# Patient Record
Sex: MALE | Race: WHITE | NOT HISPANIC OR LATINO | Employment: OTHER | URBAN - METROPOLITAN AREA
[De-identification: names, ages, dates, MRNs, and addresses within clinical notes are randomized per-mention and may not be internally consistent; named-entity substitution may affect disease eponyms.]

---

## 2017-01-30 ENCOUNTER — HOSPITAL ENCOUNTER (OUTPATIENT)
Dept: LAB | Facility: MEDICAL CENTER | Age: 82
End: 2017-01-30
Attending: NURSE PRACTITIONER
Payer: MEDICARE

## 2017-01-30 LAB — GFR SERPL CREATININE-BSD FRML MDRD: 39 ML/MIN/1.73 M 2

## 2017-01-30 PROCEDURE — 82728 ASSAY OF FERRITIN: CPT

## 2017-01-30 PROCEDURE — 82306 VITAMIN D 25 HYDROXY: CPT

## 2017-01-30 PROCEDURE — 84443 ASSAY THYROID STIM HORMONE: CPT

## 2017-01-30 PROCEDURE — 83540 ASSAY OF IRON: CPT

## 2017-01-30 PROCEDURE — 80053 COMPREHEN METABOLIC PANEL: CPT

## 2017-01-30 PROCEDURE — 85025 COMPLETE CBC W/AUTO DIFF WBC: CPT

## 2017-01-30 PROCEDURE — 36415 COLL VENOUS BLD VENIPUNCTURE: CPT

## 2017-01-30 PROCEDURE — 83550 IRON BINDING TEST: CPT

## 2017-01-30 PROCEDURE — 84153 ASSAY OF PSA TOTAL: CPT | Mod: GA

## 2017-01-31 LAB
25(OH)D3 SERPL-MCNC: 109 NG/ML (ref 30–100)
ALBUMIN SERPL BCP-MCNC: 3.6 G/DL (ref 3.2–4.9)
ALBUMIN/GLOB SERPL: 0.9 G/DL
ALP SERPL-CCNC: 84 U/L (ref 30–99)
ALT SERPL-CCNC: 12 U/L (ref 2–50)
ANION GAP SERPL CALC-SCNC: 6 MMOL/L (ref 0–11.9)
AST SERPL-CCNC: 14 U/L (ref 12–45)
BASOPHILS # BLD AUTO: 1.2 % (ref 0–1.8)
BASOPHILS # BLD: 0.1 K/UL (ref 0–0.12)
BILIRUB SERPL-MCNC: 1 MG/DL (ref 0.1–1.5)
BUN SERPL-MCNC: 35 MG/DL (ref 8–22)
CALCIUM SERPL-MCNC: 8.9 MG/DL (ref 8.4–10.2)
CHLORIDE SERPL-SCNC: 108 MMOL/L (ref 96–112)
CO2 SERPL-SCNC: 26 MMOL/L (ref 20–33)
CREAT SERPL-MCNC: 1.65 MG/DL (ref 0.5–1.4)
EOSINOPHIL # BLD AUTO: 0.31 K/UL (ref 0–0.51)
EOSINOPHIL NFR BLD: 3.9 % (ref 0–6.9)
ERYTHROCYTE [DISTWIDTH] IN BLOOD BY AUTOMATED COUNT: 45.5 FL (ref 35.9–50)
FERRITIN SERPL-MCNC: 53.5 NG/ML (ref 22–322)
GLOBULIN SER CALC-MCNC: 3.9 G/DL (ref 1.9–3.5)
GLUCOSE SERPL-MCNC: 101 MG/DL (ref 65–99)
HCT VFR BLD AUTO: 39.5 % (ref 42–52)
HGB BLD-MCNC: 13.7 G/DL (ref 14–18)
IMM GRANULOCYTES # BLD AUTO: 0.03 K/UL (ref 0–0.11)
IMM GRANULOCYTES NFR BLD AUTO: 0.4 % (ref 0–0.9)
IRON SATN MFR SERPL: 22 % (ref 15–55)
IRON SERPL-MCNC: 76 UG/DL (ref 50–180)
LYMPHOCYTES # BLD AUTO: 1.21 K/UL (ref 1–4.8)
LYMPHOCYTES NFR BLD: 15.1 % (ref 22–41)
MCH RBC QN AUTO: 32.6 PG (ref 27–33)
MCHC RBC AUTO-ENTMCNC: 34.7 G/DL (ref 33.7–35.3)
MCV RBC AUTO: 94 FL (ref 81.4–97.8)
MONOCYTES # BLD AUTO: 0.5 K/UL (ref 0–0.85)
MONOCYTES NFR BLD AUTO: 6.2 % (ref 0–13.4)
NEUTROPHILS # BLD AUTO: 5.86 K/UL (ref 1.82–7.42)
NEUTROPHILS NFR BLD: 73.2 % (ref 44–72)
NRBC # BLD AUTO: 0 K/UL
NRBC BLD AUTO-RTO: 0 /100 WBC
PLATELET # BLD AUTO: 164 K/UL (ref 164–446)
PMV BLD AUTO: 10.5 FL (ref 9–12.9)
POTASSIUM SERPL-SCNC: 4.2 MMOL/L (ref 3.6–5.5)
PROT SERPL-MCNC: 7.5 G/DL (ref 6–8.2)
PSA SERPL DL<=0.01 NG/ML-MCNC: 4.27 NG/ML (ref 0–4)
RBC # BLD AUTO: 4.2 M/UL (ref 4.7–6.1)
SODIUM SERPL-SCNC: 140 MMOL/L (ref 135–145)
TIBC SERPL-MCNC: 351 UG/DL (ref 250–450)
WBC # BLD AUTO: 8 K/UL (ref 4.8–10.8)

## 2017-04-30 ENCOUNTER — HOSPITAL ENCOUNTER (OUTPATIENT)
Facility: MEDICAL CENTER | Age: 82
End: 2017-04-30
Attending: FAMILY MEDICINE
Payer: MEDICARE

## 2017-04-30 LAB
ANION GAP SERPL CALC-SCNC: 10 MMOL/L (ref 0–11.9)
BASOPHILS # BLD AUTO: 0.7 % (ref 0–1.8)
BASOPHILS # BLD: 0.05 K/UL (ref 0–0.12)
BUN SERPL-MCNC: 29 MG/DL (ref 8–22)
CALCIUM SERPL-MCNC: 8.9 MG/DL (ref 8.5–10.5)
CHLORIDE SERPL-SCNC: 106 MMOL/L (ref 96–112)
CO2 SERPL-SCNC: 22 MMOL/L (ref 20–33)
CREAT SERPL-MCNC: 1.67 MG/DL (ref 0.5–1.4)
EOSINOPHIL # BLD AUTO: 0.28 K/UL (ref 0–0.51)
EOSINOPHIL NFR BLD: 3.8 % (ref 0–6.9)
ERYTHROCYTE [DISTWIDTH] IN BLOOD BY AUTOMATED COUNT: 46.1 FL (ref 35.9–50)
GFR SERPL CREATININE-BSD FRML MDRD: 39 ML/MIN/1.73 M 2
GLUCOSE SERPL-MCNC: 125 MG/DL (ref 65–99)
HCT VFR BLD AUTO: 42.5 % (ref 42–52)
HGB BLD-MCNC: 14.5 G/DL (ref 14–18)
IMM GRANULOCYTES # BLD AUTO: 0.02 K/UL (ref 0–0.11)
IMM GRANULOCYTES NFR BLD AUTO: 0.3 % (ref 0–0.9)
LYMPHOCYTES # BLD AUTO: 1.35 K/UL (ref 1–4.8)
LYMPHOCYTES NFR BLD: 18.3 % (ref 22–41)
MCH RBC QN AUTO: 32.4 PG (ref 27–33)
MCHC RBC AUTO-ENTMCNC: 34.1 G/DL (ref 33.7–35.3)
MCV RBC AUTO: 95.1 FL (ref 81.4–97.8)
MONOCYTES # BLD AUTO: 0.44 K/UL (ref 0–0.85)
MONOCYTES NFR BLD AUTO: 6 % (ref 0–13.4)
NEUTROPHILS # BLD AUTO: 5.23 K/UL (ref 1.82–7.42)
NEUTROPHILS NFR BLD: 70.9 % (ref 44–72)
NRBC # BLD AUTO: 0 K/UL
NRBC BLD AUTO-RTO: 0 /100 WBC
PLATELET # BLD AUTO: 147 K/UL (ref 164–446)
PMV BLD AUTO: 10.6 FL (ref 9–12.9)
POTASSIUM SERPL-SCNC: 4.1 MMOL/L (ref 3.6–5.5)
RBC # BLD AUTO: 4.47 M/UL (ref 4.7–6.1)
SODIUM SERPL-SCNC: 138 MMOL/L (ref 135–145)
WBC # BLD AUTO: 7.4 K/UL (ref 4.8–10.8)

## 2017-04-30 PROCEDURE — 85025 COMPLETE CBC W/AUTO DIFF WBC: CPT

## 2017-04-30 PROCEDURE — 80048 BASIC METABOLIC PNL TOTAL CA: CPT

## 2017-04-30 PROCEDURE — 36415 COLL VENOUS BLD VENIPUNCTURE: CPT

## 2017-09-04 ENCOUNTER — APPOINTMENT (OUTPATIENT)
Dept: RADIOLOGY | Facility: MEDICAL CENTER | Age: 82
DRG: 291 | End: 2017-09-04
Attending: EMERGENCY MEDICINE
Payer: MEDICARE

## 2017-09-04 ENCOUNTER — RESOLUTE PROFESSIONAL BILLING HOSPITAL PROF FEE (OUTPATIENT)
Dept: HOSPITALIST | Facility: MEDICAL CENTER | Age: 82
End: 2017-09-04
Payer: MEDICARE

## 2017-09-04 ENCOUNTER — HOSPITAL ENCOUNTER (INPATIENT)
Facility: MEDICAL CENTER | Age: 82
LOS: 6 days | DRG: 291 | End: 2017-09-10
Attending: EMERGENCY MEDICINE | Admitting: HOSPITALIST
Payer: MEDICARE

## 2017-09-04 DIAGNOSIS — I50.21 ACUTE SYSTOLIC CONGESTIVE HEART FAILURE (HCC): ICD-10-CM

## 2017-09-04 DIAGNOSIS — R06.03 RESPIRATORY DISTRESS: ICD-10-CM

## 2017-09-04 DIAGNOSIS — R53.1 WEAKNESS: ICD-10-CM

## 2017-09-04 DIAGNOSIS — A41.9 SEPSIS, DUE TO UNSPECIFIED ORGANISM: ICD-10-CM

## 2017-09-04 PROBLEM — I50.31 ACUTE DIASTOLIC CHF (CONGESTIVE HEART FAILURE) (HCC): Status: ACTIVE | Noted: 2017-09-04

## 2017-09-04 LAB
ALBUMIN SERPL BCP-MCNC: 3.6 G/DL (ref 3.2–4.9)
ALBUMIN/GLOB SERPL: 0.9 G/DL
ALP SERPL-CCNC: 113 U/L (ref 30–99)
ALT SERPL-CCNC: 14 U/L (ref 2–50)
ANION GAP SERPL CALC-SCNC: 9 MMOL/L (ref 0–11.9)
APPEARANCE UR: CLEAR
AST SERPL-CCNC: 21 U/L (ref 12–45)
BACTERIA #/AREA URNS HPF: ABNORMAL /HPF
BASE EXCESS BLDA CALC-SCNC: -3 MMOL/L (ref -4–3)
BASOPHILS # BLD AUTO: 0.6 % (ref 0–1.8)
BASOPHILS # BLD: 0.06 K/UL (ref 0–0.12)
BILIRUB SERPL-MCNC: 1.3 MG/DL (ref 0.1–1.5)
BILIRUB UR QL STRIP.AUTO: NEGATIVE
BNP SERPL-MCNC: 297 PG/ML (ref 0–100)
BODY TEMPERATURE: ABNORMAL CENTIGRADE
BUN SERPL-MCNC: 34 MG/DL (ref 8–22)
CALCIUM SERPL-MCNC: 8.4 MG/DL (ref 8.4–10.2)
CHLORIDE SERPL-SCNC: 105 MMOL/L (ref 96–112)
CO2 SERPL-SCNC: 21 MMOL/L (ref 20–33)
COLOR UR: YELLOW
CREAT SERPL-MCNC: 1.86 MG/DL (ref 0.5–1.4)
EOSINOPHIL # BLD AUTO: 0.26 K/UL (ref 0–0.51)
EOSINOPHIL NFR BLD: 2.4 % (ref 0–6.9)
ERYTHROCYTE [DISTWIDTH] IN BLOOD BY AUTOMATED COUNT: 46.6 FL (ref 35.9–50)
GFR SERPL CREATININE-BSD FRML MDRD: 34 ML/MIN/1.73 M 2
GLOBULIN SER CALC-MCNC: 4 G/DL (ref 1.9–3.5)
GLUCOSE SERPL-MCNC: 119 MG/DL (ref 65–99)
GLUCOSE UR STRIP.AUTO-MCNC: NEGATIVE MG/DL
HCO3 BLDA-SCNC: 21 MMOL/L (ref 17–25)
HCT VFR BLD AUTO: 38.2 % (ref 42–52)
HGB BLD-MCNC: 12.9 G/DL (ref 14–18)
IMM GRANULOCYTES # BLD AUTO: 0.06 K/UL (ref 0–0.11)
IMM GRANULOCYTES NFR BLD AUTO: 0.6 % (ref 0–0.9)
INR PPP: 1.4 (ref 0.87–1.13)
KETONES UR STRIP.AUTO-MCNC: NEGATIVE MG/DL
LACTATE BLD-SCNC: 1.81 MMOL/L (ref 0.5–2)
LACTATE BLD-SCNC: 1.89 MMOL/L (ref 0.5–2)
LACTATE BLD-SCNC: 3.37 MMOL/L (ref 0.5–2)
LEUKOCYTE ESTERASE UR QL STRIP.AUTO: NEGATIVE
LYMPHOCYTES # BLD AUTO: 1.06 K/UL (ref 1–4.8)
LYMPHOCYTES NFR BLD: 9.9 % (ref 22–41)
MCH RBC QN AUTO: 32.5 PG (ref 27–33)
MCHC RBC AUTO-ENTMCNC: 33.8 G/DL (ref 33.7–35.3)
MCV RBC AUTO: 96.2 FL (ref 81.4–97.8)
MICRO URNS: ABNORMAL
MONOCYTES # BLD AUTO: 1.09 K/UL (ref 0–0.85)
MONOCYTES NFR BLD AUTO: 10.2 % (ref 0–13.4)
MUCOUS THREADS #/AREA URNS HPF: ABNORMAL /HPF
NEUTROPHILS # BLD AUTO: 8.14 K/UL (ref 1.82–7.42)
NEUTROPHILS NFR BLD: 76.3 % (ref 44–72)
NITRITE UR QL STRIP.AUTO: NEGATIVE
NRBC # BLD AUTO: 0 K/UL
NRBC BLD AUTO-RTO: 0 /100 WBC
PCO2 BLDA: 34.2 MMHG (ref 26–37)
PH BLDA: 7.41 [PH] (ref 7.4–7.5)
PH UR STRIP.AUTO: 5.5 [PH]
PLATELET # BLD AUTO: 152 K/UL (ref 164–446)
PMV BLD AUTO: 10.1 FL (ref 9–12.9)
PO2 BLDA: 69.7 MMHG (ref 64–87)
POTASSIUM SERPL-SCNC: 4 MMOL/L (ref 3.6–5.5)
PROT SERPL-MCNC: 7.6 G/DL (ref 6–8.2)
PROT UR QL STRIP: 30 MG/DL
PROTHROMBIN TIME: 16.9 SEC (ref 12–14.6)
RBC # BLD AUTO: 3.97 M/UL (ref 4.7–6.1)
RBC # URNS HPF: ABNORMAL /HPF
RBC UR QL AUTO: NEGATIVE
SAO2 % BLDA: 92.3 % (ref 93–99)
SODIUM SERPL-SCNC: 135 MMOL/L (ref 135–145)
SP GR UR STRIP.AUTO: 1.02
SPECIMEN DRAWN FROM PATIENT: ABNORMAL
SPECIMEN DRAWN FROM PATIENT: NORMAL
SPECIMEN DRAWN FROM PATIENT: NORMAL
WBC # BLD AUTO: 10.7 K/UL (ref 4.8–10.8)
WBC #/AREA URNS HPF: ABNORMAL /HPF

## 2017-09-04 PROCEDURE — 82803 BLOOD GASES ANY COMBINATION: CPT

## 2017-09-04 PROCEDURE — 700101 HCHG RX REV CODE 250: Performed by: EMERGENCY MEDICINE

## 2017-09-04 PROCEDURE — 87040 BLOOD CULTURE FOR BACTERIA: CPT | Mod: 91

## 2017-09-04 PROCEDURE — A9270 NON-COVERED ITEM OR SERVICE: HCPCS | Performed by: HOSPITALIST

## 2017-09-04 PROCEDURE — 83880 ASSAY OF NATRIURETIC PEPTIDE: CPT

## 2017-09-04 PROCEDURE — 94640 AIRWAY INHALATION TREATMENT: CPT

## 2017-09-04 PROCEDURE — 85025 COMPLETE CBC W/AUTO DIFF WBC: CPT

## 2017-09-04 PROCEDURE — 700105 HCHG RX REV CODE 258: Performed by: EMERGENCY MEDICINE

## 2017-09-04 PROCEDURE — 80053 COMPREHEN METABOLIC PANEL: CPT

## 2017-09-04 PROCEDURE — 87086 URINE CULTURE/COLONY COUNT: CPT

## 2017-09-04 PROCEDURE — 96375 TX/PRO/DX INJ NEW DRUG ADDON: CPT

## 2017-09-04 PROCEDURE — 83605 ASSAY OF LACTIC ACID: CPT | Mod: 91

## 2017-09-04 PROCEDURE — 36415 COLL VENOUS BLD VENIPUNCTURE: CPT

## 2017-09-04 PROCEDURE — 99285 EMERGENCY DEPT VISIT HI MDM: CPT

## 2017-09-04 PROCEDURE — 96365 THER/PROPH/DIAG IV INF INIT: CPT

## 2017-09-04 PROCEDURE — 700111 HCHG RX REV CODE 636 W/ 250 OVERRIDE (IP): Performed by: EMERGENCY MEDICINE

## 2017-09-04 PROCEDURE — 700102 HCHG RX REV CODE 250 W/ 637 OVERRIDE(OP): Performed by: HOSPITALIST

## 2017-09-04 PROCEDURE — 99223 1ST HOSP IP/OBS HIGH 75: CPT | Mod: AI | Performed by: HOSPITALIST

## 2017-09-04 PROCEDURE — 36600 WITHDRAWAL OF ARTERIAL BLOOD: CPT

## 2017-09-04 PROCEDURE — 85610 PROTHROMBIN TIME: CPT

## 2017-09-04 PROCEDURE — 71010 DX-CHEST-PORTABLE (1 VIEW): CPT

## 2017-09-04 PROCEDURE — 770020 HCHG ROOM/CARE - TELE (206)

## 2017-09-04 PROCEDURE — 81001 URINALYSIS AUTO W/SCOPE: CPT

## 2017-09-04 RX ORDER — DABIGATRAN ETEXILATE 75 MG/1
75 CAPSULE ORAL 2 TIMES DAILY
COMMUNITY
End: 2018-02-09

## 2017-09-04 RX ORDER — MEMANTINE HYDROCHLORIDE 10 MG/1
5 TABLET ORAL DAILY
Status: DISCONTINUED | OUTPATIENT
Start: 2017-09-05 | End: 2017-09-10 | Stop reason: HOSPADM

## 2017-09-04 RX ORDER — AZITHROMYCIN 250 MG/1
500 TABLET, FILM COATED ORAL ONCE
Status: COMPLETED | OUTPATIENT
Start: 2017-09-04 | End: 2017-09-04

## 2017-09-04 RX ORDER — BISACODYL 10 MG
10 SUPPOSITORY, RECTAL RECTAL
Status: DISCONTINUED | OUTPATIENT
Start: 2017-09-04 | End: 2017-09-10 | Stop reason: HOSPADM

## 2017-09-04 RX ORDER — ESCITALOPRAM OXALATE 10 MG/1
10 TABLET ORAL DAILY
Status: DISCONTINUED | OUTPATIENT
Start: 2017-09-05 | End: 2017-09-10 | Stop reason: HOSPADM

## 2017-09-04 RX ORDER — SODIUM CHLORIDE 9 MG/ML
1000 INJECTION, SOLUTION INTRAVENOUS CONTINUOUS
Status: DISCONTINUED | OUTPATIENT
Start: 2017-09-04 | End: 2017-09-04

## 2017-09-04 RX ORDER — AMOXICILLIN 250 MG
2 CAPSULE ORAL 2 TIMES DAILY
Status: DISCONTINUED | OUTPATIENT
Start: 2017-09-04 | End: 2017-09-10 | Stop reason: HOSPADM

## 2017-09-04 RX ORDER — MEMANTINE HYDROCHLORIDE 5 MG/1
5 TABLET ORAL EVERY EVENING
COMMUNITY
End: 2018-02-27 | Stop reason: SDUPTHER

## 2017-09-04 RX ORDER — DABIGATRAN ETEXILATE 75 MG/1
75 CAPSULE ORAL 2 TIMES DAILY
Status: DISCONTINUED | OUTPATIENT
Start: 2017-09-04 | End: 2017-09-10 | Stop reason: HOSPADM

## 2017-09-04 RX ORDER — POLYETHYLENE GLYCOL 3350 17 G/17G
1 POWDER, FOR SOLUTION ORAL
Status: DISCONTINUED | OUTPATIENT
Start: 2017-09-04 | End: 2017-09-10 | Stop reason: HOSPADM

## 2017-09-04 RX ORDER — ONDANSETRON 2 MG/ML
4 INJECTION INTRAMUSCULAR; INTRAVENOUS EVERY 4 HOURS PRN
Status: DISCONTINUED | OUTPATIENT
Start: 2017-09-04 | End: 2017-09-10 | Stop reason: HOSPADM

## 2017-09-04 RX ORDER — ONDANSETRON 4 MG/1
4 TABLET, ORALLY DISINTEGRATING ORAL EVERY 4 HOURS PRN
Status: DISCONTINUED | OUTPATIENT
Start: 2017-09-04 | End: 2017-09-10 | Stop reason: HOSPADM

## 2017-09-04 RX ORDER — FUROSEMIDE 10 MG/ML
20 INJECTION INTRAMUSCULAR; INTRAVENOUS
Status: DISCONTINUED | OUTPATIENT
Start: 2017-09-04 | End: 2017-09-04

## 2017-09-04 RX ORDER — FUROSEMIDE 10 MG/ML
20 INJECTION INTRAMUSCULAR; INTRAVENOUS
Status: DISCONTINUED | OUTPATIENT
Start: 2017-09-05 | End: 2017-09-05

## 2017-09-04 RX ORDER — CEFTRIAXONE 2 G/1
2 INJECTION, POWDER, FOR SOLUTION INTRAMUSCULAR; INTRAVENOUS DAILY
Status: DISCONTINUED | OUTPATIENT
Start: 2017-09-05 | End: 2017-09-05

## 2017-09-04 RX ORDER — FUROSEMIDE 10 MG/ML
40 INJECTION INTRAMUSCULAR; INTRAVENOUS ONCE
Status: COMPLETED | OUTPATIENT
Start: 2017-09-04 | End: 2017-09-04

## 2017-09-04 RX ORDER — LOSARTAN POTASSIUM 25 MG/1
25 TABLET ORAL DAILY
Status: ON HOLD | COMMUNITY
End: 2017-09-10

## 2017-09-04 RX ORDER — AZITHROMYCIN 250 MG/1
250 TABLET, FILM COATED ORAL DAILY
Status: DISCONTINUED | OUTPATIENT
Start: 2017-09-05 | End: 2017-09-05

## 2017-09-04 RX ORDER — TAMSULOSIN HYDROCHLORIDE 0.4 MG/1
0.4 CAPSULE ORAL
Status: DISCONTINUED | OUTPATIENT
Start: 2017-09-04 | End: 2017-09-10 | Stop reason: HOSPADM

## 2017-09-04 RX ORDER — CEFTRIAXONE 1 G/1
1 INJECTION, POWDER, FOR SOLUTION INTRAMUSCULAR; INTRAVENOUS ONCE
Status: COMPLETED | OUTPATIENT
Start: 2017-09-04 | End: 2017-09-04

## 2017-09-04 RX ORDER — IPRATROPIUM BROMIDE AND ALBUTEROL SULFATE 2.5; .5 MG/3ML; MG/3ML
3 SOLUTION RESPIRATORY (INHALATION)
Status: DISCONTINUED | OUTPATIENT
Start: 2017-09-04 | End: 2017-09-05

## 2017-09-04 RX ORDER — OMEPRAZOLE 20 MG/1
20 CAPSULE, DELAYED RELEASE ORAL DAILY
Status: DISCONTINUED | OUTPATIENT
Start: 2017-09-05 | End: 2017-09-10 | Stop reason: HOSPADM

## 2017-09-04 RX ADMIN — TAMSULOSIN HYDROCHLORIDE 0.4 MG: 0.4 CAPSULE ORAL at 18:42

## 2017-09-04 RX ADMIN — CEFTRIAXONE 1 G: 1 INJECTION, POWDER, FOR SOLUTION INTRAMUSCULAR; INTRAVENOUS at 15:05

## 2017-09-04 RX ADMIN — DABIGATRAN ETEXILATE MESYLATE 75 MG: 75 CAPSULE ORAL at 20:15

## 2017-09-04 RX ADMIN — SODIUM CHLORIDE 1000 ML: 9 INJECTION, SOLUTION INTRAVENOUS at 15:05

## 2017-09-04 RX ADMIN — ALBUTEROL SULFATE 2.5 MG: 2.5 SOLUTION RESPIRATORY (INHALATION) at 14:49

## 2017-09-04 RX ADMIN — FUROSEMIDE 40 MG: 10 INJECTION, SOLUTION INTRAMUSCULAR; INTRAVENOUS at 16:31

## 2017-09-04 RX ADMIN — IPRATROPIUM BROMIDE 0.5 MG: 0.5 SOLUTION RESPIRATORY (INHALATION) at 14:49

## 2017-09-04 RX ADMIN — AZITHROMYCIN 500 MG: 250 TABLET, FILM COATED ORAL at 20:15

## 2017-09-04 ASSESSMENT — PAIN SCALES - GENERAL
PAINLEVEL_OUTOF10: 0

## 2017-09-04 ASSESSMENT — CHA2DS2 SCORE
AGE 65 TO 74: NO
DIABETES: NO
VASCULAR DISEASE: NO
HYPERTENSION: YES
CHA2DS2 VASC SCORE: 4
AGE 75 OR GREATER: YES
CHF OR LEFT VENTRICULAR DYSFUNCTION: YES
PRIOR STROKE OR TIA OR THROMBOEMBOLISM: NO
SEX: MALE

## 2017-09-04 ASSESSMENT — PATIENT HEALTH QUESTIONNAIRE - PHQ9
2. FEELING DOWN, DEPRESSED, IRRITABLE, OR HOPELESS: NOT AT ALL
SUM OF ALL RESPONSES TO PHQ9 QUESTIONS 1 AND 2: 0
1. LITTLE INTEREST OR PLEASURE IN DOING THINGS: NOT AT ALL
SUM OF ALL RESPONSES TO PHQ QUESTIONS 1-9: 0

## 2017-09-04 ASSESSMENT — LIFESTYLE VARIABLES
EVER_SMOKED: NEVER
ALCOHOL_USE: NO
EVER_SMOKED: YES

## 2017-09-04 NOTE — FLOWSHEET NOTE
SVN given in ER.  Patient states he does not feel much difference.  RR and breath sounds with no change post treatment.     09/04/17 0461   Interdisciplinary Plan of Care-Goals (Indications)   Obstructive Ventilatory Defect or Pulmonary Disease without Obvious Obstruction Physical Exam / Hyperinflation / Wheezing (bronchospasm)   SVN Group   #SVN Performed Yes   Given By: Mask   Chest Exam   Work Of Breathing / Effort Tachypnea;Increased Work of Breathing   Respiration (!) 36   Pulse 61   Heart Rate (Monitored) 60   Breath Sounds   RUL Breath Sounds Coarse Crackles;Expiratory Wheezes   RML Breath Sounds Coarse Crackles;Expiratory Wheezes   RLL Breath Sounds Coarse Crackles;Expiratory Wheezes   SARA Breath Sounds Coarse Crackles;Expiratory Wheezes   LLL Breath Sounds Coarse Crackles;Expiratory Wheezes   Secretions   Cough Moist;Non Productive   How Sputum Obtained Cough on Request   Oximetry   Continuous Oximetry Yes   Oxygen   Home O2 Use Prior To Admission? No   Pulse Oximetry 92 %   O2 (LPM) 2   O2 Daily Delivery Respiratory  Silicone Nasal Cannula

## 2017-09-04 NOTE — ED NOTES
POC discussed with pt and pt's son using AIDET. Pt and pt's son agree with POC. Pt with call light in reach and gurney in low position for pt safety. Respiratory tech at bedside for breathing tx.

## 2017-09-04 NOTE — ED NOTES
Pt with dressing to left elbow. Dressing placed by Dr Colin. Pt had GLF today while transferring from vehicle to w/c.

## 2017-09-04 NOTE — ED NOTES
"Chief Complaint   Patient presents with   • Shortness of Breath     for 3 days, increasing SOB and difficulty breathing     /80   Pulse 71   Temp 37.2 °C (98.9 °F)   Resp (!) 40   Ht 1.702 m (5' 7\")   Wt 68 kg (149 lb 14.6 oz)   SpO2 88%   BMI 23.48 kg/m²     "

## 2017-09-05 ENCOUNTER — APPOINTMENT (OUTPATIENT)
Dept: RADIOLOGY | Facility: MEDICAL CENTER | Age: 82
DRG: 291 | End: 2017-09-05
Attending: HOSPITALIST
Payer: MEDICARE

## 2017-09-05 ENCOUNTER — APPOINTMENT (OUTPATIENT)
Dept: RADIOLOGY | Facility: MEDICAL CENTER | Age: 82
DRG: 291 | End: 2017-09-05
Attending: INTERNAL MEDICINE
Payer: MEDICARE

## 2017-09-05 PROBLEM — J96.01 ACUTE RESPIRATORY FAILURE WITH HYPOXIA (HCC): Status: ACTIVE | Noted: 2017-09-05

## 2017-09-05 PROBLEM — N18.30 CHRONIC KIDNEY DISEASE, STAGE III (MODERATE): Status: ACTIVE | Noted: 2017-09-05

## 2017-09-05 PROBLEM — E87.20 LACTIC ACIDOSIS: Status: ACTIVE | Noted: 2017-09-05

## 2017-09-05 LAB
ANION GAP SERPL CALC-SCNC: 9 MMOL/L (ref 0–11.9)
BASOPHILS # BLD AUTO: 0.4 % (ref 0–1.8)
BASOPHILS # BLD: 0.04 K/UL (ref 0–0.12)
BUN SERPL-MCNC: 35 MG/DL (ref 8–22)
CALCIUM SERPL-MCNC: 8.2 MG/DL (ref 8.4–10.2)
CHLORIDE SERPL-SCNC: 103 MMOL/L (ref 96–112)
CO2 SERPL-SCNC: 23 MMOL/L (ref 20–33)
CREAT SERPL-MCNC: 1.77 MG/DL (ref 0.5–1.4)
EOSINOPHIL # BLD AUTO: 0.18 K/UL (ref 0–0.51)
EOSINOPHIL NFR BLD: 1.8 % (ref 0–6.9)
ERYTHROCYTE [DISTWIDTH] IN BLOOD BY AUTOMATED COUNT: 45.2 FL (ref 35.9–50)
GFR SERPL CREATININE-BSD FRML MDRD: 36 ML/MIN/1.73 M 2
GLUCOSE SERPL-MCNC: 123 MG/DL (ref 65–99)
HCT VFR BLD AUTO: 35.5 % (ref 42–52)
HGB BLD-MCNC: 12.2 G/DL (ref 14–18)
IMM GRANULOCYTES # BLD AUTO: 0.02 K/UL (ref 0–0.11)
IMM GRANULOCYTES NFR BLD AUTO: 0.2 % (ref 0–0.9)
LV EJECT FRACT  99904: 70
LYMPHOCYTES # BLD AUTO: 0.73 K/UL (ref 1–4.8)
LYMPHOCYTES NFR BLD: 7.4 % (ref 22–41)
MCH RBC QN AUTO: 32.2 PG (ref 27–33)
MCHC RBC AUTO-ENTMCNC: 34.4 G/DL (ref 33.7–35.3)
MCV RBC AUTO: 93.7 FL (ref 81.4–97.8)
MONOCYTES # BLD AUTO: 1.06 K/UL (ref 0–0.85)
MONOCYTES NFR BLD AUTO: 10.8 % (ref 0–13.4)
NEUTROPHILS # BLD AUTO: 7.77 K/UL (ref 1.82–7.42)
NEUTROPHILS NFR BLD: 79.4 % (ref 44–72)
NRBC # BLD AUTO: 0 K/UL
NRBC BLD AUTO-RTO: 0 /100 WBC
PLATELET # BLD AUTO: 138 K/UL (ref 164–446)
PMV BLD AUTO: 10.7 FL (ref 9–12.9)
POTASSIUM SERPL-SCNC: 3.9 MMOL/L (ref 3.6–5.5)
RBC # BLD AUTO: 3.79 M/UL (ref 4.7–6.1)
SODIUM SERPL-SCNC: 135 MMOL/L (ref 135–145)
WBC # BLD AUTO: 9.8 K/UL (ref 4.8–10.8)

## 2017-09-05 PROCEDURE — A9270 NON-COVERED ITEM OR SERVICE: HCPCS | Performed by: HOSPITALIST

## 2017-09-05 PROCEDURE — 700102 HCHG RX REV CODE 250 W/ 637 OVERRIDE(OP): Performed by: HOSPITALIST

## 2017-09-05 PROCEDURE — 80048 BASIC METABOLIC PNL TOTAL CA: CPT

## 2017-09-05 PROCEDURE — 93306 TTE W/DOPPLER COMPLETE: CPT

## 2017-09-05 PROCEDURE — 700111 HCHG RX REV CODE 636 W/ 250 OVERRIDE (IP): Performed by: HOSPITALIST

## 2017-09-05 PROCEDURE — 71010 DX-CHEST-PORTABLE (1 VIEW): CPT

## 2017-09-05 PROCEDURE — 93306 TTE W/DOPPLER COMPLETE: CPT | Mod: 26 | Performed by: INTERNAL MEDICINE

## 2017-09-05 PROCEDURE — 770020 HCHG ROOM/CARE - TELE (206)

## 2017-09-05 PROCEDURE — 700111 HCHG RX REV CODE 636 W/ 250 OVERRIDE (IP): Performed by: INTERNAL MEDICINE

## 2017-09-05 PROCEDURE — 85025 COMPLETE CBC W/AUTO DIFF WBC: CPT

## 2017-09-05 PROCEDURE — 700105 HCHG RX REV CODE 258: Performed by: HOSPITALIST

## 2017-09-05 PROCEDURE — 99233 SBSQ HOSP IP/OBS HIGH 50: CPT | Performed by: INTERNAL MEDICINE

## 2017-09-05 PROCEDURE — 71250 CT THORAX DX C-: CPT

## 2017-09-05 RX ORDER — FUROSEMIDE 10 MG/ML
20 INJECTION INTRAMUSCULAR; INTRAVENOUS
Status: DISCONTINUED | OUTPATIENT
Start: 2017-09-05 | End: 2017-09-07

## 2017-09-05 RX ORDER — PREDNISONE 50 MG/1
50 TABLET ORAL DAILY
Status: DISCONTINUED | OUTPATIENT
Start: 2017-09-05 | End: 2017-09-07

## 2017-09-05 RX ORDER — ALBUTEROL SULFATE 90 UG/1
2 AEROSOL, METERED RESPIRATORY (INHALATION)
Status: DISCONTINUED | OUTPATIENT
Start: 2017-09-05 | End: 2017-09-10 | Stop reason: HOSPADM

## 2017-09-05 RX ORDER — IPRATROPIUM BROMIDE AND ALBUTEROL SULFATE 2.5; .5 MG/3ML; MG/3ML
3 SOLUTION RESPIRATORY (INHALATION)
Status: DISCONTINUED | OUTPATIENT
Start: 2017-09-05 | End: 2017-09-07

## 2017-09-05 RX ADMIN — MEMANTINE HYDROCHLORIDE 5 MG: 10 TABLET ORAL at 08:55

## 2017-09-05 RX ADMIN — OMEPRAZOLE 20 MG: 20 CAPSULE, DELAYED RELEASE ORAL at 08:55

## 2017-09-05 RX ADMIN — TAMSULOSIN HYDROCHLORIDE 0.4 MG: 0.4 CAPSULE ORAL at 08:54

## 2017-09-05 RX ADMIN — FUROSEMIDE 20 MG: 10 INJECTION, SOLUTION INTRAMUSCULAR; INTRAVENOUS at 16:00

## 2017-09-05 RX ADMIN — DABIGATRAN ETEXILATE MESYLATE 75 MG: 75 CAPSULE ORAL at 20:26

## 2017-09-05 RX ADMIN — AZITHROMYCIN 250 MG: 250 TABLET, FILM COATED ORAL at 08:55

## 2017-09-05 RX ADMIN — DOCUSATE SODIUM AND SENNOSIDES 2 TABLET: 8.6; 5 TABLET, FILM COATED ORAL at 08:55

## 2017-09-05 RX ADMIN — CEFTRIAXONE 2 G: 2 INJECTION, POWDER, FOR SOLUTION INTRAMUSCULAR; INTRAVENOUS at 08:54

## 2017-09-05 RX ADMIN — ESCITALOPRAM OXALATE 10 MG: 10 TABLET ORAL at 08:54

## 2017-09-05 RX ADMIN — PREDNISONE 50 MG: 50 TABLET ORAL at 11:19

## 2017-09-05 RX ADMIN — FUROSEMIDE 20 MG: 10 INJECTION, SOLUTION INTRAMUSCULAR; INTRAVENOUS at 08:55

## 2017-09-05 RX ADMIN — DOCUSATE SODIUM AND SENNOSIDES 2 TABLET: 8.6; 5 TABLET, FILM COATED ORAL at 20:26

## 2017-09-05 RX ADMIN — DABIGATRAN ETEXILATE MESYLATE 75 MG: 75 CAPSULE ORAL at 09:00

## 2017-09-05 ASSESSMENT — PAIN SCALES - GENERAL
PAINLEVEL_OUTOF10: 0

## 2017-09-05 ASSESSMENT — ENCOUNTER SYMPTOMS: VOMITING: 1

## 2017-09-05 NOTE — PROGRESS NOTES
BS report received. Patient in bed, alert, family at BS. No c/o pain or nauseaat this time. Get SOB with minimal exertion. POC discussed with patient and family, verbalized understanding. Bed low and locked, bed alarm on. Lines and monitor alarm verified. Call light and belonging within reach. Fall precaution in effect. Will continue to monitor closely.

## 2017-09-05 NOTE — PROGRESS NOTES
[facility] Hospitalist Progress Note    Date of Service: 2017    Chief Complaint  89 y.o. male with PMH of dementia, PAF, CKD III admitted 2017 with SOB    Interval Problem Update  Pleasantly demented    Consultants/Specialty  none    Disposition  Remain on the floor        Review of Systems   Unable to perform ROS: Dementia   Gastrointestinal: Positive for vomiting.      Physical Exam  Laboratory/Imaging   Hemodynamics  Temp (24hrs), Av °C (98.6 °F), Min:36.6 °C (97.9 °F), Max:37.3 °C (99.1 °F)   Temperature: 36.8 °C (98.2 °F)  Pulse  Av.8  Min: 60  Max: 77 Heart Rate (Monitored): (!) 59  Blood Pressure : 142/80, NIBP: 109/52      Respiratory      Respiration: (!) 30, Pulse Oximetry: 91 %, O2 Daily Delivery Respiratory : Silicone Nasal Cannula     Given By:: Mask, Work Of Breathing / Effort: Increased Work of Breathing;Tachypnea  RUL Breath Sounds: Rhonchi, RML Breath Sounds: Rhonchi, RLL Breath Sounds: Coarse Crackles, SARA Breath Sounds: Rhonchi, LLL Breath Sounds: Coarse Crackles    Fluids    Intake/Output Summary (Last 24 hours) at 17 0741  Last data filed at 17 2200   Gross per 24 hour   Intake              200 ml   Output             1230 ml   Net            -1030 ml       Nutrition  Orders Placed This Encounter   Procedures   • Diet Order     Standing Status:   Standing     Number of Occurrences:   1     Order Specific Question:   Diet:     Answer:   Cardiac [6]     Physical Exam   Constitutional: No distress.   HENT:   Head: Normocephalic and atraumatic.   Mouth/Throat: Oropharynx is clear and moist.   Eyes: Conjunctivae and EOM are normal. Pupils are equal, round, and reactive to light.   Neck: Normal range of motion. Neck supple. No tracheal deviation present. No thyromegaly present.   Cardiovascular: Normal rate and regular rhythm.    No murmur heard.  Pulmonary/Chest: He is in respiratory distress. He has wheezes.   tachypnea   Abdominal: Soft. Bowel sounds are normal. He  exhibits no distension. There is no tenderness.   Musculoskeletal: He exhibits no edema or tenderness.   Neurological: He is alert. No cranial nerve deficit.   Skin: Skin is warm and dry. He is not diaphoretic. No erythema.   Psychiatric:   demented       Recent Labs      09/04/17   1140  09/05/17   0403   WBC  10.7  9.8   RBC  3.97*  3.79*   HEMOGLOBIN  12.9*  12.2*   HEMATOCRIT  38.2*  35.5*   MCV  96.2  93.7   MCH  32.5  32.2   MCHC  33.8  34.4   RDW  46.6  45.2   PLATELETCT  152*  138*   MPV  10.1  10.7     Recent Labs      09/04/17   1140  09/05/17   0403   SODIUM  135  135   POTASSIUM  4.0  3.9   CHLORIDE  105  103   CO2  21  23   GLUCOSE  119*  123*   BUN  34*  35*   CREATININE  1.86*  1.77*   CALCIUM  8.4  8.2*     Recent Labs      09/04/17   1140   INR  1.40*     Recent Labs      09/04/17   1140   BNPBTYPENAT  297*              Assessment/Plan     Acute respiratory failure with hypoxia (CMS-[facility])   Assessment & Plan    Likely related to acute systolic heart failure vs COPD/interstitial lung disease?  History of pulmonary fibrosis from previous CT  BNP elevated  On IV Lasix 20 mg twice a day  Aggressive breathing treatment, steroid  HRCT: pending  Echocardiogram pending  We'll adjust medication as needed        Lactic acidosis   Assessment & Plan    Resolved with IV fluid        Chronic kidney disease, stage III (moderate)   Assessment & Plan    Stable  Avoid nephrotoxic medication  Follow CMP in the morning        Thrombocytopenia (CMS-[facility])- (present on admission)   Assessment & Plan    Follow CBC in a.m.        Dementia- (present on admission)   Assessment & Plan    Continue me mementine        Paroxysmal atrial fibrillation (CMS-[facility])- (present on admission)   Assessment & Plan    Rate control  On digoxin and pradaxa            Reviewed items::  Labs reviewed, Medications reviewed and Radiology images reviewed  DVT: pradaxa.

## 2017-09-05 NOTE — CARE PLAN
Problem: Safety  Goal: Will remain free from falls    Intervention: Implement fall precautions  Room/floor clear. Non skid socks on. Proper signs up. Bed alarm on, bed low and locked. Call light and belonging within reach. Hourly rounding in place to make sure needs are met.       Problem: Infection  Goal: Will remain free from infection    Intervention: Implement standard precautions and perform hand washing before and after patient contact  Hand washing every encounter. IV ports scrubbed with alcohol when hanging medicine. Patient watch for s/s of infection. Patient taught to report s/s of infection, verbalized understanding.       Problem: Respiratory:  Goal: Respiratory status will improve    Intervention: Educate and encourage coughing and deep breathing  Pt encouraged to perform coughing and deep breathing, verbalized understanding, needs reinforcement.

## 2017-09-05 NOTE — PROGRESS NOTES
Spoke to Dr. Colin re EKG order that was cancelled. Per MD no EKG at this time, done in ER per ER MD noted.

## 2017-09-05 NOTE — H&P
DATE OF ADMISSION:  09/04/2017.    PRIMARY CARE PROVIDER:  Thelma Cervantes MD.    REASON FOR EVALUATION:  Shortness of breath.    HISTORY OF PRESENT ILLNESS:  A 89-year-old male who comes in with complaints   of shortness of breath ongoing for the last 3 days.  There is no documentation   of any fever or chills.  Patient was brought into the emergency room for   further evaluation.  There are no complaints of any chest pain, palpitations   or diaphoresis.  In the emergency room, patient was afebrile, pulse is 71.  He   was tachypneic and hypoxic.  He was sating at 88% on room air.  He does not   have home oxygen.  His BNP was elevated at 297 and an x-ray showed evidence of   a lung edema.  His son was at the bedside and he states that for the last   several weeks, he has noted increasing leg edema in both legs.  Patient was   referred to me for further care and management.  He had incidental finding of   elevated lactic acid at 3.37.  I do not believe this patient is septic.    ALLERGIES:  BETA BLOCKER AND PENICILLINS.    OUTPATIENT MEDICATIONS:  1.  Norvasc 5 mg daily.  2.  Lexapro 10 mg daily.  3.  Prilosec 20 mg daily.  4.  Flomax 0.4 mg daily.  5.  Cozaar 25 mg daily.  6.  Namenda 5 mg daily.  7.  Pradaxa 75 mg b.i.d.  8.  Colace 100 mg b.i.d.  9.  Vitamin C 500 mg in the evening.    SOCIAL HISTORY:  He is in a memory care unit.  No history of alcohol or   illicit drugs.    FAMILY HISTORY:  Father had some stomach problems.    REVIEW OF SYSTEMS:  Positive for shortness of breath, but no fever, no chills,   nonproductive cough.  No nausea, no vomiting, no abdominal pain,   constipation, or diarrhea.  He does have some issues with urinary retention   sometimes.  He does have leg swelling.  No focal deficits.  He is hard of   hearing.  He injured himself coming into the emergency room and he has an open   slight laceration by his left elbow.  Other review of systems is otherwise   negative.    PAST  SURGICAL HISTORY:  Cholecystectomy, lumbar laminectomy, and shoulder   arthroscopy.    REVIEW OF SYSTEMS:  As above, otherwise negative.    PHYSICAL EXAMINATION:  VITAL SIGNS:  Blood pressure is 142/80, pulse is 60, temperature 36.2, and   respirations 32.  GENERAL:  This is an elderly male, hard of hearing, in some mild respiratory   distress.  HEENT:  Sclerae is anicteric.  Mild JVD noted.  Oral mucosa is moist.  NECK:  Supple.  HEART:  S1 and S2, regular rate, no murmurs appreciated.  LUNGS:  He has coarse bilateral rhonchi.  ABDOMEN:  Distended, soft, nontender, positive bowel sounds appreciated.  EXTREMITIES:  He has +2 pitting edema in both lower extremities.  NEUROLOGIC:  He is alert, awake, hard of hearing, nonfocal.    LABORATORY DATA:  White cell count of 10, hemoglobin 12, hematocrit 38, and   platelet is 152.  Sodium is 135, potassium 4, BUN 34, and creatinine of 1.86.    Lactic acid 3.37, repeat is 1.89.  BNP is 297.    X-ray of chest showing perihilar congestion changes.    IMPRESSION:  1.  Acute hypoxemic respiratory failure.  2.  Acute congestive heart failure, presumed diastolic.  3.  History of atrial fibrillation.  4.  History of chronic anticoagulation.  5.  Status post fall with laceration to left elbow.  6.  Cannot rule out occult pneumonia.  7.  History of urinary retention.  8.  History of dementia.  9.  Acute-on-chronic renal insufficiency, stage III.  10.  Chronic anticoagulation.    PLAN:  Anticipate 2-midnight stay for this patient.  Advanced directives   discussed.  Patient is FULL CODE.  The patient will be diuresed with Lasix 20   mg q. 24 hours.  Followup BNP and ordered an echocardiogram.  Take a followup   x-ray of the chest in the a.m.  Patient was given empiric antibiotics of   Rocephin 2 g q. 24 hours and Zithromax by mouth.  This patient is not septic.    The patient will be on respiratory protocol for oxygen.  We are holding   patient's Norvasc and Cozaar as we do not want the  patient to become   hypotensive while we are diuresing the patient.    Anticipated 2-midnight stay for this patient.       ____________________________________     MD RADHA RANGEL / ESTEPHANIA    DD:  09/04/2017 20:43:22  DT:  09/04/2017 21:09:08    D#:  [id number]  Job#:  [id number]

## 2017-09-05 NOTE — ASSESSMENT & PLAN NOTE
Likely related to atelectasis.  Worsening on 5-6L of o2  Patient had fall recently and having lots of chest pain and not able to take deep breath  History of pulmonary fibrosis from previous CT  BNP elevated but improving  On PO Lasix 20 mg once a day  Aggressive breathing treatment with duoneb, IS  HRCT: no acute issue  Echocardiogram: LVEF: 70%, RVSP: 45-50 mmHg  Continue above treatment, titrate o2 as needed  IS: 10 time per hour  Consulted Dr. Espinosa

## 2017-09-05 NOTE — DISCHARGE PLANNING
Medical Social Work    Referral: Pt discussed at IDT rounds this AM.    Intervention: Per flowsheet, pt lives at [facility].  Pt to undergo Echo cardiogram today.  Pt is currently on 3L O2 and no Home O2 noted.      Plan: SW to give [facility] paperwork.    Care Transition Team Assessment    Information Source  Orientation : Disoriented to Event, Disoriented to Time (forgetful at times)  Information Given By: Other (Comments) (Son: Fabian)         Elopement Risk  Legal Hold: No  Ambulatory or Self Mobile in Wheelchair: No-Not an Elopement Risk  Elopement Risk: Not at Risk for Elopement    Interdisciplinary Discharge Planning  Does Admitting Nurse Feel This Could be a Complex Discharge?: No  Primary Care Physician: [facility]-  (Dr. Ambrocio- cardiology)  Lives with - Patient's Self Care Capacity: Other (Comments)  Support Systems: Family Member(s)  Housing / Facility: Assisted Living Residence  Do You Take your Prescribed Medications Regularly: Yes  Able to Return to Previous ADL's: Future Time w/Therapy  Mobility Issues: Yes  Prior Services: None  Patient Expects to be Discharged to:: assisted living  Durable Medical Equipment: Walker    Discharge Preparedness  What is your plan after discharge?: Other (comment) ([facility])  What are your discharge supports?: Child              Vision / Hearing Impairment  Vision Impairment : Yes  Right Eye Vision: Wears Glasses  Left Eye Vision: Wears Glasses    Values / Beliefs / Concerns  Values / Beliefs Concerns : No    Advance Directive  Advance Directive?: None    Domestic Abuse  Have you ever been the victim of abuse or violence?: No              Anticipated Discharge Information  Anticipated discharge disposition: Assisted living

## 2017-09-05 NOTE — PROGRESS NOTES
Received pt from ER, admitted to 320 with admit orders. Telemetry monitoring in place. Pt oriented to room. Assessment completed. Pt had GLF today, abrasion to left elbow, dressing in place. Admit profile completed with assist of son. POC discussed with pt and pt's son. Dr. Colin at bedside, order to place smith catheter, pt able to void x 2, UA sent. Smith catheter pending at this time. Pt sitting up in bed eating dinner. Call light in reach.

## 2017-09-05 NOTE — PROGRESS NOTES
Report received from NOC RN, POC discussed, walking rounds completed, pt awake, pleasant, forgetful due to dementia, all orders, medications, and lines verified, no s/s distress, call light within reach and will continue to monitor.

## 2017-09-05 NOTE — PROGRESS NOTES
Patient is sleeping. Satting 92-94% on 3 LNC. Appears less tachypneic, from 35-mid 20's. Will continue to monitor.

## 2017-09-05 NOTE — ASSESSMENT & PLAN NOTE
Cr: stabalized  Avoid nephrotoxic medication  Follow CMP in the morning  on lasix to 20 mg once a day

## 2017-09-05 NOTE — PROGRESS NOTES
2 RN skin assessment completed. Pt has abrasion to left elbow, with dressing in place. Heels red and blanching.

## 2017-09-05 NOTE — PROGRESS NOTES
Dr. Colin in to see patient. RN verified with MD re lasix. Per MD to hold it with SBP of 109. MD updated about lactic acid and urine output so far. MD to add antibiotic as well. Will continue to monitor closely.

## 2017-09-06 LAB
ALBUMIN SERPL BCP-MCNC: 3.4 G/DL (ref 3.2–4.9)
ALBUMIN/GLOB SERPL: 0.9 G/DL
ALP SERPL-CCNC: 101 U/L (ref 30–99)
ALT SERPL-CCNC: 14 U/L (ref 2–50)
ANION GAP SERPL CALC-SCNC: 11 MMOL/L (ref 0–11.9)
AST SERPL-CCNC: 23 U/L (ref 12–45)
BACTERIA UR CULT: NORMAL
BASOPHILS # BLD AUTO: 0.2 % (ref 0–1.8)
BASOPHILS # BLD: 0.02 K/UL (ref 0–0.12)
BILIRUB SERPL-MCNC: 0.8 MG/DL (ref 0.1–1.5)
BUN SERPL-MCNC: 42 MG/DL (ref 8–22)
CALCIUM SERPL-MCNC: 8.6 MG/DL (ref 8.4–10.2)
CHLORIDE SERPL-SCNC: 104 MMOL/L (ref 96–112)
CO2 SERPL-SCNC: 23 MMOL/L (ref 20–33)
CREAT SERPL-MCNC: 1.86 MG/DL (ref 0.5–1.4)
EOSINOPHIL # BLD AUTO: 0 K/UL (ref 0–0.51)
EOSINOPHIL NFR BLD: 0 % (ref 0–6.9)
ERYTHROCYTE [DISTWIDTH] IN BLOOD BY AUTOMATED COUNT: 45.2 FL (ref 35.9–50)
GFR SERPL CREATININE-BSD FRML MDRD: 34 ML/MIN/1.73 M 2
GLOBULIN SER CALC-MCNC: 3.9 G/DL (ref 1.9–3.5)
GLUCOSE SERPL-MCNC: 126 MG/DL (ref 65–99)
HCT VFR BLD AUTO: 37.8 % (ref 42–52)
HGB BLD-MCNC: 13 G/DL (ref 14–18)
IMM GRANULOCYTES # BLD AUTO: 0.03 K/UL (ref 0–0.11)
IMM GRANULOCYTES NFR BLD AUTO: 0.3 % (ref 0–0.9)
LYMPHOCYTES # BLD AUTO: 1 K/UL (ref 1–4.8)
LYMPHOCYTES NFR BLD: 9.1 % (ref 22–41)
MCH RBC QN AUTO: 32.7 PG (ref 27–33)
MCHC RBC AUTO-ENTMCNC: 34.4 G/DL (ref 33.7–35.3)
MCV RBC AUTO: 95.2 FL (ref 81.4–97.8)
MONOCYTES # BLD AUTO: 0.62 K/UL (ref 0–0.85)
MONOCYTES NFR BLD AUTO: 5.6 % (ref 0–13.4)
NEUTROPHILS # BLD AUTO: 9.34 K/UL (ref 1.82–7.42)
NEUTROPHILS NFR BLD: 84.8 % (ref 44–72)
NRBC # BLD AUTO: 0 K/UL
NRBC BLD AUTO-RTO: 0 /100 WBC
PLATELET # BLD AUTO: 163 K/UL (ref 164–446)
PMV BLD AUTO: 10.8 FL (ref 9–12.9)
POTASSIUM SERPL-SCNC: 4.2 MMOL/L (ref 3.6–5.5)
PROCALCITONIN SERPL-MCNC: 0.11 NG/ML
PROT SERPL-MCNC: 7.3 G/DL (ref 6–8.2)
RBC # BLD AUTO: 3.97 M/UL (ref 4.7–6.1)
SIGNIFICANT IND 70042: NORMAL
SITE SITE: NORMAL
SODIUM SERPL-SCNC: 138 MMOL/L (ref 135–145)
SOURCE SOURCE: NORMAL
WBC # BLD AUTO: 11 K/UL (ref 4.8–10.8)

## 2017-09-06 PROCEDURE — A9270 NON-COVERED ITEM OR SERVICE: HCPCS | Performed by: FAMILY MEDICINE

## 2017-09-06 PROCEDURE — 80053 COMPREHEN METABOLIC PANEL: CPT

## 2017-09-06 PROCEDURE — 700102 HCHG RX REV CODE 250 W/ 637 OVERRIDE(OP): Performed by: INTERNAL MEDICINE

## 2017-09-06 PROCEDURE — 84145 PROCALCITONIN (PCT): CPT

## 2017-09-06 PROCEDURE — A9270 NON-COVERED ITEM OR SERVICE: HCPCS | Performed by: INTERNAL MEDICINE

## 2017-09-06 PROCEDURE — A9270 NON-COVERED ITEM OR SERVICE: HCPCS | Performed by: HOSPITALIST

## 2017-09-06 PROCEDURE — 94640 AIRWAY INHALATION TREATMENT: CPT

## 2017-09-06 PROCEDURE — 85025 COMPLETE CBC W/AUTO DIFF WBC: CPT

## 2017-09-06 PROCEDURE — 700111 HCHG RX REV CODE 636 W/ 250 OVERRIDE (IP): Performed by: INTERNAL MEDICINE

## 2017-09-06 PROCEDURE — 700102 HCHG RX REV CODE 250 W/ 637 OVERRIDE(OP): Performed by: HOSPITALIST

## 2017-09-06 PROCEDURE — 700101 HCHG RX REV CODE 250: Performed by: INTERNAL MEDICINE

## 2017-09-06 PROCEDURE — 700102 HCHG RX REV CODE 250 W/ 637 OVERRIDE(OP): Performed by: FAMILY MEDICINE

## 2017-09-06 PROCEDURE — 770020 HCHG ROOM/CARE - TELE (206)

## 2017-09-06 PROCEDURE — 99232 SBSQ HOSP IP/OBS MODERATE 35: CPT | Performed by: INTERNAL MEDICINE

## 2017-09-06 RX ORDER — ACETAMINOPHEN 325 MG/1
650 TABLET ORAL EVERY 6 HOURS PRN
Status: DISCONTINUED | OUTPATIENT
Start: 2017-09-06 | End: 2017-09-10 | Stop reason: HOSPADM

## 2017-09-06 RX ORDER — TRAMADOL HYDROCHLORIDE 50 MG/1
50 TABLET ORAL EVERY 8 HOURS PRN
Status: DISCONTINUED | OUTPATIENT
Start: 2017-09-06 | End: 2017-09-08

## 2017-09-06 RX ADMIN — DOCUSATE SODIUM AND SENNOSIDES 2 TABLET: 8.6; 5 TABLET, FILM COATED ORAL at 08:35

## 2017-09-06 RX ADMIN — FUROSEMIDE 20 MG: 10 INJECTION, SOLUTION INTRAMUSCULAR; INTRAVENOUS at 16:09

## 2017-09-06 RX ADMIN — DABIGATRAN ETEXILATE MESYLATE 75 MG: 75 CAPSULE ORAL at 08:36

## 2017-09-06 RX ADMIN — PREDNISONE 50 MG: 50 TABLET ORAL at 08:34

## 2017-09-06 RX ADMIN — TRAMADOL HYDROCHLORIDE 50 MG: 50 TABLET, COATED ORAL at 13:31

## 2017-09-06 RX ADMIN — ESCITALOPRAM OXALATE 10 MG: 10 TABLET ORAL at 08:35

## 2017-09-06 RX ADMIN — IPRATROPIUM BROMIDE AND ALBUTEROL SULFATE 3 ML: .5; 3 SOLUTION RESPIRATORY (INHALATION) at 11:05

## 2017-09-06 RX ADMIN — IPRATROPIUM BROMIDE AND ALBUTEROL SULFATE 3 ML: .5; 3 SOLUTION RESPIRATORY (INHALATION) at 14:44

## 2017-09-06 RX ADMIN — OMEPRAZOLE 20 MG: 20 CAPSULE, DELAYED RELEASE ORAL at 08:36

## 2017-09-06 RX ADMIN — TRAMADOL HYDROCHLORIDE 50 MG: 50 TABLET, COATED ORAL at 22:21

## 2017-09-06 RX ADMIN — DOCUSATE SODIUM AND SENNOSIDES 2 TABLET: 8.6; 5 TABLET, FILM COATED ORAL at 19:54

## 2017-09-06 RX ADMIN — ACETAMINOPHEN 650 MG: 325 TABLET, FILM COATED ORAL at 19:59

## 2017-09-06 RX ADMIN — TAMSULOSIN HYDROCHLORIDE 0.4 MG: 0.4 CAPSULE ORAL at 08:36

## 2017-09-06 RX ADMIN — IPRATROPIUM BROMIDE AND ALBUTEROL SULFATE 3 ML: .5; 3 SOLUTION RESPIRATORY (INHALATION) at 07:06

## 2017-09-06 RX ADMIN — FUROSEMIDE 20 MG: 10 INJECTION, SOLUTION INTRAMUSCULAR; INTRAVENOUS at 05:32

## 2017-09-06 RX ADMIN — DABIGATRAN ETEXILATE MESYLATE 75 MG: 75 CAPSULE ORAL at 19:54

## 2017-09-06 RX ADMIN — ACETAMINOPHEN 650 MG: 325 TABLET, FILM COATED ORAL at 10:06

## 2017-09-06 RX ADMIN — IPRATROPIUM BROMIDE AND ALBUTEROL SULFATE 3 ML: .5; 3 SOLUTION RESPIRATORY (INHALATION) at 19:09

## 2017-09-06 RX ADMIN — ACETAMINOPHEN 650 MG: 325 TABLET, FILM COATED ORAL at 01:54

## 2017-09-06 RX ADMIN — MEMANTINE HYDROCHLORIDE 5 MG: 10 TABLET ORAL at 08:35

## 2017-09-06 ASSESSMENT — PAIN SCALES - GENERAL
PAINLEVEL_OUTOF10: 7
PAINLEVEL_OUTOF10: 2
PAINLEVEL_OUTOF10: 2

## 2017-09-06 ASSESSMENT — ENCOUNTER SYMPTOMS: VOMITING: 1

## 2017-09-06 NOTE — CARE PLAN
Problem: Safety  Goal: Will remain free from falls    Intervention: Implement fall precautions  Room/floor clear. Non skid socks on. Proper signs up. Bed alarm on, bed low and locked. Call light and belonging within reach. Hourly rounding in place to make sure needs are met.       Problem: Infection  Goal: Will remain free from infection    Intervention: Implement standard precautions and perform hand washing before and after patient contact  Hand washing every encounter. IV ports scrubbed with alcohol when hanging medicine. Patient watch for s/s of infection. Patient taught to report s/s of infection, verbalized understanding.       Problem: Venous Thromboembolism (VTW)/Deep Vein Thrombosis (DVT) Prevention:  Goal: Patient will participate in Venous Thrombosis (VTE)/Deep Vein Thrombosis (DVT)Prevention Measures    Intervention: Encourage patient to perform ankle flex, foot rotation, and knee flex exercises in addition to other prophylatic measures every hour while awake  Encouraged patient to perform flexion of the feet while in bed and awake, verbalized understanding.       Problem: Respiratory:  Goal: Respiratory status will improve    Intervention: Educate and encourage coughing and deep breathing  Encouraged to perform

## 2017-09-06 NOTE — PROGRESS NOTES
0137  Paged Dr. Barriga d/t patient c/o pain in the ribs. Awaiting call back.     0137  Spoke to Dr. Barriga. Received Tylenol order 650 mg Q6H PRN for pain.

## 2017-09-06 NOTE — FLOWSHEET NOTE
09/06/17 0706   Events/Summary/Plan   Events/Summary/Plan Tx given   Non-Invasive Resp Device Site Inspection Completed Intact   Interdisciplinary Plan of Care-Goals (Indications)   Obstructive Ventilatory Defect or Pulmonary Disease without Obvious Obstruction Physical Exam / Hyperinflation / Wheezing (bronchospasm)   Interdisciplinary Plan of Care-Outcomes    Bronchodilator Outcome Diminished Wheezing and Volume of Air Movement Increased   Education   Education Yes - Pt. / Family has been Instructed in use of Respiratory Medications and Adverse Reactions   RT Assessment of Delivered Medications   Evaluation of Medication Delivery Daily Yes-- Pt /Family has been Instructed in use of Respiratory Medications and Adverse Reactions   SVN Group   #SVN Performed Yes   Given By: Mouthpiece   Respiratory WDL   Respiratory (WDL) X   Chest Exam   Work Of Breathing / Effort Mild   Respiration (!) 24   Pulse 64   Breath Sounds   Pre/Post Intervention Pre Intervention Assessment  (no changes post tx. expiraory wheezes still heard)   RUL Breath Sounds Clear;Expiratory Wheezes   RML Breath Sounds Clear;Expiratory Wheezes   RLL Breath Sounds Expiratory Wheezes   SARA Breath Sounds Clear;Expiratory Wheezes   LLL Breath Sounds Expiratory Wheezes   Oximetry   Continuous Oximetry Yes   Oxygen   Pulse Oximetry 95 %   O2 (LPM) 5   O2 Daily Delivery Respiratory  Silicone Nasal Cannula        09/06/17 0706   Events/Summary/Plan   Events/Summary/Plan Tx given   Non-Invasive Resp Device Site Inspection Completed Intact   Interdisciplinary Plan of Care-Goals (Indications)   Obstructive Ventilatory Defect or Pulmonary Disease without Obvious Obstruction Physical Exam / Hyperinflation / Wheezing (bronchospasm)   Interdisciplinary Plan of Care-Outcomes    Bronchodilator Outcome Diminished Wheezing and Volume of Air Movement Increased   Education   Education Yes - Pt. / Family has been Instructed in use of Respiratory Medications and Adverse  Reactions   RT Assessment of Delivered Medications   Evaluation of Medication Delivery Daily Yes-- Pt /Family has been Instructed in use of Respiratory Medications and Adverse Reactions   SVN Group   #SVN Performed Yes   Given By: Mouthpiece   Respiratory WDL   Respiratory (WDL) X   Chest Exam   Work Of Breathing / Effort Mild   Respiration (!) 24   Pulse 64   Breath Sounds   Pre/Post Intervention Pre Intervention Assessment  (no changes post tx. expiraory wheezes still heard)   RUL Breath Sounds Clear;Expiratory Wheezes   RML Breath Sounds Clear;Expiratory Wheezes   RLL Breath Sounds Expiratory Wheezes   SARA Breath Sounds Clear;Expiratory Wheezes   LLL Breath Sounds Expiratory Wheezes   Oximetry   Continuous Oximetry Yes   Oxygen   Pulse Oximetry 95 %   O2 (LPM) 5   O2 Daily Delivery Respiratory  Silicone Nasal Cannula

## 2017-09-06 NOTE — FLOWSHEET NOTE
09/05/17 1852   Events/Summary/Plan   Events/Summary/Plan SVN refused  patient staes no history   Interdisciplinary Plan of Care-Goals (Indications)   Obstructive Ventilatory Defect or Pulmonary Disease without Obvious Obstruction Strong Subjective / Objective Improvement;History / Diagnosis   Interdisciplinary Plan of Care-Outcomes    Bronchodilator Outcome Patient at Stable Baseline   Education   Education Yes - Pt. / Family has been Instructed in use of Respiratory Equipment;Yes - Pt. / Family has been Instructed in use of Respiratory Medications and Adverse Reactions   Therapy Not Performed   Type of Therapy Not Performed SVN   Reason Therapy Not Performed Refused   Respiratory WDL   Respiratory (WDL) X   Chest Exam   Pulse 64   Breath Sounds   RUL Breath Sounds Rhonchi   RML Breath Sounds Rhonchi   RLL Breath Sounds Coarse Crackles   SARA Breath Sounds Crackles   LLL Breath Sounds Coarse Crackles   Oxygen   Pulse Oximetry 92 %   O2 (LPM) 4   O2 Daily Delivery Respiratory  Silicone Nasal Cannula

## 2017-09-06 NOTE — PROGRESS NOTES
"Report received from Sally, pt wake, only oriented to self.  Morning assessment completed about 0800; pt only oriented to self.  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  Attempted to reorient that he was in the hospital, but he went on to think [place]. Morning medications given one at time.  Oxygen needs are still 5 liters/min.  According to the son, he does not wear it at home.  Transferred pt to the cardiac chair after breakfast as a 1 person assist.  Had pt brush his teeth, but he did not want a bath at this time.  VSS.    "

## 2017-09-06 NOTE — DISCHARGE PLANNING
Medical Social Work    Referral: Pt discussed at IDT rounds this AM.    Intervention: Per flowsheet, pt lives at [facility].  Pt is currently on 5L O2 and no home O2 noted.  No SS needs identified nor any requests for KLARISSA Contreras during rounds.      Plan: KLARISSA Contreras gave [facility] the d.c paperwork for SE to be completed prior to d.c.

## 2017-09-06 NOTE — FLOWSHEET NOTE
09/06/17 1444   Events/Summary/Plan   Events/Summary/Plan Tx given and IS perfofrmed   Non-Invasive Resp Device Site Inspection Completed Intact   Interdisciplinary Plan of Care-Goals (Indications)   Obstructive Ventilatory Defect or Pulmonary Disease without Obvious Obstruction Physical Exam / Hyperinflation / Wheezing (bronchospasm)   Hyperinflation Protocol Indications Atelectasis Documented by Chest X-Ray   Interdisciplinary Plan of Care-Outcomes    Bronchodilator Outcome Improved Vital Signs and Measures of Gas Exchange   Hyperinflation Protocol Goals/Outcome Improvement in Repeat CXR   Education   Education Yes - Pt. / Family has been Instructed in use of Respiratory Medications and Adverse Reactions;Yes - Pt. / Family has been Instructed in use of Respiratory Equipment   RT Assessment of Delivered Medications   Evaluation of Medication Delivery Daily Yes-- Pt /Family has been Instructed in use of Respiratory Medications and Adverse Reactions   SVN Group   #SVN Performed Yes   Given By: Mouthpiece   Incentive Spirometry Group   Incentive Spirometry Instruction Yes   Breathing Exercises Yes   Incentive Spirometer Volume 500 mL   Incentive Spirometer Date Last Changed 09/06/17   Incentive Spirometer Next Change Date (Q 30 Days) 10/06/17   Respiratory WDL   Respiratory (WDL) X   Chest Exam   Work Of Breathing / Effort Mild   Respiration (!) 22   Pulse 60   Breath Sounds   Pre/Post Intervention Pre Intervention Assessment   RUL Breath Sounds Inspiratory Wheezes;Crackles   RML Breath Sounds Inspiratory Wheezes;Diminished   RLL Breath Sounds Expiratory Wheezes;Diminished   SARA Breath Sounds Inspiratory Wheezes;Crackles   LLL Breath Sounds Expiratory Wheezes;Diminished   Secretions   Cough Non Productive;Congested   Oximetry   Continuous Oximetry Yes   Oxygen   Pulse Oximetry 93 %   O2 (LPM) 5   O2 Daily Delivery Respiratory  Silicone Nasal Cannula

## 2017-09-06 NOTE — PROGRESS NOTES
Report from Ariel PEREZ, care assumed.  Pt up in soraya chair.  Denies sob, dizziness, nausea.  C/o cont L ribcage pain 2nd to fall pta, declines intervention at this time.  Oriented to self and place, somewhat to situation.  1200 - remains up in chair for noon meal.  asst with set-up.  Vss.    1600 - report to José Antonio PEREZ who assumes care.

## 2017-09-06 NOTE — FLOWSHEET NOTE
09/06/17 1107   Events/Summary/Plan   Events/Summary/Plan Tx given   Non-Invasive Resp Device Site Inspection Completed Intact   Interdisciplinary Plan of Care-Goals (Indications)   Obstructive Ventilatory Defect or Pulmonary Disease without Obvious Obstruction PFT / Reduced Airflow   Interdisciplinary Plan of Care-Outcomes    Bronchodilator Outcome Diminished Wheezing and Volume of Air Movement Increased;Improved Vital Signs and Measures of Gas Exchange   Education   Education Yes - Pt. / Family has been Instructed in use of Respiratory Medications and Adverse Reactions   RT Assessment of Delivered Medications   Evaluation of Medication Delivery Daily Yes-- Pt /Family has been Instructed in use of Respiratory Medications and Adverse Reactions   SVN Group   #SVN Performed Yes   Given By: Mouthpiece   Respiratory WDL   Respiratory (WDL) X   Chest Exam   Work Of Breathing / Effort Mild   Respiration (!) 24   Pulse 62   Breath Sounds   Pre/Post Intervention Pre Intervention Assessment   RUL Breath Sounds Crackles;Expiratory Wheezes   RML Breath Sounds Crackles;Expiratory Wheezes   RLL Breath Sounds Expiratory Wheezes   SARA Breath Sounds Crackles;Expiratory Wheezes   LLL Breath Sounds Expiratory Wheezes   Oximetry   Continuous Oximetry Yes   Oxygen   Pulse Oximetry 91 %   O2 (LPM) 5   O2 Daily Delivery Respiratory  Silicone Nasal Cannula

## 2017-09-06 NOTE — PROGRESS NOTES
[facility] Hospitalist Progress Note    Date of Service: 2017    Chief Complaint  89 y.o. male with PMH of dementia, PAF, CKD III admitted 2017 with SOB    Interval Problem Update  Pleasantly demented, his SOB seems to be improving    Consultants/Specialty  none    Disposition  Remain on the floor        Review of Systems   Unable to perform ROS: Dementia   Gastrointestinal: Positive for vomiting.      Physical Exam  Laboratory/Imaging   Hemodynamics  Temp (24hrs), Av.5 °C (97.7 °F), Min:36.3 °C (97.3 °F), Max:36.8 °C (98.3 °F)   Temperature: 36.4 °C (97.6 °F)  Pulse  Av.3  Min: 60  Max: 77 Heart Rate (Monitored): (!) 59  NIBP: 117/74      Respiratory      Respiration: (!) 24, Pulse Oximetry: 95 %, O2 Daily Delivery Respiratory : Silicone Nasal Cannula     Given By:: Mouthpiece, Work Of Breathing / Effort: Mild  RUL Breath Sounds: Clear;Expiratory Wheezes, RML Breath Sounds: Clear;Expiratory Wheezes, RLL Breath Sounds: Expiratory Wheezes, SARA Breath Sounds: Clear;Expiratory Wheezes, LLL Breath Sounds: Expiratory Wheezes    Fluids    Intake/Output Summary (Last 24 hours) at 17 0815  Last data filed at 17 0500   Gross per 24 hour   Intake                0 ml   Output              475 ml   Net             -475 ml       Nutrition  Orders Placed This Encounter   Procedures   • Diet Order     Standing Status:   Standing     Number of Occurrences:   1     Order Specific Question:   Diet:     Answer:   Cardiac [6]     Physical Exam   Constitutional: No distress.   HENT:   Head: Normocephalic and atraumatic.   Mouth/Throat: No oropharyngeal exudate.   Eyes: EOM are normal. Pupils are equal, round, and reactive to light.   Neck: Normal range of motion. Neck supple. No thyromegaly present.   Cardiovascular: Normal rate and regular rhythm.    No murmur heard.  Pulmonary/Chest: No respiratory distress. He has wheezes.   Abdominal: Soft. Bowel sounds are normal. He exhibits no distension. There is no  tenderness.   Musculoskeletal: He exhibits no edema or tenderness.   Neurological: He is alert. No cranial nerve deficit.   Skin: Skin is warm and dry. He is not diaphoretic. No erythema.   Psychiatric:   demented       Recent Labs      09/04/17   1140  09/05/17   0403  09/06/17   0209   WBC  10.7  9.8  11.0*   RBC  3.97*  3.79*  3.97*   HEMOGLOBIN  12.9*  12.2*  13.0*   HEMATOCRIT  38.2*  35.5*  37.8*   MCV  96.2  93.7  95.2   MCH  32.5  32.2  32.7   MCHC  33.8  34.4  34.4   RDW  46.6  45.2  45.2   PLATELETCT  152*  138*  163*   MPV  10.1  10.7  10.8     Recent Labs      09/04/17   1140  09/05/17   0403  09/06/17   0209   SODIUM  135  135  138   POTASSIUM  4.0  3.9  4.2   CHLORIDE  105  103  104   CO2  21  23  23   GLUCOSE  119*  123*  126*   BUN  34*  35*  42*   CREATININE  1.86*  1.77*  1.86*   CALCIUM  8.4  8.2*  8.6     Recent Labs      09/04/17   1140   INR  1.40*     Recent Labs      09/04/17   1140   BNPBTYPENAT  297*           CT   1.  No evidence of interstitial lung disease.    2.  Motion degraded study.    3.  Atelectasis versus scarring within the lung bases bilaterally.    4.  Atherosclerotic vascular calcification.    5.  Eventration of the right hemidiaphragm.   Assessment/Plan     Acute respiratory failure with hypoxia (CMS-HCC)   Assessment & Plan    Likely related to acute systolic heart failure vs COPD/interstitial lung disease?  History of pulmonary fibrosis from previous CT  BNP elevated  On IV Lasix 20 mg twice a day  Aggressive breathing treatment, steroid  HRCT: no acute issue  Echocardiogram: LVEF: 70%, RVSP: 45-50 mmHg  Continue above treatment, titrate o2 as needed        Lactic acidosis   Assessment & Plan    Resolved with IV fluid        Chronic kidney disease, stage III (moderate)   Assessment & Plan    Stable  Avoid nephrotoxic medication  Follow CMP in the morning        Thrombocytopenia (CMS-HCC)- (present on admission)   Assessment & Plan    Follow CBC in a.m.        Dementia-  (present on admission)   Assessment & Plan    Continue me tiffaniee        Paroxysmal atrial fibrillation (CMS-HCC)- (present on admission)   Assessment & Plan    Rate control  On digoxin and pradaxa            Reviewed items::  Labs reviewed, Medications reviewed and Radiology images reviewed  DVT: pradaxa.

## 2017-09-06 NOTE — PROGRESS NOTES
BS report received. Patient in bed, alert and oriented, family at BS. No c/o pain or nausea at this time. RT in the room for treatment. Monitor alarm verified. POC discussed, verbalized understanding. Bed low and locked, bed alarm on. Call light and belonging within reach, bed alarm on. Fall precaution in effect. Will continue to monitor closely.

## 2017-09-07 LAB
ALBUMIN SERPL BCP-MCNC: 3 G/DL (ref 3.2–4.9)
ALBUMIN/GLOB SERPL: 0.8 G/DL
ALP SERPL-CCNC: 92 U/L (ref 30–99)
ALT SERPL-CCNC: 11 U/L (ref 2–50)
ANION GAP SERPL CALC-SCNC: 9 MMOL/L (ref 0–11.9)
AST SERPL-CCNC: 22 U/L (ref 12–45)
BILIRUB SERPL-MCNC: 0.8 MG/DL (ref 0.1–1.5)
BUN SERPL-MCNC: 53 MG/DL (ref 8–22)
CALCIUM SERPL-MCNC: 8.2 MG/DL (ref 8.4–10.2)
CHLORIDE SERPL-SCNC: 103 MMOL/L (ref 96–112)
CO2 SERPL-SCNC: 23 MMOL/L (ref 20–33)
CREAT SERPL-MCNC: 1.93 MG/DL (ref 0.5–1.4)
GFR SERPL CREATININE-BSD FRML MDRD: 33 ML/MIN/1.73 M 2
GLOBULIN SER CALC-MCNC: 3.6 G/DL (ref 1.9–3.5)
GLUCOSE SERPL-MCNC: 142 MG/DL (ref 65–99)
POTASSIUM SERPL-SCNC: 4.1 MMOL/L (ref 3.6–5.5)
PROT SERPL-MCNC: 6.6 G/DL (ref 6–8.2)
SODIUM SERPL-SCNC: 135 MMOL/L (ref 135–145)

## 2017-09-07 PROCEDURE — A9270 NON-COVERED ITEM OR SERVICE: HCPCS | Performed by: FAMILY MEDICINE

## 2017-09-07 PROCEDURE — A9270 NON-COVERED ITEM OR SERVICE: HCPCS | Performed by: HOSPITALIST

## 2017-09-07 PROCEDURE — 700102 HCHG RX REV CODE 250 W/ 637 OVERRIDE(OP): Performed by: HOSPITALIST

## 2017-09-07 PROCEDURE — G8978 MOBILITY CURRENT STATUS: HCPCS | Mod: CL

## 2017-09-07 PROCEDURE — 94640 AIRWAY INHALATION TREATMENT: CPT

## 2017-09-07 PROCEDURE — G8979 MOBILITY GOAL STATUS: HCPCS | Mod: CK

## 2017-09-07 PROCEDURE — A9270 NON-COVERED ITEM OR SERVICE: HCPCS | Performed by: INTERNAL MEDICINE

## 2017-09-07 PROCEDURE — 700102 HCHG RX REV CODE 250 W/ 637 OVERRIDE(OP): Performed by: INTERNAL MEDICINE

## 2017-09-07 PROCEDURE — 97162 PT EVAL MOD COMPLEX 30 MIN: CPT

## 2017-09-07 PROCEDURE — 99233 SBSQ HOSP IP/OBS HIGH 50: CPT | Performed by: INTERNAL MEDICINE

## 2017-09-07 PROCEDURE — 700102 HCHG RX REV CODE 250 W/ 637 OVERRIDE(OP): Performed by: FAMILY MEDICINE

## 2017-09-07 PROCEDURE — 700101 HCHG RX REV CODE 250: Performed by: INTERNAL MEDICINE

## 2017-09-07 PROCEDURE — 700111 HCHG RX REV CODE 636 W/ 250 OVERRIDE (IP): Performed by: INTERNAL MEDICINE

## 2017-09-07 PROCEDURE — 80053 COMPREHEN METABOLIC PANEL: CPT

## 2017-09-07 PROCEDURE — 770020 HCHG ROOM/CARE - TELE (206)

## 2017-09-07 RX ORDER — CYCLOBENZAPRINE HCL 10 MG
5 TABLET ORAL 3 TIMES DAILY PRN
Status: DISCONTINUED | OUTPATIENT
Start: 2017-09-07 | End: 2017-09-10 | Stop reason: HOSPADM

## 2017-09-07 RX ORDER — IPRATROPIUM BROMIDE AND ALBUTEROL SULFATE 2.5; .5 MG/3ML; MG/3ML
3 SOLUTION RESPIRATORY (INHALATION)
Status: DISCONTINUED | OUTPATIENT
Start: 2017-09-07 | End: 2017-09-08

## 2017-09-07 RX ORDER — FUROSEMIDE 10 MG/ML
20 INJECTION INTRAMUSCULAR; INTRAVENOUS
Status: DISCONTINUED | OUTPATIENT
Start: 2017-09-08 | End: 2017-09-08

## 2017-09-07 RX ORDER — OXYCODONE HYDROCHLORIDE 5 MG/1
5 TABLET ORAL
Status: COMPLETED | OUTPATIENT
Start: 2017-09-07 | End: 2017-09-07

## 2017-09-07 RX ADMIN — TRAMADOL HYDROCHLORIDE 50 MG: 50 TABLET, COATED ORAL at 20:02

## 2017-09-07 RX ADMIN — ESCITALOPRAM OXALATE 10 MG: 10 TABLET ORAL at 08:27

## 2017-09-07 RX ADMIN — IPRATROPIUM BROMIDE AND ALBUTEROL SULFATE 3 ML: .5; 3 SOLUTION RESPIRATORY (INHALATION) at 06:38

## 2017-09-07 RX ADMIN — TRAMADOL HYDROCHLORIDE 50 MG: 50 TABLET, COATED ORAL at 06:15

## 2017-09-07 RX ADMIN — ACETAMINOPHEN 650 MG: 325 TABLET, FILM COATED ORAL at 08:27

## 2017-09-07 RX ADMIN — OXYCODONE HYDROCHLORIDE 5 MG: 5 TABLET ORAL at 15:41

## 2017-09-07 RX ADMIN — TRAMADOL HYDROCHLORIDE 50 MG: 50 TABLET, COATED ORAL at 13:38

## 2017-09-07 RX ADMIN — OMEPRAZOLE 20 MG: 20 CAPSULE, DELAYED RELEASE ORAL at 08:27

## 2017-09-07 RX ADMIN — IPRATROPIUM BROMIDE AND ALBUTEROL SULFATE 3 ML: .5; 3 SOLUTION RESPIRATORY (INHALATION) at 19:47

## 2017-09-07 RX ADMIN — IPRATROPIUM BROMIDE AND ALBUTEROL SULFATE 3 ML: .5; 3 SOLUTION RESPIRATORY (INHALATION) at 10:21

## 2017-09-07 RX ADMIN — FUROSEMIDE 20 MG: 10 INJECTION, SOLUTION INTRAMUSCULAR; INTRAVENOUS at 08:48

## 2017-09-07 RX ADMIN — MEMANTINE HYDROCHLORIDE 5 MG: 10 TABLET ORAL at 08:27

## 2017-09-07 RX ADMIN — IPRATROPIUM BROMIDE AND ALBUTEROL SULFATE 3 ML: .5; 3 SOLUTION RESPIRATORY (INHALATION) at 15:17

## 2017-09-07 RX ADMIN — DABIGATRAN ETEXILATE MESYLATE 75 MG: 75 CAPSULE ORAL at 08:48

## 2017-09-07 RX ADMIN — DABIGATRAN ETEXILATE MESYLATE 75 MG: 75 CAPSULE ORAL at 20:02

## 2017-09-07 RX ADMIN — DOCUSATE SODIUM AND SENNOSIDES 2 TABLET: 8.6; 5 TABLET, FILM COATED ORAL at 20:01

## 2017-09-07 RX ADMIN — TAMSULOSIN HYDROCHLORIDE 0.4 MG: 0.4 CAPSULE ORAL at 08:27

## 2017-09-07 RX ADMIN — DOCUSATE SODIUM AND SENNOSIDES 2 TABLET: 8.6; 5 TABLET, FILM COATED ORAL at 08:27

## 2017-09-07 ASSESSMENT — GAIT ASSESSMENTS
GAIT LEVEL OF ASSIST: MINIMAL ASSIST
DISTANCE (FEET): 5
DEVIATION: STEP TO
ASSISTIVE DEVICE: FRONT WHEEL WALKER

## 2017-09-07 ASSESSMENT — PAIN SCALES - GENERAL
PAINLEVEL_OUTOF10: 7
PAINLEVEL_OUTOF10: 5
PAINLEVEL_OUTOF10: 7
PAINLEVEL_OUTOF10: 5
PAINLEVEL_OUTOF10: 6
PAINLEVEL_OUTOF10: 4

## 2017-09-07 NOTE — FLOWSHEET NOTE
09/07/17 1652   Events/Summary/Plan   Events/Summary/Plan Decreased O2 to 4L, pt tolerating well   Chest Exam   Pulse 60   Oxygen   Pulse Oximetry 95 %   O2 (LPM) 4

## 2017-09-07 NOTE — CARE PLAN
Problem: Safety  Goal: Will remain free from injury  Outcome: PROGRESSING AS EXPECTED  Pt will remain free from injury. Pt has been educated on the call light and demonstrates its use appropriately. Fall precautions in place. Bed alarm on and pt in view of nursing station.     Problem: Urinary Elimination:  Goal: Ability to reestablish a normal urinary elimination pattern will improve  Outcome: PROGRESSING AS EXPECTED  Pt will maintain normal urinary elimination. Pt assisted with urinal for voiding.

## 2017-09-07 NOTE — PROGRESS NOTES
0700-Report from ANA Glover. POC reviewed. Pt sleeping at this time. Will continue to monitor.     0827-Morning meds given to pt. Tolerated well with water. C/o left rib cage pain. Provided with tylenol and heat pack.     1000-Message left with physical therapy regarding what time they will work with pt.

## 2017-09-07 NOTE — RESPIRATORY CARE
COPD EDUCATION by COPD CLINICAL EDUCATOR  9/7/2017 at 7:42 AM by Kasandra Cleary     Patient reviewed by COPD education team. Patient does not qualify for COPD program. Dementia.

## 2017-09-07 NOTE — FLOWSHEET NOTE
09/07/17 1022   Events/Summary/Plan   Events/Summary/Plan Tx given and IS performed   Non-Invasive Resp Device Site Inspection Completed Intact   Interdisciplinary Plan of Care-Goals (Indications)   Obstructive Ventilatory Defect or Pulmonary Disease without Obvious Obstruction Improved Peak Flow Pre / Post Bronchodilator with 15% Improvement   Hyperinflation Protocol Indications Atelectasis Documented by Chest X-Ray   Interdisciplinary Plan of Care-Outcomes    Bronchodilator Outcome Improved Vital Signs and Measures of Gas Exchange   Hyperinflation Protocol Goals/Outcome Improvement in Repeat CXR;Increased Vital Capacity or Return to Pre-operative Values   Education   Education Yes - Pt. / Family has been Instructed in use of Respiratory Medications and Adverse Reactions;Yes - Pt. / Family has been Instructed in use of Respiratory Equipment   RT Assessment of Delivered Medications   Evaluation of Medication Delivery Daily Yes-- Pt /Family has been Instructed in use of Respiratory Medications and Adverse Reactions   SVN Group   #SVN Performed Yes   Given By: Mouthpiece   Incentive Spirometry Group   Incentive Spirometry Instruction Yes   Breathing Exercises Yes   Incentive Spirometer Volume 500 mL  (750 with good effort)   Respiratory WDL   Respiratory (WDL) X   Chest Exam   Work Of Breathing / Effort Mild   Respiration 18   Pulse 61   Breath Sounds   Pre/Post Intervention Pre Intervention Assessment   RUL Breath Sounds Crackles;Expiratory Wheezes   RML Breath Sounds Crackles;Expiratory Wheezes   RLL Breath Sounds Expiratory Wheezes   SARA Breath Sounds Crackles;Expiratory Wheezes   LLL Breath Sounds Expiratory Wheezes   Oximetry   Continuous Oximetry Yes   Oxygen   Pulse Oximetry 90 %   O2 (LPM) 4   O2 Daily Delivery Respiratory  Silicone Nasal Cannula

## 2017-09-07 NOTE — THERAPY
"Physical Therapy Evaluation completed.   Bed Mobility:  Supine to Sit: Moderate Assist  Transfers: Sit to Stand: Minimal Assist  Gait: Level Of Assist: Minimal Assist with Front-Wheel Walker 5 ft to chair.        Plan of Care: Will benefit from Physical Therapy 3 times per week  Discharge Recommendations: Equipment: Will Continue to Assess for Equipment Needs. Post-acute therapy Discharge to a transitional care facility for continued skilled therapy services.    89 yo male admitted w/ acute diastolic CHF and fall. Pt continues to have intermittent L sided rib pain at rest and with activity. Nursing premedicated him prior to PT evaluation. At this time, he is below his baseline functional level and therefore will benefit from further acute and post acute therapy PRIOR to return to his [facility]. RN present throughout and aware of PT recommendations.     See \"Rehab Therapy-Acute\" Patient Summary Report for complete documentation.     "

## 2017-09-07 NOTE — DISCHARGE PLANNING
Medical Social Work    Referral: Pt discussed at IDT rounds this AM.    Intervention: Per jarrod, pt lives at [facility].      Plan: KLARISSA gave SE paperwork to [facility] RN Carolyn who has completed but medication will need to be added and doctor's signature once d.c is decided.

## 2017-09-07 NOTE — FLOWSHEET NOTE
09/07/17 0638   Events/Summary/Plan   Events/Summary/Plan Tx given, IS done   Non-Invasive Resp Device Site Inspection Completed Intact   Interdisciplinary Plan of Care-Goals (Indications)   Obstructive Ventilatory Defect or Pulmonary Disease without Obvious Obstruction PFT / Reduced Airflow   Hyperinflation Protocol Indications Atelectasis Documented by Chest X-Ray   Interdisciplinary Plan of Care-Outcomes    Bronchodilator Outcome Improved Vital Signs and Measures of Gas Exchange   Hyperinflation Protocol Goals/Outcome Improvement in Repeat CXR   Education   Education Yes - Pt. / Family has been Instructed in use of Respiratory Medications and Adverse Reactions;Yes - Pt. / Family has been Instructed in use of Respiratory Equipment   RT Assessment of Delivered Medications   Evaluation of Medication Delivery Daily Yes-- Pt /Family has been Instructed in use of Respiratory Medications and Adverse Reactions   SVN Group   #SVN Performed Yes   Given By: Mouthpiece   Date SVN Last Changed 09/07/17   Date SVN Next Change Due (Q 7 Days) 09/14/17   Incentive Spirometry Group   Incentive Spirometry Instruction Yes   Breathing Exercises Yes   Incentive Spirometer Volume 750 mL   Respiratory WDL   Respiratory (WDL) X   Chest Exam   Work Of Breathing / Effort Mild   Respiration 18   Pulse 62   Breath Sounds   Pre/Post Intervention Pre Intervention Assessment   RUL Breath Sounds Expiratory Wheezes;Diminished   RML Breath Sounds Crackles;Diminished   RLL Breath Sounds Diminished   SARA Breath Sounds Diminished;Crackles   LLL Breath Sounds Diminished   Oximetry   Continuous Oximetry Yes   Oxygen   Pulse Oximetry 92 %   O2 (LPM) 4   O2 Daily Delivery Respiratory  Silicone Nasal Cannula

## 2017-09-07 NOTE — CARE PLAN
Problem: Oxygenation:  Goal: Maintain adequate oxygenation dependent on patient condition  Pt remains on 4L NC. Will continue to monitor pt     Problem: Hyperinflation:  Goal: Prevent or improve atelectasis  Outcome: PROGRESSING AS EXPECTED  Pt is not taking a deep breath due to pain in his left side. Will continue to do IS and encourage pt to do IS 5-10 every one hr.

## 2017-09-07 NOTE — PROGRESS NOTES
1900: Assisted pt back to bed from the chair. Pt still has SOB with exertion. Pt became wheezy and his RR went up to 24. RT arrived at bedside shortly after that to give pt a scheduled breathing treatment.     1910: Received report from ANA Chou.    [id number]: Assessment complete. Pt c/o left rib cage pain. Gave pt prn tylenol and a warm blanket.

## 2017-09-07 NOTE — FLOWSHEET NOTE
09/07/17 1649   Events/Summary/Plan   Events/Summary/Plan IS done   Non-Invasive Resp Device Site Inspection Completed Intact   Interdisciplinary Plan of Care-Goals (Indications)   Hyperinflation Protocol Indications Atelectasis Documented by Chest X-Ray   Interdisciplinary Plan of Care-Outcomes    Hyperinflation Protocol Goals/Outcome Improvement in Repeat CXR   Education   Education Yes - Pt. / Family has been Instructed in use of Respiratory Equipment   Incentive Spirometry Group   Incentive Spirometry Instruction Yes   Breathing Exercises Yes   Incentive Spirometer Volume 500 mL   Respiratory WDL   Respiratory (WDL) X   Chest Exam   Work Of Breathing / Effort Mild   Respiration 20   Pulse 60   Breath Sounds   Pre/Post Intervention Pre Intervention Assessment   RUL Breath Sounds Crackles;Expiratory Wheezes   RML Breath Sounds Crackles;Expiratory Wheezes   RLL Breath Sounds Crackles;Expiratory Wheezes   SARA Breath Sounds Crackles;Expiratory Wheezes   LLL Breath Sounds Crackles;Expiratory Wheezes   Secretions   Cough Non Productive   How Sputum Obtained Cough on Request   Oximetry   Continuous Oximetry Yes   Oxygen   Pulse Oximetry 95 %   O2 (LPM) 5   O2 Daily Delivery Respiratory  Silicone Nasal Cannula

## 2017-09-07 NOTE — PROGRESS NOTES
Report received from ANA Stewart.  Care taken over at this time.  1600 vitals taken.  Patient assisted with urinal.  Lasixs given per MAR.  Patient resting in cardiac chair at this time.

## 2017-09-07 NOTE — CARE PLAN
Problem: Communication  Goal: The ability to communicate needs accurately and effectively will improve    Intervention: Reorient patient to environment as needed  Pt oriented to self only tonight. Pt requires frequent reorientation about place and situation.       Problem: Respiratory:  Goal: Respiratory status will improve    Intervention: Assess and monitor pulmonary status  Respirations are even and unlabored at rest, but pt becomes tachypneic and SOB with minimal exertion.

## 2017-09-07 NOTE — PROGRESS NOTES
[facility] Hospitalist Progress Note    Date of Service: 2017    Chief Complaint  89 y.o. male with PMH of dementia, PAF, CKD III admitted 2017 with SOB    Interval Problem Update  Pleasantly demented. Having lots of pain on the left chest area. Watched him do IS but only able to do about 500cc. Told RN to work with him on IS.    Consultants/Specialty  none    Disposition  Remain on the floor        Review of Systems   Unable to perform ROS: Dementia   Cardiovascular: Positive for chest pain.      Physical Exam  Laboratory/Imaging   Hemodynamics  Temp (24hrs), Av.7 °C (98 °F), Min:36.6 °C (97.8 °F), Max:36.8 °C (98.2 °F)   Temperature: 36.8 °C (98.2 °F)  Pulse  Av.6  Min: 60  Max: 81 Heart Rate (Monitored): 61  Blood Pressure : 127/70, NIBP: 129/77      Respiratory      Respiration: 18, Pulse Oximetry: 92 %, O2 Daily Delivery Respiratory : Silicone Nasal Cannula     Given By:: Mouthpiece, Work Of Breathing / Effort: Mild  RUL Breath Sounds: Expiratory Wheezes;Diminished, RML Breath Sounds: Crackles;Diminished, RLL Breath Sounds: Diminished, SARA Breath Sounds: Diminished;Crackles, LLL Breath Sounds: Diminished    Fluids    Intake/Output Summary (Last 24 hours) at 17 0750  Last data filed at 17 0600   Gross per 24 hour   Intake              490 ml   Output              850 ml   Net             -360 ml       Nutrition  Orders Placed This Encounter   Procedures   • Diet Order     Standing Status:   Standing     Number of Occurrences:   1     Order Specific Question:   Diet:     Answer:   Cardiac [6]     Physical Exam   Constitutional: No distress.   HENT:   Head: Normocephalic and atraumatic.   Mouth/Throat: No oropharyngeal exudate.   Eyes: Conjunctivae and EOM are normal. Pupils are equal, round, and reactive to light.   Neck: Normal range of motion. Neck supple. No tracheal deviation present. No thyromegaly present.   Cardiovascular: Normal rate and regular rhythm.    No murmur  heard.  Pulmonary/Chest: No respiratory distress. He has wheezes.   Abdominal: Soft. Bowel sounds are normal. He exhibits no distension.   Musculoskeletal: He exhibits no edema or tenderness.   Neurological: He is alert. No cranial nerve deficit.   Skin: Skin is warm and dry. He is not diaphoretic.   Psychiatric:   demented       Recent Labs      09/04/17   1140  09/05/17   0403  09/06/17   0209   WBC  10.7  9.8  11.0*   RBC  3.97*  3.79*  3.97*   HEMOGLOBIN  12.9*  12.2*  13.0*   HEMATOCRIT  38.2*  35.5*  37.8*   MCV  96.2  93.7  95.2   MCH  32.5  32.2  32.7   MCHC  33.8  34.4  34.4   RDW  46.6  45.2  45.2   PLATELETCT  152*  138*  163*   MPV  10.1  10.7  10.8     Recent Labs      09/05/17   0403  09/06/17   0209  09/07/17   0315   SODIUM  135  138  135   POTASSIUM  3.9  4.2  4.1   CHLORIDE  103  104  103   CO2  23  23  23   GLUCOSE  123*  126*  142*   BUN  35*  42*  53*   CREATININE  1.77*  1.86*  1.93*   CALCIUM  8.2*  8.6  8.2*     Recent Labs      09/04/17   1140   INR  1.40*     Recent Labs      09/04/17   1140   BNPBTYPENAT  297*           CT   1.  No evidence of interstitial lung disease.    2.  Motion degraded study.    3.  Atelectasis versus scarring within the lung bases bilaterally.    4.  Atherosclerotic vascular calcification.    5.  Eventration of the right hemidiaphragm.   Assessment/Plan     Acute respiratory failure with hypoxia (CMS-HCC)   Assessment & Plan    Likely related to atelectasis.  Patient had fall recently and having lots of chest pain and not able to take deep breath  History of pulmonary fibrosis from previous CT  BNP elevated  On IV Lasix 20 mg once a day  Aggressive breathing treatment with duoneb  HRCT: no acute issue  Echocardiogram: LVEF: 70%, RVSP: 45-50 mmHg  Continue above treatment, titrate o2 as needed  IS: 10 time per hour        Lactic acidosis   Assessment & Plan    Resolved with IV fluid        Chronic kidney disease, stage III (moderate)   Assessment & Plan    Cr:  worsening  Avoid nephrotoxic medication  Follow CMP in the morning  Decrease lasix to 20 mg once a day        Thrombocytopenia (CMS-[facility])- (present on admission)   Assessment & Plan    Follow CBC in a.m.        Dementia- (present on admission)   Assessment & Plan    Continue me mementine        Paroxysmal atrial fibrillation (CMS-[facility])- (present on admission)   Assessment & Plan    Rate control  On digoxin and pradaxa            Reviewed items::  Labs reviewed, Medications reviewed and Radiology images reviewed  DVT: pradaxa.

## 2017-09-07 NOTE — FLOWSHEET NOTE
09/06/17 1909   Events/Summary/Plan   Events/Summary/Plan SVN   Interdisciplinary Plan of Care-Goals (Indications)   Obstructive Ventilatory Defect or Pulmonary Disease without Obvious Obstruction History / Diagnosis   Interdisciplinary Plan of Care-Outcomes    Bronchodilator Outcome Patient at Stable Baseline   Education   Education Yes - Pt. / Family has been Instructed in use of Respiratory Equipment;Yes - Pt. / Family has been Instructed in use of Respiratory Medications and Adverse Reactions   RT Assessment of Delivered Medications   Evaluation of Medication Delivery Daily Yes-- Pt /Family has been Instructed in use of Respiratory Medications and Adverse Reactions   SVN Group   #SVN Performed Yes   Given By: Mouthpiece   Respiratory WDL   Respiratory (WDL) X   Chest Exam   Respiration 18   Pulse 81   Breath Sounds   Pre/Post Intervention Post Intervention Assessment   RUL Breath Sounds Crackles;Expiratory Wheezes;Coarse Crackles   RML Breath Sounds Diminished;Expiratory Wheezes;Coarse Crackles   RLL Breath Sounds Expiratory Wheezes;Diminished   SARA Breath Sounds Crackles;Expiratory Wheezes   LLL Breath Sounds Expiratory Wheezes;Diminished   Oximetry   Continuous Oximetry Yes   Oxygen   Pulse Oximetry 92 %   O2 (LPM) 4   O2 Daily Delivery Respiratory  Silicone Nasal Cannula

## 2017-09-07 NOTE — PROGRESS NOTES
Physical therapy at bedside to work with patient. Pt assisted up to chair with x1 assist. C/o sharp left side rib pain with movement. Noted to have increased dyspnea. RT at bedside to provide breathing treatment. Family at bedside.

## 2017-09-08 LAB
ALBUMIN SERPL BCP-MCNC: 3.3 G/DL (ref 3.2–4.9)
ALBUMIN/GLOB SERPL: 0.9 G/DL
ALP SERPL-CCNC: 97 U/L (ref 30–99)
ALT SERPL-CCNC: 13 U/L (ref 2–50)
ANION GAP SERPL CALC-SCNC: 11 MMOL/L (ref 0–11.9)
AST SERPL-CCNC: 27 U/L (ref 12–45)
BILIRUB SERPL-MCNC: 1 MG/DL (ref 0.1–1.5)
BNP SERPL-MCNC: 187 PG/ML (ref 0–100)
BUN SERPL-MCNC: 54 MG/DL (ref 8–22)
CALCIUM SERPL-MCNC: 8.1 MG/DL (ref 8.4–10.2)
CHLORIDE SERPL-SCNC: 100 MMOL/L (ref 96–112)
CO2 SERPL-SCNC: 24 MMOL/L (ref 20–33)
CREAT SERPL-MCNC: 1.91 MG/DL (ref 0.5–1.4)
EKG IMPRESSION: NORMAL
ERYTHROCYTE [DISTWIDTH] IN BLOOD BY AUTOMATED COUNT: 44.8 FL (ref 35.9–50)
GFR SERPL CREATININE-BSD FRML MDRD: 33 ML/MIN/1.73 M 2
GLOBULIN SER CALC-MCNC: 3.8 G/DL (ref 1.9–3.5)
GLUCOSE SERPL-MCNC: 107 MG/DL (ref 65–99)
HCT VFR BLD AUTO: 37 % (ref 42–52)
HGB BLD-MCNC: 12.6 G/DL (ref 14–18)
MCH RBC QN AUTO: 31.8 PG (ref 27–33)
MCHC RBC AUTO-ENTMCNC: 34.1 G/DL (ref 33.7–35.3)
MCV RBC AUTO: 93.4 FL (ref 81.4–97.8)
PLATELET # BLD AUTO: 163 K/UL (ref 164–446)
PMV BLD AUTO: 10.7 FL (ref 9–12.9)
POTASSIUM SERPL-SCNC: 3.7 MMOL/L (ref 3.6–5.5)
PROCALCITONIN SERPL-MCNC: 0.07 NG/ML
PROT SERPL-MCNC: 7.1 G/DL (ref 6–8.2)
RBC # BLD AUTO: 3.96 M/UL (ref 4.7–6.1)
SODIUM SERPL-SCNC: 135 MMOL/L (ref 135–145)
TROPONIN I SERPL-MCNC: 0.02 NG/ML (ref 0–0.04)
WBC # BLD AUTO: 12.3 K/UL (ref 4.8–10.8)

## 2017-09-08 PROCEDURE — G8987 SELF CARE CURRENT STATUS: HCPCS | Mod: CL

## 2017-09-08 PROCEDURE — 700101 HCHG RX REV CODE 250: Performed by: INTERNAL MEDICINE

## 2017-09-08 PROCEDURE — 700111 HCHG RX REV CODE 636 W/ 250 OVERRIDE (IP): Performed by: INTERNAL MEDICINE

## 2017-09-08 PROCEDURE — 94668 MNPJ CHEST WALL SBSQ: CPT

## 2017-09-08 PROCEDURE — 84145 PROCALCITONIN (PCT): CPT

## 2017-09-08 PROCEDURE — 94760 N-INVAS EAR/PLS OXIMETRY 1: CPT

## 2017-09-08 PROCEDURE — 97165 OT EVAL LOW COMPLEX 30 MIN: CPT

## 2017-09-08 PROCEDURE — 94667 MNPJ CHEST WALL 1ST: CPT

## 2017-09-08 PROCEDURE — 93010 ELECTROCARDIOGRAM REPORT: CPT | Performed by: INTERNAL MEDICINE

## 2017-09-08 PROCEDURE — 83880 ASSAY OF NATRIURETIC PEPTIDE: CPT

## 2017-09-08 PROCEDURE — 80053 COMPREHEN METABOLIC PANEL: CPT

## 2017-09-08 PROCEDURE — A9270 NON-COVERED ITEM OR SERVICE: HCPCS | Performed by: FAMILY MEDICINE

## 2017-09-08 PROCEDURE — 97535 SELF CARE MNGMENT TRAINING: CPT

## 2017-09-08 PROCEDURE — 94640 AIRWAY INHALATION TREATMENT: CPT

## 2017-09-08 PROCEDURE — 700102 HCHG RX REV CODE 250 W/ 637 OVERRIDE(OP): Performed by: HOSPITALIST

## 2017-09-08 PROCEDURE — 93005 ELECTROCARDIOGRAM TRACING: CPT | Performed by: INTERNAL MEDICINE

## 2017-09-08 PROCEDURE — G8988 SELF CARE GOAL STATUS: HCPCS | Mod: CK

## 2017-09-08 PROCEDURE — 700102 HCHG RX REV CODE 250 W/ 637 OVERRIDE(OP): Performed by: INTERNAL MEDICINE

## 2017-09-08 PROCEDURE — 84484 ASSAY OF TROPONIN QUANT: CPT

## 2017-09-08 PROCEDURE — A9270 NON-COVERED ITEM OR SERVICE: HCPCS | Performed by: INTERNAL MEDICINE

## 2017-09-08 PROCEDURE — A9270 NON-COVERED ITEM OR SERVICE: HCPCS | Performed by: HOSPITALIST

## 2017-09-08 PROCEDURE — 770020 HCHG ROOM/CARE - TELE (206)

## 2017-09-08 PROCEDURE — 85027 COMPLETE CBC AUTOMATED: CPT

## 2017-09-08 PROCEDURE — 99233 SBSQ HOSP IP/OBS HIGH 50: CPT | Performed by: INTERNAL MEDICINE

## 2017-09-08 PROCEDURE — 700102 HCHG RX REV CODE 250 W/ 637 OVERRIDE(OP): Performed by: FAMILY MEDICINE

## 2017-09-08 RX ORDER — MIDAZOLAM HYDROCHLORIDE 1 MG/ML
INJECTION INTRAMUSCULAR; INTRAVENOUS
Status: ACTIVE
Start: 2017-09-08 | End: 2017-09-08

## 2017-09-08 RX ORDER — OXYCODONE HYDROCHLORIDE 5 MG/1
5 TABLET ORAL EVERY 8 HOURS PRN
Status: DISCONTINUED | OUTPATIENT
Start: 2017-09-08 | End: 2017-09-10 | Stop reason: HOSPADM

## 2017-09-08 RX ORDER — IPRATROPIUM BROMIDE AND ALBUTEROL SULFATE 2.5; .5 MG/3ML; MG/3ML
3 SOLUTION RESPIRATORY (INHALATION)
Status: DISCONTINUED | OUTPATIENT
Start: 2017-09-08 | End: 2017-09-10 | Stop reason: HOSPADM

## 2017-09-08 RX ORDER — FUROSEMIDE 20 MG/1
20 TABLET ORAL
Status: DISCONTINUED | OUTPATIENT
Start: 2017-09-08 | End: 2017-09-10 | Stop reason: HOSPADM

## 2017-09-08 RX ADMIN — CYCLOBENZAPRINE HYDROCHLORIDE 5 MG: 10 TABLET, FILM COATED ORAL at 02:54

## 2017-09-08 RX ADMIN — OXYCODONE HYDROCHLORIDE 5 MG: 5 TABLET ORAL at 12:37

## 2017-09-08 RX ADMIN — DOCUSATE SODIUM AND SENNOSIDES 2 TABLET: 8.6; 5 TABLET, FILM COATED ORAL at 09:01

## 2017-09-08 RX ADMIN — ACETAMINOPHEN 650 MG: 325 TABLET, FILM COATED ORAL at 15:57

## 2017-09-08 RX ADMIN — DOCUSATE SODIUM AND SENNOSIDES 2 TABLET: 8.6; 5 TABLET, FILM COATED ORAL at 20:35

## 2017-09-08 RX ADMIN — OMEPRAZOLE 20 MG: 20 CAPSULE, DELAYED RELEASE ORAL at 09:01

## 2017-09-08 RX ADMIN — FUROSEMIDE 20 MG: 10 INJECTION, SOLUTION INTRAMUSCULAR; INTRAVENOUS at 09:01

## 2017-09-08 RX ADMIN — IPRATROPIUM BROMIDE AND ALBUTEROL SULFATE 3 ML: .5; 3 SOLUTION RESPIRATORY (INHALATION) at 19:16

## 2017-09-08 RX ADMIN — OXYCODONE HYDROCHLORIDE 5 MG: 5 TABLET ORAL at 20:37

## 2017-09-08 RX ADMIN — TAMSULOSIN HYDROCHLORIDE 0.4 MG: 0.4 CAPSULE ORAL at 09:00

## 2017-09-08 RX ADMIN — DABIGATRAN ETEXILATE MESYLATE 75 MG: 75 CAPSULE ORAL at 20:36

## 2017-09-08 RX ADMIN — CYCLOBENZAPRINE HYDROCHLORIDE 5 MG: 10 TABLET, FILM COATED ORAL at 15:55

## 2017-09-08 RX ADMIN — TRAMADOL HYDROCHLORIDE 50 MG: 50 TABLET, COATED ORAL at 02:54

## 2017-09-08 RX ADMIN — MEMANTINE HYDROCHLORIDE 5 MG: 10 TABLET ORAL at 09:01

## 2017-09-08 RX ADMIN — ESCITALOPRAM OXALATE 10 MG: 10 TABLET ORAL at 09:01

## 2017-09-08 RX ADMIN — DABIGATRAN ETEXILATE MESYLATE 75 MG: 75 CAPSULE ORAL at 09:00

## 2017-09-08 ASSESSMENT — PAIN SCALES - GENERAL
PAINLEVEL_OUTOF10: 6
PAINLEVEL_OUTOF10: ASSUMED PAIN PRESENT
PAINLEVEL_OUTOF10: 5
PAINLEVEL_OUTOF10: 8
PAINLEVEL_OUTOF10: 2

## 2017-09-08 ASSESSMENT — ACTIVITIES OF DAILY LIVING (ADL): TOILETING: REQUIRES ASSIST

## 2017-09-08 NOTE — PROGRESS NOTES
0700-Report from ANA Montgoemry. POC reviewed. Pt sleeping at this time. In no apparent distress or discomfort. Will continue to monitor.     0745-Spoke with pt's son. Updated on events from last night. Clarified Full code status. Per son, Fabian, his brother is POA and he would have to further discuss with him.     0800-Assessment complete of patient. Requesting to rest at this time. Does report left sided rib cage pain. Heat pack given. Does not report any chest pain.

## 2017-09-08 NOTE — CARE PLAN
Problem: Oxygenation:  Goal: Maintain adequate oxygenation dependent on patient condition  Outcome: PROGRESSING SLOWER THAN EXPECTED  Pt O2 demand increased. Pt is on 6L NC at this moment. Will continue to monitor levels of oxygenation.    Problem: Hyperinflation:  Goal: Prevent or improve atelectasis  Outcome: PROGRESSING SLOWER THAN EXPECTED  Pt is not able to coordinate the IS. Pt still having left side pain, pt gets tired easily and fall asleep while doing IS. I added PEP therapy to help deep breathing. Will continue to do IS and PEP.

## 2017-09-08 NOTE — PROGRESS NOTES
Patient has been unable to urinate despite having the urge. Patient assisted up to commode but was unsuccessful. Patient having an extremely difficult time articulating needs this AM. Bladder scan performed showed 331. RN spoke with Dr. Barriga, given patient's difficulty and strain related to this, MD gave order to place smith.

## 2017-09-08 NOTE — PROGRESS NOTES
"Patient woke yelling for help. RN into room immediately. Patient clutching at chest saying repeatedly that it was \"hurting\". When asked whether is was his rib cage or another part of his chest patient was unable to answer definitively. STAT EKG and lab work ordered per protocol. Patient medicated for pain per MAR.   "

## 2017-09-08 NOTE — FLOWSHEET NOTE
09/08/17 0937   Events/Summary/Plan   Events/Summary/Plan IS and PEP done   Non-Invasive Resp Device Site Inspection Completed Intact   Interdisciplinary Plan of Care-Goals (Indications)   Hyperinflation Protocol Indications Atelectasis Documented by Chest X-Ray   Interdisciplinary Plan of Care-Outcomes    Hyperinflation Protocol Goals/Outcome Improvement in Repeat CXR;Increased Vital Capacity or Return to Pre-operative Values   Education   Education Yes - Pt. / Family has been Instructed in use of Respiratory Equipment   PEP/CPT Group   PEP/CPT/Airway Clearance Therapy Yes   #PEP/CPT (Manual) Initial Initial   PEP/CPT Method Positive Airway Pressure Device   Date Disposable Equipment Last Changed 09/08/17   Date Disposable Equipment Next Change Due (Q 30 Days) 10/08/17   Incentive Spirometry Group   Incentive Spirometry Instruction Yes   Breathing Exercises Yes   Incentive Spirometer Volume 500 mL   Chest Exam   Pulse 60   Oxygen   Pulse Oximetry 91 %

## 2017-09-08 NOTE — PROGRESS NOTES
[facility] Hospitalist Progress Note    Date of Service: 2017    Chief Complaint  89 y.o. male with PMH of dementia, PAF, CKD III admitted 2017 with SOB    Interval Problem Update  Pleasantly demented. Still having left sided chest pain. Still only able to work on IS<500 cc. Discussed with RN to try to get him work on it as much as we can. Pain medication adjusted. Dr. Espinosa consulted.    Consultants/Specialty  none    Disposition  Remain on the floor        Review of Systems   Unable to perform ROS: Dementia   Cardiovascular: Positive for chest pain.      Physical Exam  Laboratory/Imaging   Hemodynamics  Temp (24hrs), Av.4 °C (97.5 °F), Min:35.9 °C (96.7 °F), Max:36.7 °C (98.1 °F)   Temperature: 36.4 °C (97.6 °F)  Pulse  Av.6  Min: 56  Max: 81    NIBP: 107/62      Respiratory      Respiration: 18, Pulse Oximetry: 92 %, O2 Daily Delivery Respiratory : Silicone Nasal Cannula     Given By:: Mouthpiece, Work Of Breathing / Effort: Mild  RUL Breath Sounds: Crackles;Expiratory Wheezes, RML Breath Sounds: Crackles;Expiratory Wheezes, RLL Breath Sounds: Crackles;Expiratory Wheezes, SARA Breath Sounds: Crackles;Expiratory Wheezes, LLL Breath Sounds: Crackles;Expiratory Wheezes    Fluids    Intake/Output Summary (Last 24 hours) at 17 0754  Last data filed at 17 1900   Gross per 24 hour   Intake              440 ml   Output              475 ml   Net              -35 ml       Nutrition  Orders Placed This Encounter   Procedures   • Diet Order     Standing Status:   Standing     Number of Occurrences:   1     Order Specific Question:   Diet:     Answer:   Cardiac [6]     Physical Exam   Constitutional: No distress.   HENT:   Head: Normocephalic and atraumatic.   Right Ear: External ear normal.   Mouth/Throat: No oropharyngeal exudate.   Eyes: Conjunctivae and EOM are normal. Pupils are equal, round, and reactive to light.   Neck: Normal range of motion. Neck supple. No JVD present. No tracheal deviation  present. No thyromegaly present.   Cardiovascular: Normal rate and regular rhythm.    No murmur heard.  Pulmonary/Chest: Effort normal. No respiratory distress. He has wheezes.   Abdominal: Soft. Bowel sounds are normal. He exhibits no distension. There is no tenderness.   Musculoskeletal: He exhibits no edema or tenderness.   Neurological: He is alert. No cranial nerve deficit.   Skin: Skin is warm and dry. He is not diaphoretic.   Psychiatric:   demented       Recent Labs      09/06/17   0209  09/08/17   0305   WBC  11.0*  12.3*   RBC  3.97*  3.96*   HEMOGLOBIN  13.0*  12.6*   HEMATOCRIT  37.8*  37.0*   MCV  95.2  93.4   MCH  32.7  31.8   MCHC  34.4  34.1   RDW  45.2  44.8   PLATELETCT  163*  163*   MPV  10.8  10.7     Recent Labs      09/06/17   0209  09/07/17   0315  09/08/17   0305   SODIUM  138  135  135   POTASSIUM  4.2  4.1  3.7   CHLORIDE  104  103  100   CO2  23  23  24   GLUCOSE  126*  142*  107*   BUN  42*  53*  54*   CREATININE  1.86*  1.93*  1.91*   CALCIUM  8.6  8.2*  8.1*         Recent Labs      09/08/17   0305   BNPBTYPENAT  187*           CT   1.  No evidence of interstitial lung disease.    2.  Motion degraded study.    3.  Atelectasis versus scarring within the lung bases bilaterally.    4.  Atherosclerotic vascular calcification.    5.  Eventration of the right hemidiaphragm.   Assessment/Plan     Acute respiratory failure with hypoxia (CMS-[facility])   Assessment & Plan    Likely related to atelectasis.  Patient had fall recently and having lots of chest pain and not able to take deep breath  History of pulmonary fibrosis from previous CT  BNP elevated but improving  On  Lasix 20 mg once a day  Aggressive breathing treatment with duoneb  HRCT: no acute issue  Echocardiogram: LVEF: 70%, RVSP: 45-50 mmHg  Continue above treatment, titrate o2 as needed  IS: 10 time per hour  Consulted Dr. Espinosa        Lactic acidosis   Assessment & Plan    Resolved with IV fluid        Chronic kidney disease, stage  III (moderate)   Assessment & Plan    Cr: stabalized  Avoid nephrotoxic medication  Follow CMP in the morning  on lasix to 20 mg once a day        Thrombocytopenia (CMS-HCC)- (present on admission)   Assessment & Plan    Follow CBC in a.m.        Dementia- (present on admission)   Assessment & Plan    Continue me mementine        Paroxysmal atrial fibrillation (CMS-HCC)- (present on admission)   Assessment & Plan    Rate control  On digoxin and pradaxa            Reviewed items::  Labs reviewed, Medications reviewed and Radiology images reviewed  DVT: pradaxa.

## 2017-09-08 NOTE — THERAPY
"Occupational Therapy Evaluation completed.   Functional Status:  A&Ox2. Pleasantly confused. Follows 1-step commands. [place] and O2 NC in place. C/o L rib pain. Limited L shoulder AROM; prior injury. Sup to sit with ModA, scooting with Leroy, STS with CGA, FWW. Tolerates EOB 4\", standing 5\". ADL transfer to chair with CGA, v/c's. Feeding with set-up and Sup. Seated grooming with v/c's, Leroy.      Plan of Care: Will benefit from Occupational Therapy 3 times per week  Discharge Recommendations:  Equipment: Will Continue to Assess for Equipment Needs. Post-acute therapy Discharge to a transitional care facility for continued skilled therapy services.  Lives at [facility].   See \"Rehab Therapy-Acute\" Patient Summary Report for complete documentation.    "

## 2017-09-08 NOTE — DISCHARGE PLANNING
SW called and left voicemail for pt's son, Fabian [phone] requesting a call back about d.c planning.  SW has SNF order and needs to obtain choice.

## 2017-09-08 NOTE — FLOWSHEET NOTE
09/07/17 1947   Events/Summary/Plan   Events/Summary/Plan SVN IS   Interdisciplinary Plan of Care-Goals (Indications)   Obstructive Ventilatory Defect or Pulmonary Disease without Obvious Obstruction History / Diagnosis   Interdisciplinary Plan of Care-Outcomes    Bronchodilator Outcome Improved Vital Signs and Measures of Gas Exchange;Diminished Wheezing and Volume of Air Movement Increased   Education   Education Yes - Pt. / Family has been Instructed in use of Respiratory Medications and Adverse Reactions;Yes - Pt. / Family has been Instructed in use of Respiratory Equipment   RT Assessment of Delivered Medications   Evaluation of Medication Delivery Daily Yes-- Pt /Family has been Instructed in use of Respiratory Medications and Adverse Reactions   SVN Group   #SVN Performed Yes   Given By: Mouthpiece   Incentive Spirometry Group   Incentive Spirometry Instruction Yes   Breathing Exercises Yes   Incentive Spirometer Volume 500 mL   Respiratory WDL   Respiratory (WDL) X   Chest Exam   Respiration 18   Pulse 61   Breath Sounds   Pre/Post Intervention Post Intervention Assessment   RUL Breath Sounds Crackles   RML Breath Sounds Crackles;Expiratory Wheezes   RLL Breath Sounds Diminished;Crackles   SARA Breath Sounds Crackles;Expiratory Wheezes   LLL Breath Sounds Diminished   Oxygen   Pulse Oximetry 92 %   O2 (LPM) 4   O2 Daily Delivery Respiratory  Silicone Nasal Cannula

## 2017-09-08 NOTE — DISCHARGE PLANNING
KLARISSA Contreras received a call from pt's son, Fabian who chose [facility].  KLARISSA faxed choice form to PRUDENCIO Stubbs for referral.

## 2017-09-08 NOTE — DISCHARGE PLANNING
Received choice form from [facility] at [phone],  Referral sent to [facility] at 1100 on 09-08-17.

## 2017-09-08 NOTE — FLOWSHEET NOTE
09/08/17 1508   Events/Summary/Plan   Events/Summary/Plan IS and PEP done   Non-Invasive Resp Device Site Inspection Completed Intact   Interdisciplinary Plan of Care-Goals (Indications)   Hyperinflation Protocol Indications Atelectasis Documented by Chest X-Ray   Interdisciplinary Plan of Care-Outcomes    Hyperinflation Protocol Goals/Outcome Increased Vital Capacity or Return to Pre-operative Values;Improvement in Repeat CXR   Education   Education Yes - Pt. / Family has been Instructed in use of Respiratory Equipment   Incentive Spirometry Group   Incentive Spirometry Instruction Yes   Breathing Exercises Yes   Incentive Spirometer Volume 250 mL  (pt cannot coordinate and seems more tired. )   Respiratory WDL   Respiratory (WDL) X   Chest Exam   Work Of Breathing / Effort Mild   Respiration 18   Pulse 65   Breath Sounds   Pre/Post Intervention Post Intervention Assessment   RUL Breath Sounds Expiratory Wheezes;Crackles   RML Breath Sounds Expiratory Wheezes;Crackles   RLL Breath Sounds Expiratory Wheezes   SARA Breath Sounds Expiratory Wheezes;Crackles   LLL Breath Sounds Expiratory Wheezes   Oximetry   Continuous Oximetry Yes   Oxygen   Pulse Oximetry 93 %   O2 (LPM) 6   O2 Daily Delivery Respiratory  Silicone Nasal Cannula

## 2017-09-08 NOTE — CARE PLAN
Problem: Safety  Goal: Will remain free from injury  Patient educated regarding fall precautions and verbalized understanding. Non slip socks on patient. Bed alarm is on. Patient verbalized understanding regarding use of call light for assistance. Mobility assessed and proper sign placed on door.     Problem: Knowledge Deficit  Goal: Knowledge of disease process/condition, treatment plan, diagnostic tests, and medications will improve  POC discussed with patient. All questions answered. Patient verbalized understanding.

## 2017-09-08 NOTE — PROGRESS NOTES
Bedside report received. Assumed care of patient. Second RN ambulated patient around room and back to bed, patient tolerated well. Patient is now resting in bed. Patient requesting pain medication with evening medications, RN will administer. Patient is currently oriented to self, place and event. All needs are currently met. All safety precautions in place. Will continue to monitor.

## 2017-09-09 LAB
BACTERIA BLD CULT: NORMAL
BACTERIA BLD CULT: NORMAL
SIGNIFICANT IND 70042: NORMAL
SIGNIFICANT IND 70042: NORMAL
SITE SITE: NORMAL
SITE SITE: NORMAL
SOURCE SOURCE: NORMAL
SOURCE SOURCE: NORMAL

## 2017-09-09 PROCEDURE — 700102 HCHG RX REV CODE 250 W/ 637 OVERRIDE(OP): Performed by: FAMILY MEDICINE

## 2017-09-09 PROCEDURE — 770020 HCHG ROOM/CARE - TELE (206)

## 2017-09-09 PROCEDURE — A9270 NON-COVERED ITEM OR SERVICE: HCPCS | Performed by: FAMILY MEDICINE

## 2017-09-09 PROCEDURE — A9270 NON-COVERED ITEM OR SERVICE: HCPCS | Performed by: HOSPITALIST

## 2017-09-09 PROCEDURE — 700102 HCHG RX REV CODE 250 W/ 637 OVERRIDE(OP): Performed by: INTERNAL MEDICINE

## 2017-09-09 PROCEDURE — 700102 HCHG RX REV CODE 250 W/ 637 OVERRIDE(OP): Performed by: HOSPITALIST

## 2017-09-09 PROCEDURE — A9270 NON-COVERED ITEM OR SERVICE: HCPCS | Performed by: INTERNAL MEDICINE

## 2017-09-09 PROCEDURE — 99232 SBSQ HOSP IP/OBS MODERATE 35: CPT | Performed by: INTERNAL MEDICINE

## 2017-09-09 PROCEDURE — 94668 MNPJ CHEST WALL SBSQ: CPT

## 2017-09-09 PROCEDURE — 94760 N-INVAS EAR/PLS OXIMETRY 1: CPT

## 2017-09-09 RX ADMIN — TAMSULOSIN HYDROCHLORIDE 0.4 MG: 0.4 CAPSULE ORAL at 08:15

## 2017-09-09 RX ADMIN — OXYCODONE HYDROCHLORIDE 5 MG: 5 TABLET ORAL at 16:14

## 2017-09-09 RX ADMIN — CYCLOBENZAPRINE HYDROCHLORIDE 5 MG: 10 TABLET, FILM COATED ORAL at 01:05

## 2017-09-09 RX ADMIN — FUROSEMIDE 20 MG: 20 TABLET ORAL at 08:15

## 2017-09-09 RX ADMIN — DABIGATRAN ETEXILATE MESYLATE 75 MG: 75 CAPSULE ORAL at 08:15

## 2017-09-09 RX ADMIN — OXYCODONE HYDROCHLORIDE 5 MG: 5 TABLET ORAL at 08:15

## 2017-09-09 RX ADMIN — ACETAMINOPHEN 650 MG: 325 TABLET, FILM COATED ORAL at 20:44

## 2017-09-09 RX ADMIN — MEMANTINE HYDROCHLORIDE 5 MG: 10 TABLET ORAL at 08:15

## 2017-09-09 RX ADMIN — ESCITALOPRAM OXALATE 10 MG: 10 TABLET ORAL at 08:15

## 2017-09-09 RX ADMIN — DOCUSATE SODIUM AND SENNOSIDES 2 TABLET: 8.6; 5 TABLET, FILM COATED ORAL at 08:15

## 2017-09-09 RX ADMIN — OMEPRAZOLE 20 MG: 20 CAPSULE, DELAYED RELEASE ORAL at 08:15

## 2017-09-09 RX ADMIN — DOCUSATE SODIUM AND SENNOSIDES 2 TABLET: 8.6; 5 TABLET, FILM COATED ORAL at 20:44

## 2017-09-09 RX ADMIN — ACETAMINOPHEN 650 MG: 325 TABLET, FILM COATED ORAL at 11:01

## 2017-09-09 RX ADMIN — DABIGATRAN ETEXILATE MESYLATE 75 MG: 75 CAPSULE ORAL at 20:44

## 2017-09-09 ASSESSMENT — PAIN SCALES - GENERAL
PAINLEVEL_OUTOF10: 7
PAINLEVEL_OUTOF10: ASSUMED PAIN PRESENT
PAINLEVEL_OUTOF10: 6
PAINLEVEL_OUTOF10: 0
PAINLEVEL_OUTOF10: 7
PAINLEVEL_OUTOF10: 6

## 2017-09-09 NOTE — PROGRESS NOTES
1915-Report to ANA Carmona. POC reviewed. Pt continues to sit up in cardiac chair with call light within reach.

## 2017-09-09 NOTE — CARE PLAN
Problem: Oxygenation:  Goal: Maintain adequate oxygenation dependent on patient condition  Outcome: PROGRESSING SLOWER THAN EXPECTED  Patient still on 6 LPM nasal cannula for Sp02 >90%.  He does not use supplemental oxygen @ home    Problem: Hyperinflation:  Goal: Prevent or improve atelectasis  Outcome: PROGRESSING SLOWER THAN EXPECTED  Patient c/o left sided pain with deep breaths, consequently reaching 250 ml on IS  Intervention: Instruct incentive spirometry usage  Good effort though patient does have some pain with deep breathing  Intervention: Perform hyperinflation therapy as indicated by assessment  Patient using positive airway pressure device (theraPEP) with greater ease than IS

## 2017-09-09 NOTE — PROGRESS NOTES
1500-Pt assisted to cardiac chair with OT. Appeared to tolerate well. Encouraged to use IS. Pt unable to take deep breaths with IS even with explanation and encouragement from RN/RT.     1600-Pt c/o left side rib cage/chest pain. Pt medicated per mar and given heat pack. Will continue to monitor.

## 2017-09-09 NOTE — CONSULTS
"DATE OF SERVICE:  09/09/2017    REQUESTING PHYSICIAN:  Aditya Cavanaugh MD    CONSULTING PHYSICIAN:  Varghese Santiago MD    REASON FOR CONSULTATION:  Evaluation and management of respiratory   insufficiency.    HISTORY OF PRESENT ILLNESS:  Patient is a 89-year-old who tells me he hails   from [place].  He is a full code.  The history was limited as the patient   tells me that he \"do not know much of what I am doing.\"    The patient evidently had a ground level fall who has had increasing shortness   of breath recently who was admitted by Dr. Colin on or about September 4th   with acute hypoxemic respiratory failure, possible heart failure, atrial   fibrillation, chronic anticoagulation, left elbow laceration, history of   urinary retention, history of dementia, and acute on chronic renal   insufficiency.    Patient is a full code and has been requiring 5-6 liters of oxygen a minute in   order to sustain a satisfactory level of saturation.  He has been encouraged   to use IS, but has difficulty doing this because of his dementia.    The patient tells me that he was a previous smoker.  He denies any known   history of lung disease.  He tells me that he used to work as a  and   was in [place].  According to _____ he lives in a memory care unit.    PAST SURGICAL HISTORY:  Cholecystectomy, lumbar laminectomy, shoulder   arthroscopy.    SOCIAL HISTORY:  No current history of alcohol or drugs.    MEDICATIONS:  Prior to admission include Norvasc 5 mg a day, Lexapro 10 mg a   day, Prilosec 20 mg a day, Flomax 0.4 mg a day, Cozaar 25 mg daily, Namenda 5   mg daily, Pradaxa 75 mg twice a day, Colace 100 mg twice a day, and vitamin C   500 mg at bedtime.    ALLERGIES:  INCLUDE PENICILLINS AND BETA BLOCKERS.    MEDICAL PROBLEMS:  Include history of congestive heart failure, atrial   fibrillation, chronic anticoagulation, urinary retention, dementia, and acute   on chronic renal insufficiency.    FAMILY HISTORY:  " Nothing available from the patient.  The chart indicates that   there were stomach issues.    REVIEW OF SYSTEMS:  Not possible given the patient's memory issues.    PHYSICAL EXAMINATION:  GENERAL:  Currently on examination, the patient appears comfortable.  VITAL SIGNS:  He has a heart rate of 63, he has a temperature of 36.2,   respiratory rate 17, saturation 97% on 5 L of oxygen a minute, blood pressure   100/62.  HEENT:  Head, normocephalic and atraumatic.  He has bilateral arcus senilis.    He has no evidence of any head trauma.  He is using nasal prongs and appears   slightly short of breath, especially when he moves and has left-sided chest   pain.  NECK:  Adenopathy not appreciated.  LUNGS:  Revealed diminished breath sounds.  He has tenderness across the left   anterior chest wall.  CARDIAC:  Reveals ventricular paced complexes.  ABDOMEN:  Soft, nontender.  EXTREMITIES:  Trace peripheral edema.    LABORATORY DATA:  White count 12,300, hemoglobin 12.6, hematocrit 37.0.    Electrolytes unremarkable.  BUN 54, creatinine 1.91, glucose is 107, GFR is 33   mL per minute per 1.73 m2.  B-natriuretic peptide 187.  Troponin I 0.2,   normal.  Last INR 1.4.  UA revealed proteinuria.  He had an arterial blood gas   at the time of admission showing pO2 70, pCO2 of 34, pH of 7.41.    Procalcitonin yesterday 0.07, which is normal.  Blood cultures, urine cultures   negative.  CT of the chest high resolution performed on September 5th showed   no evidence of ILD, motion-degraded study, atelectasis versus scarring within   the lung bases bilaterally, atherosclerotic vascular calcification, and   eventration of the right diaphragm.  His regular x-rays have shown similar   abnormalities.  Echocardiogram showed mild pulmonary hypertension with an RVSP   that is of 45-50, which actually be moderate mitral regurgitation.  Patient   had an EF of 70% and grossly normal regional wall motion.    ASSESSMENT:  1.  Dyspnea.  2.   Respiratory failure.  3.  Status post ground level fall with left-sided chest contusion and   bibasilar atelectasis.  4.  Full code.  5.  History of left-sided pacemaker.  6.  Mild anemia.  7.  Chronic kidney disease.  8.  History of multiple surgeries as noted above.  9.  Dementia.  10.  Hypertension.  11.  Chronic anticoagulation for atrial fibrillation.  12.  History of cholecystectomy, lumbar laminectomy, and shoulder arthroscopy.  13.  Critically ill, [id number].    Time spent 45 minutes.    PLAN:  The patient is receiving appropriate management of his atelectasis and   respiratory failure.  He should continue on O2 and RT by protocols and   incentive spirometry should be strongly recommended; however, with the   patient's dementia, he has great difficulty using the incentive spirometry.    Hopefully, tincture of time will lead to improvement in his pain management   and left main of his atelectasis.  He is at the present time a full code.    Therefore, should he worsen, he would require intubation and mechanical   ventilation.  Given his age and dementia, maybe worthwhile to reconsider this   decision.    Thank you very much for asking us to participate in the care of this very   challenging patient.       ____________________________________     MD ROLY VALENTINE / ESTEPHANIA    DD:  09/09/2017 11:47:08  DT:  09/09/2017 12:25:40    D#:  [id number]  Job#:  [id number]    cc: VANCE PUGA MD

## 2017-09-09 NOTE — PROGRESS NOTES
Pt medicated per MAR PRN flexeril for pain to L chest wall. Remains resting comfortably at this time, wishes to sleep in recliner tonight. Provided with heat pack for pain. States no further needs at this time. Will continue with care.

## 2017-09-09 NOTE — PROGRESS NOTES
Report received from ANA Rios. Pt resting comfortably in chair and in no apparent distress. Pt instructed to call RN with any and all needs and agrees. Will continue with care.

## 2017-09-09 NOTE — FLOWSHEET NOTE
This note also relates to the following rows which could not be included:  Pulse - Cannot attach notes to unvalidated device data  Pulse Oximetry - Cannot attach notes to unvalidated device data       09/08/17 1900   Interdisciplinary Plan of Care-Goals (Indications)   Obstructive Ventilatory Defect or Pulmonary Disease without Obvious Obstruction Physical Exam / Hyperinflation / Wheezing (bronchospasm)   Hyperinflation Protocol Indications Atelectasis Documented by Chest X-Ray   Interdisciplinary Plan of Care-Outcomes    Bronchodilator Outcome Diminished Wheezing and Volume of Air Movement Increased   Hyperinflation Protocol Goals/Outcome Increased Vital Capacity or Return to Pre-operative Values   Education   Education Yes - Pt. / Family has been Instructed in use of Respiratory Equipment   RT Assessment of Delivered Medications   Evaluation of Medication Delivery Daily Yes-- Pt /Family has been Instructed in use of Respiratory Medications and Adverse Reactions   SVN Group   #SVN Performed Yes   Given By: Mouthpiece   Date SVN Last Changed 09/07/17   Date SVN Next Change Due (Q 7 Days) 09/14/17   PEP/CPT Group   PEP/CPT/Airway Clearance Therapy Yes   #PEP/CPT (Manual) Subsequent Subsequent   PEP/CPT Method Positive Airway Pressure Device   Date Disposable Equipment Last Changed 09/08/17   Date Disposable Equipment Next Change Due (Q 30 Days) 10/08/17   Incentive Spirometry Group   Incentive Spirometry Instruction Yes   Breathing Exercises Yes   Incentive Spirometer Volume 250 mL   Chest Exam   Work Of Breathing / Effort Shallow   Respiration (!) 22   Breath Sounds   RUL Breath Sounds Expiratory Wheezes   RML Breath Sounds Expiratory Wheezes   RLL Breath Sounds Fine Crackles   SARA Breath Sounds Expiratory Wheezes   LLL Breath Sounds Fine Crackles   Secretions   Cough Congested   How Sputum Obtained Spontaneous   Oximetry   #Pulse Oximetry (Single Determination) Yes   Continuous Oximetry Yes   Oxygen   Home O2 Use  Prior To Admission? No   O2 (LPM) 6   O2 Daily Delivery Respiratory  Silicone Nasal Cannula

## 2017-09-09 NOTE — PROGRESS NOTES
0700-Report from ANA Carmona. POC reviewed. Pt up in cardiac chair, sleeping at this time. Will continue to monitor.     0815-AM meds given. Pt tolerated well. Assisted from cardiac chair to bed with x1 assist and walker. Pt did c/o pain to left side rib cage and chest. Positioned in bed for comfort and heat pack provided. Call light with in reach.

## 2017-09-09 NOTE — PROGRESS NOTES
[facility] Hospitalist Progress Note    Date of Service: 2017    Chief Complaint  89 y.o. male with PMH of dementia, PAF, CKD III admitted 2017 with SOB    Interval Problem Update  Chest pain is improving. But still having difficulty with IS. Awaiting to be seen by Dr. Espinosa.    Consultants/Specialty  none    Disposition  Remain on the floor        Review of Systems   Unable to perform ROS: Dementia   Cardiovascular: Positive for chest pain.      Physical Exam  Laboratory/Imaging   Hemodynamics  Temp (24hrs), Av.1 °C (97 °F), Min:35.9 °C (96.6 °F), Max:36.3 °C (97.4 °F)   Temperature: 36.1 °C (97 °F)  Pulse  Av.1  Min: 56  Max: 81    NIBP: 106/61      Respiratory      Respiration: 18, Pulse Oximetry: 96 %, O2 Daily Delivery Respiratory : Silicone Nasal Cannula     Given By:: Mouthpiece, PEP/CPT Method: Positive Airway Pressure Device, Work Of Breathing / Effort: Shallow  RUL Breath Sounds: Crackles, RML Breath Sounds: Crackles, RLL Breath Sounds: Diminished, SARA Breath Sounds: Crackles, LLL Breath Sounds: Diminished    Fluids    Intake/Output Summary (Last 24 hours) at 17 0722  Last data filed at 17 0600   Gross per 24 hour   Intake              500 ml   Output             1075 ml   Net             -575 ml       Nutrition  Orders Placed This Encounter   Procedures   • Diet Order     Standing Status:   Standing     Number of Occurrences:   1     Order Specific Question:   Diet:     Answer:   Cardiac [6]     Physical Exam   Constitutional: No distress.   HENT:   Head: Normocephalic and atraumatic.   Mouth/Throat: No oropharyngeal exudate.   Eyes: EOM are normal. Pupils are equal, round, and reactive to light.   Neck: Normal range of motion. Neck supple. No tracheal deviation present. No thyromegaly present.   Cardiovascular: Normal rate and regular rhythm.    No murmur heard.  Pulmonary/Chest: Effort normal. No respiratory distress. He has wheezes.   Abdominal: Soft. Bowel sounds are normal.  He exhibits no distension.   Musculoskeletal: He exhibits no edema or tenderness.   Neurological: He is alert. No cranial nerve deficit.   Skin: Skin is warm and dry. He is not diaphoretic.   Psychiatric:   demented       Recent Labs      09/08/17   0305   WBC  12.3*   RBC  3.96*   HEMOGLOBIN  12.6*   HEMATOCRIT  37.0*   MCV  93.4   MCH  31.8   MCHC  34.1   RDW  44.8   PLATELETCT  163*   MPV  10.7     Recent Labs      09/07/17   0315  09/08/17   0305   SODIUM  135  135   POTASSIUM  4.1  3.7   CHLORIDE  103  100   CO2  23  24   GLUCOSE  142*  107*   BUN  53*  54*   CREATININE  1.93*  1.91*   CALCIUM  8.2*  8.1*         Recent Labs      09/08/17   0305   BNPBTYPENAT  187*           CT   1.  No evidence of interstitial lung disease.    2.  Motion degraded study.    3.  Atelectasis versus scarring within the lung bases bilaterally.    4.  Atherosclerotic vascular calcification.    5.  Eventration of the right hemidiaphragm.   Assessment/Plan     Acute respiratory failure with hypoxia (CMS-HCC)   Assessment & Plan    Likely related to atelectasis.  Worsening on 5-6L of o2  Patient had fall recently and having lots of chest pain and not able to take deep breath  History of pulmonary fibrosis from previous CT  BNP elevated but improving  On PO Lasix 20 mg once a day  Aggressive breathing treatment with duoneb, IS  HRCT: no acute issue  Echocardiogram: LVEF: 70%, RVSP: 45-50 mmHg  Continue above treatment, titrate o2 as needed  IS: 10 time per hour  Consulted Dr. Espinosa        Lactic acidosis   Assessment & Plan    Resolved with IV fluid        Chronic kidney disease, stage III (moderate)   Assessment & Plan    Cr: stabalized  Avoid nephrotoxic medication  Follow CMP in the morning  on lasix to 20 mg once a day        Thrombocytopenia (CMS-HCC)- (present on admission)   Assessment & Plan    Follow CBC in a.m.        Dementia- (present on admission)   Assessment & Plan    Continue me mementine        Paroxysmal atrial  fibrillation (CMS-HCC)- (present on admission)   Assessment & Plan    Rate control  On digoxin and pradaxa            Reviewed items::  Labs reviewed, Medications reviewed and Radiology images reviewed  DVT: pradaxa.

## 2017-09-09 NOTE — CARE PLAN
Problem: Skin Integrity  Goal: Risk for impaired skin integrity will decrease  Outcome: PROGRESSING AS EXPECTED  Pt encouraged to mobilize as tolerated and shift weight from areas of high pressure to prevent skin breakdown

## 2017-09-09 NOTE — DISCHARGE PLANNING
Medical Social Work    Referral: Pt discussed at IDT rounds this AM.    Intervention: Pt lives at [facility].  Referral sent to [facility].    Plan: KLARISSA Contreras called Wendy at [facility] and faxed therapy notes.  [facility] will accept pt when medically cleared which could be tomorrow.

## 2017-09-10 ENCOUNTER — APPOINTMENT (OUTPATIENT)
Dept: RADIOLOGY | Facility: MEDICAL CENTER | Age: 82
DRG: 291 | End: 2017-09-10
Attending: INTERNAL MEDICINE
Payer: MEDICARE

## 2017-09-10 VITALS
OXYGEN SATURATION: 94 % | BODY MASS INDEX: 22.66 KG/M2 | SYSTOLIC BLOOD PRESSURE: 127 MMHG | TEMPERATURE: 96.7 F | DIASTOLIC BLOOD PRESSURE: 70 MMHG | WEIGHT: 144.4 LBS | HEIGHT: 67 IN | HEART RATE: 67 BPM | RESPIRATION RATE: 16 BRPM

## 2017-09-10 LAB
ANION GAP SERPL CALC-SCNC: 4 MMOL/L (ref 0–11.9)
BUN SERPL-MCNC: 47 MG/DL (ref 8–22)
CALCIUM SERPL-MCNC: 7.9 MG/DL (ref 8.4–10.2)
CHLORIDE SERPL-SCNC: 101 MMOL/L (ref 96–112)
CO2 SERPL-SCNC: 28 MMOL/L (ref 20–33)
CREAT SERPL-MCNC: 1.83 MG/DL (ref 0.5–1.4)
ERYTHROCYTE [DISTWIDTH] IN BLOOD BY AUTOMATED COUNT: 44.1 FL (ref 35.9–50)
GFR SERPL CREATININE-BSD FRML MDRD: 35 ML/MIN/1.73 M 2
GLUCOSE SERPL-MCNC: 131 MG/DL (ref 65–99)
HCT VFR BLD AUTO: 38.4 % (ref 42–52)
HGB BLD-MCNC: 13.2 G/DL (ref 14–18)
MCH RBC QN AUTO: 32.3 PG (ref 27–33)
MCHC RBC AUTO-ENTMCNC: 34.4 G/DL (ref 33.7–35.3)
MCV RBC AUTO: 93.9 FL (ref 81.4–97.8)
PLATELET # BLD AUTO: 185 K/UL (ref 164–446)
PMV BLD AUTO: 10.5 FL (ref 9–12.9)
POTASSIUM SERPL-SCNC: 3.6 MMOL/L (ref 3.6–5.5)
RBC # BLD AUTO: 4.09 M/UL (ref 4.7–6.1)
SODIUM SERPL-SCNC: 133 MMOL/L (ref 135–145)
WBC # BLD AUTO: 10.9 K/UL (ref 4.8–10.8)

## 2017-09-10 PROCEDURE — 700102 HCHG RX REV CODE 250 W/ 637 OVERRIDE(OP): Performed by: FAMILY MEDICINE

## 2017-09-10 PROCEDURE — 94668 MNPJ CHEST WALL SBSQ: CPT

## 2017-09-10 PROCEDURE — 700102 HCHG RX REV CODE 250 W/ 637 OVERRIDE(OP): Performed by: INTERNAL MEDICINE

## 2017-09-10 PROCEDURE — A9270 NON-COVERED ITEM OR SERVICE: HCPCS | Performed by: HOSPITALIST

## 2017-09-10 PROCEDURE — A9270 NON-COVERED ITEM OR SERVICE: HCPCS | Performed by: INTERNAL MEDICINE

## 2017-09-10 PROCEDURE — A9270 NON-COVERED ITEM OR SERVICE: HCPCS | Performed by: FAMILY MEDICINE

## 2017-09-10 PROCEDURE — 71010 DX-CHEST-PORTABLE (1 VIEW): CPT

## 2017-09-10 PROCEDURE — 99239 HOSP IP/OBS DSCHRG MGMT >30: CPT | Performed by: INTERNAL MEDICINE

## 2017-09-10 PROCEDURE — 700102 HCHG RX REV CODE 250 W/ 637 OVERRIDE(OP): Performed by: HOSPITALIST

## 2017-09-10 PROCEDURE — 85027 COMPLETE CBC AUTOMATED: CPT

## 2017-09-10 PROCEDURE — A9270 NON-COVERED ITEM OR SERVICE: HCPCS

## 2017-09-10 PROCEDURE — 94760 N-INVAS EAR/PLS OXIMETRY 1: CPT

## 2017-09-10 PROCEDURE — 80048 BASIC METABOLIC PNL TOTAL CA: CPT

## 2017-09-10 PROCEDURE — 700102 HCHG RX REV CODE 250 W/ 637 OVERRIDE(OP)

## 2017-09-10 RX ORDER — OXYCODONE HYDROCHLORIDE 5 MG/1
5 TABLET ORAL EVERY 8 HOURS PRN
Qty: 20 TAB | Refills: 0
Start: 2017-09-10 | End: 2018-02-09

## 2017-09-10 RX ADMIN — ESCITALOPRAM OXALATE 10 MG: 10 TABLET ORAL at 08:47

## 2017-09-10 RX ADMIN — DABIGATRAN ETEXILATE MESYLATE 75 MG: 75 CAPSULE ORAL at 08:50

## 2017-09-10 RX ADMIN — OXYCODONE HYDROCHLORIDE 5 MG: 5 TABLET ORAL at 10:32

## 2017-09-10 RX ADMIN — TAMSULOSIN HYDROCHLORIDE 0.4 MG: 0.4 CAPSULE ORAL at 08:49

## 2017-09-10 RX ADMIN — DOCUSATE SODIUM AND SENNOSIDES 2 TABLET: 8.6; 5 TABLET, FILM COATED ORAL at 08:48

## 2017-09-10 RX ADMIN — FUROSEMIDE 20 MG: 20 TABLET ORAL at 08:48

## 2017-09-10 RX ADMIN — OMEPRAZOLE 20 MG: 20 CAPSULE, DELAYED RELEASE ORAL at 08:47

## 2017-09-10 RX ADMIN — MEMANTINE HYDROCHLORIDE 5 MG: 10 TABLET ORAL at 08:48

## 2017-09-10 RX ADMIN — OXYCODONE HYDROCHLORIDE 5 MG: 5 TABLET ORAL at 01:03

## 2017-09-10 RX ADMIN — ACETAMINOPHEN 650 MG: 325 TABLET, FILM COATED ORAL at 06:34

## 2017-09-10 ASSESSMENT — PATIENT HEALTH QUESTIONNAIRE - PHQ9
1. LITTLE INTEREST OR PLEASURE IN DOING THINGS: NOT AT ALL
2. FEELING DOWN, DEPRESSED, IRRITABLE, OR HOPELESS: NOT AT ALL
SUM OF ALL RESPONSES TO PHQ9 QUESTIONS 1 AND 2: 0
SUM OF ALL RESPONSES TO PHQ QUESTIONS 1-9: 0

## 2017-09-10 ASSESSMENT — PAIN SCALES - GENERAL
PAINLEVEL_OUTOF10: 2
PAINLEVEL_OUTOF10: 2
PAINLEVEL_OUTOF10: 5
PAINLEVEL_OUTOF10: 2
PAINLEVEL_OUTOF10: 7
PAINLEVEL_OUTOF10: 6

## 2017-09-10 NOTE — FLOWSHEET NOTE
09/10/17 1110   Interdisciplinary Plan of Care-Goals (Indications)   Hyperinflation Protocol Indications Atelectasis Documented by Chest X-Ray   Interdisciplinary Plan of Care-Outcomes    Hyperinflation Protocol Goals/Outcome Improvement in Repeat CXR;Greater Than 60% of Predicted I.S. Volume x 24 hrs   PEP/CPT Group   PEP/CPT/Airway Clearance Therapy Yes   #PEP/CPT (Manual) Subsequent Subsequent   PEP/CPT Method Positive Airway Pressure Device   Incentive Spirometry Group   Incentive Spirometry Instruction Yes   Breathing Exercises Yes   Incentive Spirometer Volume 500 mL   Chest Exam   Respiration 16   Pulse 67   Breath Sounds   RUL Breath Sounds Crackles   RML Breath Sounds Diminished   RLL Breath Sounds Diminished   SARA Breath Sounds Crackles   LLL Breath Sounds Diminished   Oximetry   #Pulse Oximetry (Single Determination) Yes   Oxygen   Pulse Oximetry 94 %   O2 (LPM) 1.5   O2 Daily Delivery Respiratory  Silicone Nasal Cannula

## 2017-09-10 NOTE — FLOWSHEET NOTE
09/10/17 0745   Interdisciplinary Plan of Care-Goals (Indications)   Hyperinflation Protocol Indications Atelectasis Documented by Chest X-Ray   Interdisciplinary Plan of Care-Outcomes    Hyperinflation Protocol Goals/Outcome Greater Than 60% of Predicted I.S. Volume x 24 hrs   PEP/CPT Group   PEP/CPT/Airway Clearance Therapy Yes   #PEP/CPT (Manual) Subsequent Subsequent   PEP/CPT Method Positive Airway Pressure Device   Date Disposable Equipment Last Changed 09/08/17   Date Disposable Equipment Next Change Due (Q 30 Days) 10/08/17   Incentive Spirometry Group   Incentive Spirometry Instruction Yes   Breathing Exercises Yes   Incentive Spirometer Volume 500 mL   Chest Exam   Respiration 16   Pulse 61   Breath Sounds   Pre/Post Intervention Post Intervention Assessment   RUL Breath Sounds Clear   RML Breath Sounds Clear;Diminished   RLL Breath Sounds Diminished   SARA Breath Sounds Fine Crackles   LLL Breath Sounds Diminished   Oximetry   #Pulse Oximetry (Single Determination) Yes   Oxygen   Home O2 Use Prior To Admission? No   Pulse Oximetry 98 %   O2 (LPM) 3.5  (titrated to 1.5 lpm.)   O2 Daily Delivery Respiratory  Silicone Nasal Cannula

## 2017-09-10 NOTE — PROGRESS NOTES
[facility] Hospitalist Progress Note    Date of Service: 9/10/2017    Chief Complaint  89 y.o. male with PMH of dementia, PAF, CKD III admitted 2017 with SOB    Interval Problem Update  Chest pain is improving. But still having difficulty with IS. Awaiting to be seen by Dr. Espinosa.    Consultants/Specialty  none    Disposition  Remain on the floor        Review of Systems   Unable to perform ROS: Dementia   Cardiovascular: Positive for chest pain.      Physical Exam  Laboratory/Imaging   Hemodynamics  Temp (24hrs), Av.2 °C (97.2 °F), Min:35.9 °C (96.7 °F), Max:36.6 °C (97.9 °F)   Temperature: 36.6 °C (97.9 °F)  Pulse  Av.8  Min: 56  Max: 81    NIBP: 108/67      Respiratory      Respiration: (!) 22, Pulse Oximetry: 94 %, O2 Daily Delivery Respiratory : Silicone Nasal Cannula     PEP/CPT Method: Positive Airway Pressure Device, Work Of Breathing / Effort: Shallow  RUL Breath Sounds: Rhonchi, RML Breath Sounds: Rhonchi, RLL Breath Sounds: Diminished, SARA Breath Sounds: Rhonchi, LLL Breath Sounds: Diminished    Fluids    Intake/Output Summary (Last 24 hours) at 09/10/17 0745  Last data filed at 09/10/17 0600   Gross per 24 hour   Intake              490 ml   Output             1050 ml   Net             -560 ml       Nutrition  Orders Placed This Encounter   Procedures   • Diet Order     Standing Status:   Standing     Number of Occurrences:   1     Order Specific Question:   Diet:     Answer:   Cardiac [6]     Physical Exam   Constitutional: No distress.   HENT:   Head: Normocephalic and atraumatic.   Mouth/Throat: No oropharyngeal exudate.   Eyes: EOM are normal. Pupils are equal, round, and reactive to light.   Neck: Normal range of motion. Neck supple. No tracheal deviation present. No thyromegaly present.   Cardiovascular: Normal rate and regular rhythm.    No murmur heard.  Pulmonary/Chest: Effort normal. No respiratory distress. He has wheezes.   Abdominal: Soft. Bowel sounds are normal. He exhibits no  distension.   Musculoskeletal: He exhibits no edema or tenderness.   Neurological: He is alert. No cranial nerve deficit.   Skin: Skin is warm and dry. He is not diaphoretic.   Psychiatric:   demented       Recent Labs      09/08/17 0305  09/10/17   0350   WBC  12.3*  10.9*   RBC  3.96*  4.09*   HEMOGLOBIN  12.6*  13.2*   HEMATOCRIT  37.0*  38.4*   MCV  93.4  93.9   MCH  31.8  32.3   MCHC  34.1  34.4   RDW  44.8  44.1   PLATELETCT  163*  185   MPV  10.7  10.5     Recent Labs      09/08/17   0305  09/10/17   0350   SODIUM  135  133*   POTASSIUM  3.7  3.6   CHLORIDE  100  101   CO2  24  28   GLUCOSE  107*  131*   BUN  54*  47*   CREATININE  1.91*  1.83*   CALCIUM  8.1*  7.9*         Recent Labs      09/08/17   0305   BNPBTYPENAT  187*           CT   1.  No evidence of interstitial lung disease.    2.  Motion degraded study.    3.  Atelectasis versus scarring within the lung bases bilaterally.    4.  Atherosclerotic vascular calcification.    5.  Eventration of the right hemidiaphragm.   Assessment/Plan     Acute respiratory failure with hypoxia (CMS-HCC)   Assessment & Plan    Likely related to atelectasis.  Worsening on 5-6L of o2  Patient had fall recently and having lots of chest pain and not able to take deep breath  History of pulmonary fibrosis from previous CT  BNP elevated but improving  On PO Lasix 20 mg once a day  Aggressive breathing treatment with duoneb, IS  HRCT: no acute issue  Echocardiogram: LVEF: 70%, RVSP: 45-50 mmHg  Continue above treatment, titrate o2 as needed  IS: 10 time per hour  Consulted Dr. Espinosa        Lactic acidosis   Assessment & Plan    Resolved with IV fluid        Chronic kidney disease, stage III (moderate)   Assessment & Plan    Cr: stabalized  Avoid nephrotoxic medication  Follow CMP in the morning  on lasix to 20 mg once a day        Thrombocytopenia (CMS-HCC)- (present on admission)   Assessment & Plan    Follow CBC in a.m.        Dementia- (present on admission)    Assessment & Plan    Continue me romario        Paroxysmal atrial fibrillation (CMS-HCC)- (present on admission)   Assessment & Plan    Rate control  On digoxin and pradaxa            Reviewed items::  Labs reviewed, Medications reviewed and Radiology images reviewed  DVT: pradaxa.

## 2017-09-10 NOTE — CARE PLAN
Problem: Oxygenation:  Goal: Maintain adequate oxygenation dependent on patient condition  Outcome: PROGRESSING AS EXPECTED  Patient weaned down to 3.5 LPM for Sp02 >90%.  Patient didn't use supplemental 02 @ home prior to this hospitalization  Intervention: Manage oxygen therapy by monitoring pulse oximetry and/or ABG values  Continuous monitoring: patient in ICU  Intervention: Levels of oxygenation will improve to baseline  Weaning Fi02 as patient improves/tolerates      Problem: Hyperinflation:  Goal: Prevent or improve atelectasis  Outcome: PROGRESSING AS EXPECTED  Patient uses theaPEP and IS with good effort: last night patient did reach 700 ml once with other efforts @ 500 ml  Intervention: Instruct incentive spirometry usage  Patient has IS @ bedside but does need encouragement and instruction for correct use

## 2017-09-10 NOTE — PROGRESS NOTES
Report called to [facility] Dorothy FERGUSON. Aware that patient received OXY IR 5 mg at 1030.

## 2017-09-10 NOTE — DISCHARGE PLANNING
KLARISSA notified of patient's medical clearance for discharge and transfer to [facility] today.  KLARISSA called [facility] and spoke with Pratibha.  Patient set up with transport to [facility] at 12:30 pm.  SW to complete Cobra and provide to Floor RN.

## 2017-09-10 NOTE — PROGRESS NOTES
Pt noted to be confused, pulling at smith catheter and tele box. Pt reoriented to place and event. Tele box re-placed on pt. Pt educated not to pull on smith or tele monitor. Given oxy for pain. Family at bedside visiting with pt.

## 2017-09-10 NOTE — PROGRESS NOTES
Report received from ANA Rios. Pt resting comfortably in bed, resps even and unlabored. Pt instructed to call Rn with any and all needs and agrees. Will continue with care.

## 2017-09-10 NOTE — PROGRESS NOTES
Report to ANA Carmona. POC reviewed. Pt sleeping at this time. In no apparent distress or discomfort.

## 2017-09-10 NOTE — FLOWSHEET NOTE
This note also relates to the following rows which could not be included:  Pulse - Cannot attach notes to unvalidated device data  Pulse Oximetry - Cannot attach notes to unvalidated device data       09/09/17 1900   Interdisciplinary Plan of Care-Goals (Indications)   Hyperinflation Protocol Indications Atelectasis Documented by Chest X-Ray   Interdisciplinary Plan of Care-Outcomes    Hyperinflation Protocol Goals/Outcome Improvement in Repeat CXR   Education   Education Yes - Pt. / Family has been Instructed in use of Respiratory Equipment   Therapy Not Performed   Type of Therapy Not Performed SVN   Reason Therapy Not Performed PRN, No Indication   PEP/CPT Group   PEP/CPT/Airway Clearance Therapy Yes   #PEP/CPT (Manual) Subsequent Subsequent   PEP/CPT Method Positive Airway Pressure Device   Date Disposable Equipment Last Changed 09/08/17   Date Disposable Equipment Next Change Due (Q 30 Days) 10/08/17   Incentive Spirometry Group   Incentive Spirometry Instruction Yes   Breathing Exercises Yes   Incentive Spirometer Volume 500 mL  (500 mL)   Incentive Spirometer Date Last Changed 09/06/17   Incentive Spirometer Next Change Date (Q 30 Days) 10/06/17   Chest Exam   Work Of Breathing / Effort Mild   Respiration (!) 22   Breath Sounds   RUL Breath Sounds Clear   RML Breath Sounds Clear;Diminished   RLL Breath Sounds Diminished   SARA Breath Sounds Clear   LLL Breath Sounds Diminished   Secretions   Cough Non Productive;Strong   How Sputum Obtained Spontaneous   Oximetry   #Pulse Oximetry (Single Determination) Yes   Continuous Oximetry Yes   Oxygen   Home O2 Use Prior To Admission? No   O2 (LPM) 3.5   O2 Daily Delivery Respiratory  Silicone Nasal Cannula

## 2017-09-10 NOTE — PROGRESS NOTES
"Patient appears more calm and rested at this time. Reports minimal pain. Refuses repositioning as he states \"I'm pretty good for the moment.\" Call light remains within reach.   "

## 2017-09-11 ENCOUNTER — HOSPITAL ENCOUNTER (INPATIENT)
Facility: MEDICAL CENTER | Age: 82
LOS: 1 days | DRG: 291 | End: 2017-09-12
Attending: EMERGENCY MEDICINE | Admitting: INTERNAL MEDICINE
Payer: MEDICARE

## 2017-09-11 ENCOUNTER — APPOINTMENT (OUTPATIENT)
Dept: RADIOLOGY | Facility: MEDICAL CENTER | Age: 82
DRG: 291 | End: 2017-09-11
Attending: EMERGENCY MEDICINE
Payer: MEDICARE

## 2017-09-11 ENCOUNTER — RESOLUTE PROFESSIONAL BILLING HOSPITAL PROF FEE (OUTPATIENT)
Dept: HOSPITALIST | Facility: MEDICAL CENTER | Age: 82
End: 2017-09-11
Payer: MEDICARE

## 2017-09-11 ENCOUNTER — APPOINTMENT (OUTPATIENT)
Dept: RADIOLOGY | Facility: MEDICAL CENTER | Age: 82
DRG: 291 | End: 2017-09-11
Attending: INTERNAL MEDICINE
Payer: MEDICARE

## 2017-09-11 DIAGNOSIS — J90 PLEURAL EFFUSION: ICD-10-CM

## 2017-09-11 DIAGNOSIS — R09.02 HYPOXIA: ICD-10-CM

## 2017-09-11 DIAGNOSIS — I50.9 CONGESTIVE HEART FAILURE, UNSPECIFIED CONGESTIVE HEART FAILURE CHRONICITY, UNSPECIFIED CONGESTIVE HEART FAILURE TYPE: ICD-10-CM

## 2017-09-11 DIAGNOSIS — R06.03 RESPIRATORY DISTRESS: ICD-10-CM

## 2017-09-11 DIAGNOSIS — N18.3 CKD (CHRONIC KIDNEY DISEASE), STAGE 3 (MODERATE): ICD-10-CM

## 2017-09-11 DIAGNOSIS — J96.01 ACUTE RESPIRATORY FAILURE WITH HYPOXIA (HCC): ICD-10-CM

## 2017-09-11 PROBLEM — R45.1 AGITATION: Status: ACTIVE | Noted: 2017-09-11

## 2017-09-11 LAB
ALBUMIN SERPL BCP-MCNC: 2.8 G/DL (ref 3.2–4.9)
ALBUMIN/GLOB SERPL: 0.9 G/DL
ALP SERPL-CCNC: 98 U/L (ref 30–99)
ALT SERPL-CCNC: 13 U/L (ref 2–50)
ANION GAP SERPL CALC-SCNC: 9 MMOL/L (ref 0–11.9)
APTT PPP: 44.2 SEC (ref 24.7–36)
AST SERPL-CCNC: 17 U/L (ref 12–45)
BASOPHILS # BLD AUTO: 0.3 % (ref 0–1.8)
BASOPHILS # BLD: 0.04 K/UL (ref 0–0.12)
BILIRUB SERPL-MCNC: 1.1 MG/DL (ref 0.1–1.5)
BNP SERPL-MCNC: 211 PG/ML (ref 0–100)
BNP SERPL-MCNC: 240 PG/ML (ref 0–100)
BUN SERPL-MCNC: 45 MG/DL (ref 8–22)
CALCIUM SERPL-MCNC: 7.9 MG/DL (ref 8.4–10.2)
CHLORIDE SERPL-SCNC: 103 MMOL/L (ref 96–112)
CO2 SERPL-SCNC: 24 MMOL/L (ref 20–33)
CREAT SERPL-MCNC: 1.53 MG/DL (ref 0.5–1.4)
EKG IMPRESSION: NORMAL
EOSINOPHIL # BLD AUTO: 0.31 K/UL (ref 0–0.51)
EOSINOPHIL NFR BLD: 2.6 % (ref 0–6.9)
ERYTHROCYTE [DISTWIDTH] IN BLOOD BY AUTOMATED COUNT: 43.8 FL (ref 35.9–50)
GFR SERPL CREATININE-BSD FRML MDRD: 43 ML/MIN/1.73 M 2
GLOBULIN SER CALC-MCNC: 3.2 G/DL (ref 1.9–3.5)
GLUCOSE SERPL-MCNC: 115 MG/DL (ref 65–99)
HCT VFR BLD AUTO: 36.1 % (ref 42–52)
HGB BLD-MCNC: 12.5 G/DL (ref 14–18)
IMM GRANULOCYTES # BLD AUTO: 0.08 K/UL (ref 0–0.11)
IMM GRANULOCYTES NFR BLD AUTO: 0.7 % (ref 0–0.9)
INR PPP: 1.44 (ref 0.87–1.13)
LYMPHOCYTES # BLD AUTO: 1.23 K/UL (ref 1–4.8)
LYMPHOCYTES NFR BLD: 10.4 % (ref 22–41)
MAGNESIUM SERPL-MCNC: 1.7 MG/DL (ref 1.5–2.5)
MCH RBC QN AUTO: 32.6 PG (ref 27–33)
MCHC RBC AUTO-ENTMCNC: 34.6 G/DL (ref 33.7–35.3)
MCV RBC AUTO: 94 FL (ref 81.4–97.8)
MONOCYTES # BLD AUTO: 0.63 K/UL (ref 0–0.85)
MONOCYTES NFR BLD AUTO: 5.3 % (ref 0–13.4)
NEUTROPHILS # BLD AUTO: 9.49 K/UL (ref 1.82–7.42)
NEUTROPHILS NFR BLD: 80.7 % (ref 44–72)
NRBC # BLD AUTO: 0 K/UL
NRBC BLD AUTO-RTO: 0 /100 WBC
PHOSPHATE SERPL-MCNC: 2.6 MG/DL (ref 2.5–4.5)
PLATELET # BLD AUTO: 178 K/UL (ref 164–446)
PMV BLD AUTO: 10.3 FL (ref 9–12.9)
POTASSIUM SERPL-SCNC: 4 MMOL/L (ref 3.6–5.5)
PROT SERPL-MCNC: 6 G/DL (ref 6–8.2)
PROTHROMBIN TIME: 17.3 SEC (ref 12–14.6)
RBC # BLD AUTO: 3.84 M/UL (ref 4.7–6.1)
SODIUM SERPL-SCNC: 136 MMOL/L (ref 135–145)
TROPONIN I SERPL-MCNC: 0.02 NG/ML (ref 0–0.04)
TROPONIN I SERPL-MCNC: 0.03 NG/ML (ref 0–0.04)
WBC # BLD AUTO: 11.8 K/UL (ref 4.8–10.8)

## 2017-09-11 PROCEDURE — 83735 ASSAY OF MAGNESIUM: CPT

## 2017-09-11 PROCEDURE — 94667 MNPJ CHEST WALL 1ST: CPT

## 2017-09-11 PROCEDURE — 71010 DX-CHEST-LIMITED (1 VIEW): CPT

## 2017-09-11 PROCEDURE — 94668 MNPJ CHEST WALL SBSQ: CPT

## 2017-09-11 PROCEDURE — 93005 ELECTROCARDIOGRAM TRACING: CPT | Performed by: EMERGENCY MEDICINE

## 2017-09-11 PROCEDURE — 85610 PROTHROMBIN TIME: CPT

## 2017-09-11 PROCEDURE — 83880 ASSAY OF NATRIURETIC PEPTIDE: CPT

## 2017-09-11 PROCEDURE — 770020 HCHG ROOM/CARE - TELE (206)

## 2017-09-11 PROCEDURE — 99285 EMERGENCY DEPT VISIT HI MDM: CPT

## 2017-09-11 PROCEDURE — A9270 NON-COVERED ITEM OR SERVICE: HCPCS | Performed by: INTERNAL MEDICINE

## 2017-09-11 PROCEDURE — 94760 N-INVAS EAR/PLS OXIMETRY 1: CPT

## 2017-09-11 PROCEDURE — 84100 ASSAY OF PHOSPHORUS: CPT

## 2017-09-11 PROCEDURE — 304561 HCHG STAT O2

## 2017-09-11 PROCEDURE — 700102 HCHG RX REV CODE 250 W/ 637 OVERRIDE(OP): Performed by: INTERNAL MEDICINE

## 2017-09-11 PROCEDURE — 36415 COLL VENOUS BLD VENIPUNCTURE: CPT

## 2017-09-11 PROCEDURE — 80053 COMPREHEN METABOLIC PANEL: CPT

## 2017-09-11 PROCEDURE — G8979 MOBILITY GOAL STATUS: HCPCS | Mod: CJ

## 2017-09-11 PROCEDURE — G8978 MOBILITY CURRENT STATUS: HCPCS | Mod: CK

## 2017-09-11 PROCEDURE — 700111 HCHG RX REV CODE 636 W/ 250 OVERRIDE (IP): Performed by: INTERNAL MEDICINE

## 2017-09-11 PROCEDURE — G8987 SELF CARE CURRENT STATUS: HCPCS | Mod: CL

## 2017-09-11 PROCEDURE — 99223 1ST HOSP IP/OBS HIGH 75: CPT | Mod: AI | Performed by: INTERNAL MEDICINE

## 2017-09-11 PROCEDURE — G8988 SELF CARE GOAL STATUS: HCPCS | Mod: CJ

## 2017-09-11 PROCEDURE — 85025 COMPLETE CBC W/AUTO DIFF WBC: CPT

## 2017-09-11 PROCEDURE — 97165 OT EVAL LOW COMPLEX 30 MIN: CPT

## 2017-09-11 PROCEDURE — 97161 PT EVAL LOW COMPLEX 20 MIN: CPT

## 2017-09-11 PROCEDURE — 84484 ASSAY OF TROPONIN QUANT: CPT

## 2017-09-11 PROCEDURE — 85730 THROMBOPLASTIN TIME PARTIAL: CPT

## 2017-09-11 RX ORDER — OXYCODONE HYDROCHLORIDE 5 MG/1
5 TABLET ORAL EVERY 8 HOURS PRN
Status: DISCONTINUED | OUTPATIENT
Start: 2017-09-11 | End: 2017-09-12 | Stop reason: HOSPADM

## 2017-09-11 RX ORDER — TAMSULOSIN HYDROCHLORIDE 0.4 MG/1
0.4 CAPSULE ORAL
Status: DISCONTINUED | OUTPATIENT
Start: 2017-09-11 | End: 2017-09-12 | Stop reason: HOSPADM

## 2017-09-11 RX ORDER — HALOPERIDOL 5 MG/ML
5 INJECTION INTRAMUSCULAR EVERY 4 HOURS PRN
Status: DISCONTINUED | OUTPATIENT
Start: 2017-09-11 | End: 2017-09-12 | Stop reason: HOSPADM

## 2017-09-11 RX ORDER — AMOXICILLIN 250 MG
2 CAPSULE ORAL 2 TIMES DAILY
Status: DISCONTINUED | OUTPATIENT
Start: 2017-09-11 | End: 2017-09-12 | Stop reason: HOSPADM

## 2017-09-11 RX ORDER — POLYETHYLENE GLYCOL 3350 17 G/17G
1 POWDER, FOR SOLUTION ORAL
Status: DISCONTINUED | OUTPATIENT
Start: 2017-09-11 | End: 2017-09-12 | Stop reason: HOSPADM

## 2017-09-11 RX ORDER — ONDANSETRON 2 MG/ML
4 INJECTION INTRAMUSCULAR; INTRAVENOUS EVERY 4 HOURS PRN
Status: DISCONTINUED | OUTPATIENT
Start: 2017-09-11 | End: 2017-09-12 | Stop reason: HOSPADM

## 2017-09-11 RX ORDER — ONDANSETRON 4 MG/1
4 TABLET, ORALLY DISINTEGRATING ORAL EVERY 4 HOURS PRN
Status: DISCONTINUED | OUTPATIENT
Start: 2017-09-11 | End: 2017-09-12 | Stop reason: HOSPADM

## 2017-09-11 RX ORDER — DABIGATRAN ETEXILATE 75 MG/1
75 CAPSULE ORAL 2 TIMES DAILY
Status: DISCONTINUED | OUTPATIENT
Start: 2017-09-11 | End: 2017-09-12 | Stop reason: HOSPADM

## 2017-09-11 RX ORDER — ACETAMINOPHEN 325 MG/1
650 TABLET ORAL EVERY 6 HOURS PRN
Status: DISCONTINUED | OUTPATIENT
Start: 2017-09-11 | End: 2017-09-12 | Stop reason: HOSPADM

## 2017-09-11 RX ORDER — ESCITALOPRAM OXALATE 10 MG/1
10 TABLET ORAL DAILY
Status: DISCONTINUED | OUTPATIENT
Start: 2017-09-11 | End: 2017-09-12 | Stop reason: HOSPADM

## 2017-09-11 RX ORDER — OMEPRAZOLE 20 MG/1
20 CAPSULE, DELAYED RELEASE ORAL DAILY
Status: DISCONTINUED | OUTPATIENT
Start: 2017-09-11 | End: 2017-09-12 | Stop reason: HOSPADM

## 2017-09-11 RX ORDER — BISACODYL 10 MG
10 SUPPOSITORY, RECTAL RECTAL
Status: DISCONTINUED | OUTPATIENT
Start: 2017-09-11 | End: 2017-09-12 | Stop reason: HOSPADM

## 2017-09-11 RX ORDER — MEMANTINE HYDROCHLORIDE 10 MG/1
5 TABLET ORAL DAILY
Status: DISCONTINUED | OUTPATIENT
Start: 2017-09-11 | End: 2017-09-12 | Stop reason: HOSPADM

## 2017-09-11 RX ORDER — GUAIFENESIN/DEXTROMETHORPHAN 100-10MG/5
10 SYRUP ORAL EVERY 6 HOURS PRN
Status: DISCONTINUED | OUTPATIENT
Start: 2017-09-11 | End: 2017-09-12 | Stop reason: HOSPADM

## 2017-09-11 RX ORDER — DOCUSATE SODIUM 100 MG/1
100 CAPSULE, LIQUID FILLED ORAL 2 TIMES DAILY
Status: DISCONTINUED | OUTPATIENT
Start: 2017-09-11 | End: 2017-09-11

## 2017-09-11 RX ORDER — FUROSEMIDE 10 MG/ML
40 INJECTION INTRAMUSCULAR; INTRAVENOUS
Status: DISCONTINUED | OUTPATIENT
Start: 2017-09-11 | End: 2017-09-12

## 2017-09-11 RX ORDER — HEPARIN SODIUM 5000 [USP'U]/ML
5000 INJECTION, SOLUTION INTRAVENOUS; SUBCUTANEOUS EVERY 8 HOURS
Status: DISCONTINUED | OUTPATIENT
Start: 2017-09-11 | End: 2017-09-11

## 2017-09-11 RX ADMIN — DABIGATRAN ETEXILATE MESYLATE 75 MG: 75 CAPSULE ORAL at 09:33

## 2017-09-11 RX ADMIN — ESCITALOPRAM OXALATE 10 MG: 10 TABLET ORAL at 09:33

## 2017-09-11 RX ADMIN — DOCUSATE SODIUM AND SENNOSIDES 2 TABLET: 8.6; 5 TABLET, FILM COATED ORAL at 09:33

## 2017-09-11 RX ADMIN — TAMSULOSIN HYDROCHLORIDE 0.4 MG: 0.4 CAPSULE ORAL at 09:34

## 2017-09-11 RX ADMIN — MEMANTINE HYDROCHLORIDE 5 MG: 10 TABLET ORAL at 09:33

## 2017-09-11 RX ADMIN — DABIGATRAN ETEXILATE MESYLATE 75 MG: 75 CAPSULE ORAL at 20:17

## 2017-09-11 RX ADMIN — FUROSEMIDE 40 MG: 10 INJECTION, SOLUTION INTRAVENOUS at 09:33

## 2017-09-11 RX ADMIN — OXYCODONE HYDROCHLORIDE 5 MG: 5 TABLET ORAL at 04:13

## 2017-09-11 RX ADMIN — ACETAMINOPHEN 650 MG: 325 TABLET, FILM COATED ORAL at 20:17

## 2017-09-11 RX ADMIN — ACETAMINOPHEN 650 MG: 325 TABLET, FILM COATED ORAL at 04:13

## 2017-09-11 RX ADMIN — OMEPRAZOLE 20 MG: 20 CAPSULE, DELAYED RELEASE ORAL at 09:33

## 2017-09-11 ASSESSMENT — PAIN SCALES - GENERAL
PAINLEVEL_OUTOF10: 8
PAINLEVEL_OUTOF10: 0
PAINLEVEL_OUTOF10: 6
PAINLEVEL_OUTOF10: ASSUMED PAIN PRESENT
PAINLEVEL_OUTOF10: 0
PAINLEVEL_OUTOF10: 0

## 2017-09-11 ASSESSMENT — PATIENT HEALTH QUESTIONNAIRE - PHQ9
1. LITTLE INTEREST OR PLEASURE IN DOING THINGS: NOT AT ALL
SUM OF ALL RESPONSES TO PHQ QUESTIONS 1-9: 0
2. FEELING DOWN, DEPRESSED, IRRITABLE, OR HOPELESS: NOT AT ALL
SUM OF ALL RESPONSES TO PHQ9 QUESTIONS 1 AND 2: 0

## 2017-09-11 ASSESSMENT — ENCOUNTER SYMPTOMS
VOMITING: 0
FOCAL WEAKNESS: 0
DIAPHORESIS: 0
BACK PAIN: 0
DOUBLE VISION: 0
MYALGIAS: 0
WEAKNESS: 1
BLURRED VISION: 0
SENSORY CHANGE: 0
WHEEZING: 0
NERVOUS/ANXIOUS: 1
CHILLS: 0
PALPITATIONS: 0
DEPRESSION: 0
MEMORY LOSS: 0
SPEECH CHANGE: 0
HEADACHES: 0
FLANK PAIN: 0
SHORTNESS OF BREATH: 1
FEVER: 0
NAUSEA: 0
ABDOMINAL PAIN: 0
DIZZINESS: 0
SORE THROAT: 0
SPUTUM PRODUCTION: 0
COUGH: 0

## 2017-09-11 ASSESSMENT — GAIT ASSESSMENTS
ASSISTIVE DEVICE: FRONT WHEEL WALKER
DISTANCE (FEET): 15
DEVIATION: SHUFFLED GAIT
GAIT LEVEL OF ASSIST: MINIMAL ASSIST

## 2017-09-11 ASSESSMENT — LIFESTYLE VARIABLES
DO YOU DRINK ALCOHOL: NO
DO YOU DRINK ALCOHOL: NO
EVER_SMOKED: YES

## 2017-09-11 ASSESSMENT — CHA2DS2 SCORE
AGE 75 OR GREATER: YES
VASCULAR DISEASE: NO
CHA2DS2 VASC SCORE: 4
DIABETES: NO
SEX: MALE
AGE 65 TO 74: NO
HYPERTENSION: YES
CHF OR LEFT VENTRICULAR DYSFUNCTION: YES
PRIOR STROKE OR TIA OR THROMBOEMBOLISM: NO

## 2017-09-11 ASSESSMENT — ACTIVITIES OF DAILY LIVING (ADL): TOILETING: UNABLE TO DETERMINE AT THIS TIME

## 2017-09-11 NOTE — CARE PLAN
Problem: Safety  Goal: Will remain free from injury  Outcome: PROGRESSING AS EXPECTED    Intervention: Provide assistance with mobility  Generalized weakness and confusion, fall precaution in placed bed alarm on at all times.      Problem: Knowledge Deficit  Goal: Knowledge of disease process/condition, treatment plan, diagnostic tests, and medications will improve  Outcome: PROGRESSING AS EXPECTED    Intervention: Explain information regarding disease process/condition, treatment plan, diagnostic tests, and medications and document in education  Pt. And family updated with plan of care, all questions answered.

## 2017-09-11 NOTE — ASSESSMENT & PLAN NOTE
Secondary to diastolic heart failure as well as agitation/dementia  Continue RT protocol continue oxygen   Continue Lasix diuresis  Improving

## 2017-09-11 NOTE — DISCHARGE PLANNING
Received choice form from [facility] at 1507.  Referral sent to [facility] at 1520 on 09-11-17.

## 2017-09-11 NOTE — H&P
Hospital Medicine History and Physical    Date of Service  9/11/2017    Chief Complaint  Chief Complaint   Patient presents with   • Shortness of Breath     Pt BIB EMS, c/o SOB, O2 saturation 82% on 5L at [facility]. Pt recently d/c from hospital today. Had fall Monday, but CT showed no rib fractures.        History of Presenting Illness  89 y.o. male who presented 9/11/2017 with History of atrial fibrillation on chronic Pradaxa with recently treated diastolic heart failure on Lasix was transferred from skilled nursing facility secondary to acute respiratory failure with hypoxia. Patient is receiving treatment at skilled nursing facility for diastolic heart failure and respiratory failure. Patient was noted to have agitation earlier this evening with resultant shortness of breath requiring 12 L face mask oxygen supplementation.    On my evaluation patient has been tapered to 6 L oxygen supplementation. Patient is awake alert and oriented ×2 to person and place. Patient states that he was getting frustrated at the nursing facility because no one was able to answer his questions. Patient does recall feeling more short of breath. At this time patient feels better. Shortness of breath has improved. Patient denies any associated cough, chest pain, lower extremity pain. Negative nausea vomiting diarrhea or dysuria or abdominal pain. Patient does have a Salinas catheter in place.    Patient does complain of right-sided lateral , chest pain worse with movement and palpation .    Primary Care Physician  Thelma Cervantes M.D.    Consultants  None    Code Status  Full code    Review of Systems  Review of Systems   Constitutional: Negative for chills, diaphoresis, fever and malaise/fatigue.   HENT: Negative for congestion, hearing loss and sore throat.    Eyes: Negative for blurred vision and double vision.   Respiratory: Positive for shortness of breath. Negative for cough, sputum production and wheezing.     Cardiovascular: Negative for chest pain, palpitations and leg swelling.   Gastrointestinal: Negative for abdominal pain, nausea and vomiting.   Genitourinary: Negative for dysuria and flank pain.   Musculoskeletal: Negative for back pain, joint pain and myalgias.   Neurological: Positive for weakness. Negative for dizziness, sensory change, speech change, focal weakness and headaches.   Psychiatric/Behavioral: Negative for depression and memory loss. The patient is nervous/anxious.         Past Medical History  Past Medical History:   Diagnosis Date   • Arrhythmia    • Atrial fibrillation (CMS-HCC)    • Cancer (CMS-HCC)     prostate   • Congestive heart failure (CMS-HCC)    • Hypertension        Surgical History  Past Surgical History:   Procedure Laterality Date   • CHOLECYSTECTOMY     • GYN SURGERY      leyda   • LUMBAR FUSION POSTERIOR     • OTHER CARDIAC SURGERY      A-fib   • OTHER CARDIAC SURGERY      chf   • OTHER ORTHOPEDIC SURGERY      lumbar lami   • SHOULDER ARTHROSCOPY         Medications  No current facility-administered medications on file prior to encounter.      Current Outpatient Prescriptions on File Prior to Encounter   Medication Sig Dispense Refill   • oxycodone immediate-release (ROXICODONE) 5 MG Tab Take 1 Tab by mouth every 8 hours as needed. 20 Tab 0   • carbamide peroxide (DEBROX) 6.5 % Solution Place 5 Drops in ear 2 times a day.     • memantine (NAMENDA) 5 MG Tab Take 5 mg by mouth every day.     • dabigatran (PRADAXA) 75 MG Cap capsule Take 75 mg by mouth 2 Times a Day.     • escitalopram (LEXAPRO) 10 MG Tab Take 1 Tab by mouth every day. 30 Tab 3   • omeprazole (PRILOSEC) 20 MG delayed-release capsule Take 1 Cap by mouth every day. 30 Cap 11   • tamsulosin (FLOMAX) 0.4 MG capsule Take 1 Cap by mouth ONE-HALF HOUR AFTER BREAKFAST. 30 Cap    • vitamin D, Ergocalciferol, (DRISDOL) 44814 UNITS Cap capsule Take 1 Cap by mouth every 7 days. 30 Cap    • docusate sodium (COLACE) 100 MG Cap  Take 100 mg by mouth 2 times a day.     • ascorbic acid (ASCORBIC ACID) 500 MG Tab Take 500 mg by mouth every day.         Family History  History reviewed. No pertinent family history.    Social History  Social History   Substance Use Topics   • Smoking status: Never Smoker   • Smokeless tobacco: Never Used   • Alcohol use No      Comment: occaisiona       Allergies  Allergies   Allergen Reactions   • Beta Adrenergic Blockers Anaphylaxis     HR goes into teens, Cardiology wants him to never have another beta blocker   • Penicillins      Pt bad historian        Physical Exam  Laboratory   Hemodynamics  No data recorded.      Pulse  Av  Min: 63  Max: 71 Heart Rate (Monitored): 63  Blood Pressure : 104/59, NIBP: 119/64      Respiratory      Respiration: (!) 21, Pulse Oximetry: 92 %     Work Of Breathing / Effort: Tachypnea;Increased Work of Breathing  RUL Breath Sounds: Coarse Crackles, RML Breath Sounds: Coarse Crackles, RLL Breath Sounds: Diminished, SARA Breath Sounds: Diminished, LLL Breath Sounds: Diminished    Physical Exam   Constitutional: He appears well-nourished. No distress.   Elderly   HENT:   Head: Normocephalic and atraumatic.   Nose: Nose normal.   Mouth/Throat: No oropharyngeal exudate.   Eyes: EOM are normal. Pupils are equal, round, and reactive to light. Right eye exhibits no discharge. Left eye exhibits no discharge.   Neck: Neck supple. No JVD present. No thyromegaly present.   Cardiovascular: Normal rate and intact distal pulses.    Murmur heard.  Pulmonary/Chest: Breath sounds normal. No respiratory distress. He has no wheezes.   Abdominal: Soft. Bowel sounds are normal. He exhibits no distension and no mass. There is no tenderness.   Musculoskeletal: He exhibits tenderness. He exhibits no edema.   Neurological: He is alert. No cranial nerve deficit. He exhibits normal muscle tone. Coordination normal.   Oriented ×2   Skin: Skin is warm and dry. No rash noted. He is not diaphoretic. No  erythema. No pallor.   Psychiatric: He has a normal mood and affect. His behavior is normal. Judgment and thought content normal.   Nursing note and vitals reviewed.      Recent Labs      09/08/17   0305  09/10/17   0350  09/11/17   0059   WBC  12.3*  10.9*  11.8*   RBC  3.96*  4.09*  3.84*   HEMOGLOBIN  12.6*  13.2*  12.5*   HEMATOCRIT  37.0*  38.4*  36.1*   MCV  93.4  93.9  94.0   MCH  31.8  32.3  32.6   MCHC  34.1  34.4  34.6   RDW  44.8  44.1  43.8   PLATELETCT  163*  185  178   MPV  10.7  10.5  10.3     Recent Labs      09/08/17   0305  09/10/17   0350  09/11/17   0059   SODIUM  135  133*  136   POTASSIUM  3.7  3.6  4.0   CHLORIDE  100  101  103   CO2  24  28  24   GLUCOSE  107*  131*  115*   BUN  54*  47*  45*   CREATININE  1.91*  1.83*  1.53*   CALCIUM  8.1*  7.9*  7.9*     Recent Labs      09/08/17   0305  09/10/17   0350  09/11/17   0059   ALTSGPT  13   --   13   ASTSGOT  27   --   17   ALKPHOSPHAT  97   --   98   TBILIRUBIN  1.0   --   1.1   GLUCOSE  107*  131*  115*     Recent Labs      09/11/17   0059   APTT  44.2*   INR  1.44*     Recent Labs      09/08/17 0305 09/11/17   0059   BNPBTYPENAT  187*  211*         Lab Results   Component Value Date    TROPONINI 0.02 09/11/2017     Urinalysis:    Lab Results  Component Value Date/Time   SPECGRAVITY 1.020 09/04/2017 1738   GLUCOSEUR Negative 09/04/2017 1738   KETONES Negative 09/04/2017 1738   NITRITE Negative 09/04/2017 1738   WBCURINE 0-2 (A) 09/04/2017 1738   RBCURINE 0-2 (A) 09/04/2017 1738   BACTERIA Rare (A) 09/04/2017 1738   EPITHELCELL Few 04/17/2015 0100        Imaging  DX-CHEST-LIMITED (1 VIEW)   Final Result      1.  Stable cardiomegaly.      2.  Stable left basilar opacification.      3.  Blunted left costophrenic angle suggests a small pleural effusion.           Assessment/Plan     I anticipate this patient is appropriate for observation status at this time.    * Acute respiratory failure with hypoxia (CMS-HCC)- (present on admission)    Assessment & Plan    Secondary to diastolic heart failure as well as agitation/dementia  Continue RT protocol continue oxygen   Continue Lasix diuresis  Improving          Acute diastolic CHF (congestive heart failure) (CMS-[facility])- (present on admission)   Assessment & Plan    Patient will be admitted to the telemetry observation unit  This is likely exacerbated by his underlying anxiety/dementia/agitation  Patient was recently discharged for acute heart failure requiring Lasix diuresis  Chest x-ray with left pleural effusion small  We'll continue RT protocol  Will continue oxygen supplementation as needed  We'll follow troponins  We will start patient back on IV Lasix diuresis  Continue home medication        Dementia- (present on admission)   Assessment & Plan    With acute agitation  Continue all medication  Haldol as needed        Chronic kidney disease, stage III (moderate)- (present on admission)   Assessment & Plan    Near baseline  Improving  Continue Lasix        HTN (hypertension)- (present on admission)   Assessment & Plan    Essential  Controlled        Paroxysmal atrial fibrillation (CMS-[facility])- (present on admission)   Assessment & Plan    On pyridoxine  Rate controlled            VTE prophylaxis:Pradaxa

## 2017-09-11 NOTE — PROGRESS NOTES
Admitted for acute hypoxic respiratory failure.  Just discharged from hospital yesterday.   Patient was seen and examined during round. Discussed with Dr. Macario and patient's son Dr. Holland.  His hypoxic is from atelectasis from his left sided chest pain which he had if from fall.  History of dementia. Need IS but due to dementia, he couldn't do that often  He desat with physical activity.  Once stable, [facility]

## 2017-09-11 NOTE — FLOWSHEET NOTE
09/11/17 1528   Interdisciplinary Plan of Care-Goals (Indications)   Hyperinflation Protocol Indications Atelectasis Documented by Chest X-Ray   Interdisciplinary Plan of Care-Outcomes    Hyperinflation Protocol Goals/Outcome Improvement in Repeat CXR   PEP/CPT Group   PEP/CPT/Airway Clearance Therapy Yes   #PEP/CPT (Manual) Subsequent Subsequent   PEP/CPT Method Positive Airway Pressure Device   Pressure 15   Chest Exam   Respiration 16   Breath Sounds   RUL Breath Sounds Clear   RML Breath Sounds Clear   RLL Breath Sounds Diminished   SARA Breath Sounds Clear   LLL Breath Sounds Crackles;Diminished   Oximetry   #Pulse Oximetry (Single Determination) Yes   Oxygen   Pulse Oximetry 95 %   O2 (LPM) 2.5   O2 Daily Delivery Respiratory  Nasal Cannula

## 2017-09-11 NOTE — PROGRESS NOTES
Resting in bed, son at bedside, POC discussed, seen by MD, confused, fall precaution in placed, will continue to monitor.

## 2017-09-11 NOTE — ED NOTES
Chief Complaint   Patient presents with   • Shortness of Breath     Pt BIB EMS, c/o SOB, O2 saturation 82% on 5L at [facility]. Pt recently d/c from hospital today. Had fall Monday, but CT showed no rib fractures.      Son at Bedside. Pt hx dementia, poor historian.

## 2017-09-11 NOTE — THERAPY
"Physical Therapy Evaluation completed.   Bed Mobility:  Supine to Sit: Moderate Assist  Transfers: Sit to Stand: Minimal Assist  Gait: Level Of Assist: Minimal Assist with Front-Wheel Walker x 15 ft, frequent cues to keep walker closer to body.      Plan of Care: Will benefit from Physical Therapy 3 times per week  Discharge Recommendations: Equipment: Will Continue to Assess for Equipment Needs. Post-acute therapy Discharge to a transitional care facility for continued skilled therapy services.    See \"Rehab Therapy-Acute\" Patient Summary Report for complete documentation.     "

## 2017-09-11 NOTE — THERAPY
"Occupational Therapy Evaluation completed.   Functional Status:  A&Ox2. Salinas in place. Sup to sit with CGA. STS with CGA. ADL transfer with CGA. Seated grooming with CGA. Limited AROM L shldr.    Plan of Care: Will benefit from Occupational Therapy 3 times per week  Discharge Recommendations:  Equipment: Will Continue to Assess for Equipment Needs. Post-acute therapy Discharge to a transitional care facility for continued skilled therapy services.  See \"Rehab Therapy-Acute\" Patient Summary Report for complete documentation.    "

## 2017-09-11 NOTE — ED PROVIDER NOTES
ED Provider Note    CHIEF COMPLAINT  Chief Complaint   Patient presents with   • Shortness of Breath     Pt BIB EMS, c/o SOB, O2 saturation 82% on 5L at [facility]. Pt recently d/c from hospital today. Had fall Monday, but CT showed no rib fractures.        HPI  Jie Nunez is a 89 y.o. male who presents To emergency Department with oxygen desaturations. Patient has recently been brought in the hospital for recent respiratory distress. Patient's son at bedside is local restaurant urologist and provides history. Patient additionally has history of dementia. He was discharged earlier today to [facility] where he was noted to have abnormal vital signs and agitation this evening. After a few hours of the sons trying to console his agitation and persistent abnormal vital signs and oxygen supplementation needs he was brought to the hospital by EMS. EMS notes that there is significantly diminished breath sounds on the left. They had to increase the oxygen supplementation up to 25 L by nonrebreather. Patient otherwise asymptomatic at this time by his history. Again patient does have history of dementia so limited.    REVIEW OF SYSTEMS  See HPI for further details. All other systems are negative.     PAST MEDICAL HISTORY   has a past medical history of Arrhythmia; Atrial fibrillation (CMS-[facility]); Cancer (CMS-[facility]); Congestive heart failure (CMS-[facility]); and Hypertension.    SOCIAL HISTORY  Social History     Social History Main Topics   • Smoking status: Never Smoker   • Smokeless tobacco: Never Used   • Alcohol use No      Comment: occaisiona   • Drug use: No   • Sexual activity: Not on file       SURGICAL HISTORY   has a past surgical history that includes lumbar fusion posterior; cholecystectomy; gyn surgery; other orthopedic surgery; shoulder arthroscopy; other cardiac surgery; and other cardiac surgery.    CURRENT MEDICATIONS  Home Medications    **Home medications have not yet been reviewed for this encounter**          ALLERGIES  Allergies   Allergen Reactions   • Beta Adrenergic Blockers Anaphylaxis     HR goes into teens, Cardiology wants him to never have another beta blocker   • Penicillins      Pt bad historian       PHYSICAL EXAM  VITAL SIGNS: /59   Pulse 71   Resp (!) 24   SpO2 96%  @[email or web address][[id number]::@   Pulse ox interpretation: I interpret this pulse ox as normal.  Constitutional: Alert in no apparent distress.  HENT: No signs of trauma, Bilateral external ears normal, Nose normal.   Eyes: Pupils are equal and reactive, Conjunctiva normal, Non-icteric.   Neck: Normal range of motion, No tenderness, Supple, No stridor.   Lymphatic: No lymphadenopathy noted.   Cardiovascular: Regular rate and rhythm, no murmurs.   Thorax & Lungs:Tachypnea, diminished breath sounds on the left, bibasilar crackles  Abdomen: Bowel sounds normal, Soft, No tenderness, No masses, No pulsatile masses. No peritoneal signs.  Skin: Warm, Dry, No erythema, No rash.   Back: No bony tenderness, No CVA tenderness.   Extremities: Intact distal pulses, No edema, No tenderness, No cyanosis  Musculoskeletal: Good range of motion in all major joints. No tenderness to palpation or major deformities noted.   Neurologic: Alert , pleasantly demented Normal motor function, Normal sensory function, No focal deficits noted.   Psychiatric: Affect normal, Judgment normal, Mood normal.     Bedside ultrasound:    Symmetric cardiac squeeze with no significant evidence of acute right heart strain. No pericardial effusion. No evidence of pneumothorax on ultrasound.  DIAGNOSTIC STUDIES / PROCEDURES      EKG  Paced rhythm at a rate of 62 unchanged from prior    LABS  Results for orders placed or performed during the hospital encounter of 09/11/17   CBC w/ Differential   Result Value Ref Range    WBC 11.8 (H) 4.8 - 10.8 K/uL    RBC 3.84 (L) 4.70 - 6.10 M/uL    Hemoglobin 12.5 (L) 14.0 - 18.0 g/dL    Hematocrit 36.1 (L) 42.0 - 52.0 %    MCV 94.0 81.4 - 97.8 fL    MCH  32.6 27.0 - 33.0 pg    MCHC 34.6 33.7 - 35.3 g/dL    RDW 43.8 35.9 - 50.0 fL    Platelet Count 178 164 - 446 K/uL    MPV 10.3 9.0 - 12.9 fL    Neutrophils-Polys 80.70 (H) 44.00 - 72.00 %    Lymphocytes 10.40 (L) 22.00 - 41.00 %    Monocytes 5.30 0.00 - 13.40 %    Eosinophils 2.60 0.00 - 6.90 %    Basophils 0.30 0.00 - 1.80 %    Immature Granulocytes 0.70 0.00 - 0.90 %    Nucleated RBC 0.00 /100 WBC    Neutrophils (Absolute) 9.49 (H) 1.82 - 7.42 K/uL    Lymphs (Absolute) 1.23 1.00 - 4.80 K/uL    Monos (Absolute) 0.63 0.00 - 0.85 K/uL    Eos (Absolute) 0.31 0.00 - 0.51 K/uL    Baso (Absolute) 0.04 0.00 - 0.12 K/uL    Immature Granulocytes (abs) 0.08 0.00 - 0.11 K/uL    NRBC (Absolute) 0.00 K/uL   Complete Metabolic Panel (CMP)   Result Value Ref Range    Sodium 136 135 - 145 mmol/L    Potassium 4.0 3.6 - 5.5 mmol/L    Chloride 103 96 - 112 mmol/L    Co2 24 20 - 33 mmol/L    Anion Gap 9.0 0.0 - 11.9    Glucose 115 (H) 65 - 99 mg/dL    Bun 45 (H) 8 - 22 mg/dL    Creatinine 1.53 (H) 0.50 - 1.40 mg/dL    Calcium 7.9 (L) 8.4 - 10.2 mg/dL    AST(SGOT) 17 12 - 45 U/L    ALT(SGPT) 13 2 - 50 U/L    Alkaline Phosphatase 98 30 - 99 U/L    Total Bilirubin 1.1 0.1 - 1.5 mg/dL    Albumin 2.8 (L) 3.2 - 4.9 g/dL    Total Protein 6.0 6.0 - 8.2 g/dL    Globulin 3.2 1.9 - 3.5 g/dL    A-G Ratio 0.9 g/dL   Troponin STAT   Result Value Ref Range    Troponin I 0.02 0.00 - 0.04 ng/mL   Magnesium   Result Value Ref Range    Magnesium 1.7 1.5 - 2.5 mg/dL   Phosphorus   Result Value Ref Range    Phosphorus 2.6 2.5 - 4.5 mg/dL   PT/INR   Result Value Ref Range    PT 17.3 (H) 12.0 - 14.6 sec    INR 1.44 (H) 0.87 - 1.13   APTT   Result Value Ref Range    APTT 44.2 (H) 24.7 - 36.0 sec   ESTIMATED GFR   Result Value Ref Range    GFR If  52 (A) >60 mL/min/1.73 m 2    GFR If Non  43 (A) >60 mL/min/1.73 m 2   EKG (NOW)   Result Value Ref Range    Report       [facility] Emergency Dept.    Test  Date:  2017  Pt Name:    JAI ROJAS               Department: [facility]  MRN:        [id number]                      Room:       -ROOM 2  Gender:     M                            Technician: HRS  :        [id number]                   Requested By:BEVERLEY SAMPSON  Order #:    [id number]                    Reading MD:    Measurements  Intervals                                Axis  Rate:       62                           P:          0  NC:         228                          QRS:        -34  QRSD:       130                          T:          -76  QT:         468  QTc:        476    Interpretive Statements  VENTRICULAR-PACED COMPLEXES  NO FURTHER RHYTHM ANALYSIS ATTEMPTED DUE TO PACED RHYTHM  BORDERLINE AV CONDUCTION DELAY  RBBB AND LAFB  Compared to ECG 2017 03:06:06  Left anterior fascicular block now present  Right bundle-branch block now present           RADIOLOGY  DX-CHEST-LIMITED (1 VIEW)   Final Result      1.  Stable cardiomegaly.      2.  Stable left basilar opacification.      3.  Blunted left costophrenic angle suggests a small pleural effusion.              COURSE & MEDICAL DECISION MAKING  Pertinent Labs & Imaging studies reviewed. (See chart for details)  Patient presented to the emergency department with respiratory distress and hypoxia. Please see bedside ultrasound for initial evaluation. Stat portable chest x-ray also reviewed by myself and reviewed by radiology as noted above showing persistent vascular congestion and left small pleural effusion. The patient has been weaned down on oxygen supplementation maintaining oxygen saturation in the low 90s. Given his renal function I do not leave that CT imaging would be appropriate to further evaluate for pulmonary embolus although this remains on the differential. He may require VQ scan at a later point although I do not feel this is needed to be completed emergently and will not be able to be obtained in the next 4-5 hours given time of day  of visit. I discussed the findings with the son at the bedside and we will have the patient brought in under the hospitalist service for ongoing inpatient care and observation.        FINAL IMPRESSION  1. Pleural effusion    2. Hypoxia    3. Respiratory distress    4. Congestive heart failure, unspecified congestive heart failure chronicity, unspecified congestive heart failure type (CMS-HCC)    5. CKD (chronic kidney disease), stage 3 (moderate)            Electronically signed by: Jefferson Montiel, 9/11/2017 1:06 AM

## 2017-09-11 NOTE — DISCHARGE PLANNING
Care Transition Team Assessment    SW met with this patient and his son bedside to complete assessment and discuss referral to SNF.  Patient's son would still like him to go to [facility].  KLARISSA faxed completed choice form to [facility]. KLARISSA spoke with Glenny from [facility].  They are hold a bed for this patient and will be able to accept him when he is ready to return to their facility.     Information Source  Orientation : Disoriented to Place, Disoriented to Time, Disoriented to Event  Information Given By: Other (Comments) (previous record, patient d/cd yesterday)  Informant's Name: Fabian Nunez  Who is responsible for making decisions for patient? : Adult child  Name(s) of Primary Decision Maker: Fabian Nunez    Readmission Evaluation  Is this a readmission?: Yes - unplanned readmission  Why do you think you were readmitted?: Hypoxic and SNF  Did you have enough support after your last discharge?: Yes    Elopement Risk  Legal Hold: No  Ambulatory or Self Mobile in Wheelchair: No-Not an Elopement Risk  Elopement Risk: Not at Risk for Elopement    Interdisciplinary Discharge Planning  Does Admitting Nurse Feel This Could be a Complex Discharge?: No  Lives with - Patient's Self Care Capacity: Other (Comments) ([facility] and before that memory care)  Patient or legal guardian wants to designate a caregiver (see row info): No  Support Systems: Family Member(s)  Housing / Facility: Assisted Living Residence (as recently DC to [facility])  Do You Take your Prescribed Medications Regularly: No  Able to Return to Previous ADL's: Future Time w/Therapy  Mobility Issues: Yes  Prior Services: Continuous (24 Hour) Care Giving Per Service  Patient Expects to be Discharged to:: [facility]  Assistance Needed: Yes    Discharge Preparedness  What is your plan after discharge?: Skilled nursing facility    Finances  Financial Barriers to Discharge: No  Prescription Coverage: Yes    Vision / Hearing Impairment  Vision  Impairment : Yes  Right Eye Vision: Impaired, Wears Glasses  Left Eye Vision: Impaired, Wears Glasses  Hearing Impairment : Yes  Hearing Impairment: Both Ears              Domestic Abuse  Have you ever been the victim of abuse or violence?: No              Anticipated Discharge Information  Anticipated discharge disposition: SNF

## 2017-09-11 NOTE — FLOWSHEET NOTE
09/11/17 1000   Interdisciplinary Plan of Care-Goals (Indications)   Hyperinflation Protocol Indications Atelectasis Documented by Chest X-Ray   Interdisciplinary Plan of Care-Outcomes    Hyperinflation Protocol Goals/Outcome Improvement in Repeat CXR   PEP/CPT Group   PEP/CPT/Airway Clearance Therapy Yes   #PEP/CPT (Manual) Initial Initial   PEP/CPT Method Positive Airway Pressure Device   CPT Settings Yes   Pressure 15   Date Disposable Equipment Last Changed 09/11/17   Date Disposable Equipment Next Change Due (Q 30 Days) 10/11/17   Chest Exam   Respiration 20   Breath Sounds   RUL Breath Sounds Clear   RML Breath Sounds Diminished   RLL Breath Sounds Diminished   SARA Breath Sounds Clear   LLL Breath Sounds Crackles;Diminished   Oximetry   #Pulse Oximetry (Single Determination) Yes   Oxygen   Pulse Oximetry 96 %   O2 (LPM) 3   O2 Daily Delivery Respiratory  Nasal Cannula

## 2017-09-11 NOTE — PROGRESS NOTES
Confused, worked with PT,got up to the bathroom, had shower,uses walker, on cardiac chair, fall precaution in placed.

## 2017-09-12 VITALS
WEIGHT: 154.32 LBS | HEIGHT: 69 IN | BODY MASS INDEX: 22.86 KG/M2 | HEART RATE: 61 BPM | OXYGEN SATURATION: 95 % | SYSTOLIC BLOOD PRESSURE: 153 MMHG | DIASTOLIC BLOOD PRESSURE: 87 MMHG | RESPIRATION RATE: 18 BRPM | TEMPERATURE: 97.3 F

## 2017-09-12 PROBLEM — I50.9 CONGESTIVE HEART FAILURE, ACUTE (HCC): Status: RESOLVED | Noted: 2017-09-11 | Resolved: 2017-09-12

## 2017-09-12 LAB
ALBUMIN SERPL BCP-MCNC: 3 G/DL (ref 3.2–4.9)
ALBUMIN/GLOB SERPL: 0.9 G/DL
ALP SERPL-CCNC: 104 U/L (ref 30–99)
ALT SERPL-CCNC: 12 U/L (ref 2–50)
ANION GAP SERPL CALC-SCNC: 9 MMOL/L (ref 0–11.9)
AST SERPL-CCNC: 14 U/L (ref 12–45)
BASOPHILS # BLD AUTO: 0.5 % (ref 0–1.8)
BASOPHILS # BLD: 0.06 K/UL (ref 0–0.12)
BILIRUB SERPL-MCNC: 0.6 MG/DL (ref 0.1–1.5)
BUN SERPL-MCNC: 48 MG/DL (ref 8–22)
CALCIUM SERPL-MCNC: 8.5 MG/DL (ref 8.4–10.2)
CHLORIDE SERPL-SCNC: 102 MMOL/L (ref 96–112)
CO2 SERPL-SCNC: 25 MMOL/L (ref 20–33)
CREAT SERPL-MCNC: 1.95 MG/DL (ref 0.5–1.4)
EOSINOPHIL # BLD AUTO: 0.57 K/UL (ref 0–0.51)
EOSINOPHIL NFR BLD: 5.1 % (ref 0–6.9)
ERYTHROCYTE [DISTWIDTH] IN BLOOD BY AUTOMATED COUNT: 44.5 FL (ref 35.9–50)
GFR SERPL CREATININE-BSD FRML MDRD: 32 ML/MIN/1.73 M 2
GLOBULIN SER CALC-MCNC: 3.4 G/DL (ref 1.9–3.5)
GLUCOSE SERPL-MCNC: 121 MG/DL (ref 65–99)
HCT VFR BLD AUTO: 39.1 % (ref 42–52)
HGB BLD-MCNC: 13.3 G/DL (ref 14–18)
IMM GRANULOCYTES # BLD AUTO: 0.09 K/UL (ref 0–0.11)
IMM GRANULOCYTES NFR BLD AUTO: 0.8 % (ref 0–0.9)
LYMPHOCYTES # BLD AUTO: 1.55 K/UL (ref 1–4.8)
LYMPHOCYTES NFR BLD: 13.9 % (ref 22–41)
MCH RBC QN AUTO: 32.2 PG (ref 27–33)
MCHC RBC AUTO-ENTMCNC: 34 G/DL (ref 33.7–35.3)
MCV RBC AUTO: 94.7 FL (ref 81.4–97.8)
MONOCYTES # BLD AUTO: 0.85 K/UL (ref 0–0.85)
MONOCYTES NFR BLD AUTO: 7.6 % (ref 0–13.4)
NEUTROPHILS # BLD AUTO: 8.07 K/UL (ref 1.82–7.42)
NEUTROPHILS NFR BLD: 72.1 % (ref 44–72)
NRBC # BLD AUTO: 0 K/UL
NRBC BLD AUTO-RTO: 0 /100 WBC
PLATELET # BLD AUTO: 192 K/UL (ref 164–446)
PMV BLD AUTO: 10.2 FL (ref 9–12.9)
POTASSIUM SERPL-SCNC: 4.2 MMOL/L (ref 3.6–5.5)
PROT SERPL-MCNC: 6.4 G/DL (ref 6–8.2)
RBC # BLD AUTO: 4.13 M/UL (ref 4.7–6.1)
SODIUM SERPL-SCNC: 136 MMOL/L (ref 135–145)
WBC # BLD AUTO: 11.2 K/UL (ref 4.8–10.8)

## 2017-09-12 PROCEDURE — 99239 HOSP IP/OBS DSCHRG MGMT >30: CPT | Performed by: INTERNAL MEDICINE

## 2017-09-12 PROCEDURE — 700111 HCHG RX REV CODE 636 W/ 250 OVERRIDE (IP): Performed by: INTERNAL MEDICINE

## 2017-09-12 PROCEDURE — 80053 COMPREHEN METABOLIC PANEL: CPT

## 2017-09-12 PROCEDURE — 85025 COMPLETE CBC W/AUTO DIFF WBC: CPT

## 2017-09-12 PROCEDURE — A9270 NON-COVERED ITEM OR SERVICE: HCPCS | Performed by: INTERNAL MEDICINE

## 2017-09-12 PROCEDURE — 94668 MNPJ CHEST WALL SBSQ: CPT

## 2017-09-12 PROCEDURE — 94760 N-INVAS EAR/PLS OXIMETRY 1: CPT

## 2017-09-12 PROCEDURE — 700102 HCHG RX REV CODE 250 W/ 637 OVERRIDE(OP): Performed by: INTERNAL MEDICINE

## 2017-09-12 RX ADMIN — OMEPRAZOLE 20 MG: 20 CAPSULE, DELAYED RELEASE ORAL at 08:49

## 2017-09-12 RX ADMIN — TAMSULOSIN HYDROCHLORIDE 0.4 MG: 0.4 CAPSULE ORAL at 08:49

## 2017-09-12 RX ADMIN — MEMANTINE HYDROCHLORIDE 5 MG: 10 TABLET ORAL at 08:49

## 2017-09-12 RX ADMIN — ESCITALOPRAM OXALATE 10 MG: 10 TABLET ORAL at 08:49

## 2017-09-12 RX ADMIN — DABIGATRAN ETEXILATE MESYLATE 75 MG: 75 CAPSULE ORAL at 08:49

## 2017-09-12 RX ADMIN — DOCUSATE SODIUM AND SENNOSIDES 2 TABLET: 8.6; 5 TABLET, FILM COATED ORAL at 08:49

## 2017-09-12 ASSESSMENT — PAIN SCALES - GENERAL: PAINLEVEL_OUTOF10: 0

## 2017-09-12 ASSESSMENT — LIFESTYLE VARIABLES: EVER_SMOKED: YES

## 2017-09-12 NOTE — DISCHARGE PLANNING
Medical Social Work    Referral: Pt discussed at IDT rounds this AM.    Intervention: Pt accepted by [facility] and can d.c today.    Plan: KLARISSA Contreras faxed transport form to [facility] requesting transport set up for 1300 or later.  KLARISSA preparing COBRA and will notify pt, pt's family and medical staff once time has been confirmed.

## 2017-09-12 NOTE — FLOWSHEET NOTE
09/12/17 0726   Interdisciplinary Plan of Care-Goals (Indications)   Hyperinflation Protocol Indications Atelectasis Documented by Chest X-Ray   Interdisciplinary Plan of Care-Outcomes    Hyperinflation Protocol Goals/Outcome Improvement in Repeat CXR   PEP/CPT Group   PEP/CPT/Airway Clearance Therapy Yes   #PEP/CPT (Manual) Subsequent Subsequent   PEP/CPT Method Positive Airway Pressure Device   Pressure 10   Date Disposable Equipment Last Changed 09/11/17   Date Disposable Equipment Next Change Due (Q 30 Days) 10/11/17   Respiratory WDL   Respiratory (WDL) X   Chest Exam   Respiration 20   Pulse 68   Breath Sounds   Pre/Post Intervention Post Intervention Assessment   RUL Breath Sounds Clear   RML Breath Sounds Crackles;Diminished   RLL Breath Sounds Crackles;Diminished   SARA Breath Sounds Crackles   LLL Breath Sounds Crackles;Diminished   Oximetry   #Pulse Oximetry (Single Determination) Yes   Oxygen   Pulse Oximetry 94 %   O2 (LPM) 3   O2 Daily Delivery Respiratory  Nasal Cannula

## 2017-09-12 NOTE — DISCHARGE PLANNING
KLARISSA Contreras received a call from [facility] stating [facility] will  at 1630 today.  KLARISSA informed bs RN Fatimah and pt's son, Fabian.  JU left on chart.

## 2017-09-12 NOTE — DISCHARGE PLANNING
[facility] received a transport form to transfer the patient to [facility] today. [facility] called [facility] for bed availability. [facility] left a Message for the [facility] Admissions team.

## 2017-09-12 NOTE — PROGRESS NOTES
Seen by MD at bedside, smith catheter removed as ordered, lasix D/PATTI. Possible transfer to [facility] today.

## 2017-09-12 NOTE — RESPIRATORY CARE
COPD EDUCATION by COPD CLINICAL EDUCATOR  9/12/2017 at 7:19 AM by Kasandra Cleary     Patient reviewed by COPD education team. Patient does not qualify for COPD program. Dementia.

## 2017-09-12 NOTE — FLOWSHEET NOTE
09/12/17 1106   Interdisciplinary Plan of Care-Goals (Indications)   Hyperinflation Protocol Indications Atelectasis Documented by Chest X-Ray   Interdisciplinary Plan of Care-Outcomes    Hyperinflation Protocol Goals/Outcome Improvement in Repeat CXR   PEP/CPT Group   PEP/CPT/Airway Clearance Therapy Yes   #PEP/CPT (Manual) Subsequent Subsequent   PEP/CPT Method Positive Airway Pressure Device   Pressure 10   Respiratory WDL   Respiratory (WDL) X   Chest Exam   Respiration 20   Pulse 64   Breath Sounds   Pre/Post Intervention Pre Intervention Assessment   RUL Breath Sounds Clear   RML Breath Sounds Crackles;Diminished   RLL Breath Sounds Crackles;Diminished   SARA Breath Sounds Crackles   LLL Breath Sounds Crackles;Diminished   Oximetry   #Pulse Oximetry (Single Determination) Yes   Oxygen   Pulse Oximetry 93 %   O2 (LPM) 3   O2 Daily Delivery Respiratory  Nasal Cannula

## 2017-09-12 NOTE — PROGRESS NOTES
Pulmonary Critical Care Progress Note        Chief Complaint: Shortness of breath and hypoxemia.    History of Present Illness: 89 y.o. male discharged yesterday after treatment for hypoxemic respiratory insufficiency and readmitted several hours later from the skilled nursing facility with increasing shortness of breath and hypoxemia requiring higher flows are supplemental oxygen.    ROS:  Respiratory: positive shortness of breath, Cardiac: positive chest pain, GI: negative nausea.  All other systems negative.    Interval Events:  24 hour interval history reviewed.  He is comfortable at rest without overt dyspnea low-flow oxygen nasal cannula, currently down to 2.5 L/m. He does have some pleuritic migratory chest pain that is better controlled today and troponin I levels have been normal.. He is hemodynamically stable and is in a regular rhythm, consistent with his pacemaker function. He has a mild stable anemia with a hemoglobin of 12.5 g percent. Azotemia has improved with the serum creatinine down to 1.5 mg percent. The BNP level is a bit elevated at 240.    PFSH:  No change.    Respiratory:  Pulse Oximetry: 94 %  Exam: diminished but symmetrical bilateral breath sounds with basilar rales but no wheezing or egophony.  ImagingAvailable data reviewed.  The chest done today demonstrates cardiomegaly and stable density at the left base with a possible small left pleural effusion.     HemoDynamics:  Pulse: 62, Heart Rate (Monitored): 64  Blood Pressure : 126/65, NIBP: 135/73     Exam: regular rate and rhythm  Imaging: Available data reviewed.  The echocardiogram on September 5, 2017 demonstrated preserved left ventricular systolic function with an estimated ejection fraction of 70%. There was mild mitral regurgitation with a severely dilated left atrium and a moderately dilated right ventricle with an estimated right ventricular systolic pressure of 40-55 mmHg.  Recent Labs      09/11/17   0059  09/11/17   0710    TROPONINI  0.02  0.03   BNPBTYPENAT  211*  240*       Neuro:  GCS    Exam: no focal deficits noted, alert but intermittently confused.  Imaging: None - Reviewed    Fluids:  Intake/Output       17 - 09/10/17 0659 (Not Admitted) 09/10/17 0700 - 17 0659 (Not Admitted) 17 07 - 17 0659      1900-0659 Total 1900-0659 Total 8011-1095 4385-3676 Total       Intake    P.O.  --  -- --  --  -- --  360  -- 360    P.O. -- -- -- -- -- -- 360 -- 360    Total Intake -- -- -- -- -- -- 360 -- 360       Output    Stool  --  -- --  --  -- --  --  -- --    Number of Times Stooled -- -- -- -- 1 x 1 x -- -- --    Total Output -- -- -- -- -- -- -- -- --       Net I/O     -- -- -- -- -- -- 360 -- 360        Weight: 70 kg (154 lb 5.2 oz)  Recent Labs      09/10/17   0350  09/11/17   0059   SODIUM  133*  136   POTASSIUM  3.6  4.0   CHLORIDE  101  103   CO2  28  24   BUN  47*  45*   CREATININE  1.83*  1.53*   MAGNESIUM   --   1.7   PHOSPHORUS   --   2.6   CALCIUM  7.9*  7.9*       GI/Nutrition:  Exam: abdomen is soft and non-tender  Imaging: Available data reviewed  taking PO  Liver Function  Recent Labs      09/10/17   0350  09/11/17   0059   ALTSGPT   --   13   ASTSGOT   --   17   ALKPHOSPHAT   --   98   TBILIRUBIN   --   1.1   GLUCOSE  131*  115*       Heme:  Recent Labs      09/10/17   0350  09/11/17   0059   RBC  4.09*  3.84*   HEMOGLOBIN  13.2*  12.5*   HEMATOCRIT  38.4*  36.1*   PLATELETCT  185  178   PROTHROMBTM   --   17.3*   APTT   --   44.2*   INR   --   1.44*       Infectious Disease:  Temp  Av.6 °C (97.9 °F)  Min: 36.5 °C (97.7 °F)  Max: 36.8 °C (98.2 °F)  Micro: no new positive cultures  Recent Labs      09/10/17   0350  17   0059   WBC  10.9*  11.8*   NEUTSPOLYS   --   80.70*   LYMPHOCYTES   --   10.40*   MONOCYTES   --   5.30   EOSINOPHILS   --   2.60   BASOPHILS   --   0.30   ASTSGOT   --   17   ALTSGPT   --   13   ALKPHOSPHAT   --   98   TBILIRUBIN   --   1.1      Current Facility-Administered Medications   Medication Dose Frequency Provider Last Rate Last Dose   • dabigatran (PRADAXA) capsule 75 mg  75 mg BID AFIA AlexandreOMercy   75 mg at 09/11/17 0933   • escitalopram (LEXAPRO) tablet 10 mg  10 mg DAILY MIKE Alexandre.O.   10 mg at 09/11/17 0933   • memantine (NAMENDA) tablet 5 mg  5 mg DAILY MIKE Alexandre.O.   5 mg at 09/11/17 0933   • omeprazole (PRILOSEC) capsule 20 mg  20 mg DAILY MIKE Alexandre.O.   20 mg at 09/11/17 0933   • oxycodone immediate-release (ROXICODONE) tablet 5 mg  5 mg Q8HRS PRN MIKE Alexandre.O.   5 mg at 09/11/17 0413   • tamsulosin (FLOMAX) capsule 0.4 mg  0.4 mg AFTER BREAKFAST AFIA AlexandreOMercy   0.4 mg at 09/11/17 0934   • senna-docusate (PERICOLACE or SENOKOT S) 8.6-50 MG per tablet 2 Tab  2 Tab BID AFIA AlexandreO.   2 Tab at 09/11/17 0933    And   • polyethylene glycol/lytes (MIRALAX) PACKET 1 Packet  1 Packet QDAY PRN Yanelis Champagne D.O.        And   • magnesium hydroxide (MILK OF MAGNESIA) suspension 30 mL  30 mL QDAY PRN Yanelis Champagne D.O.        And   • bisacodyl (DULCOLAX) suppository 10 mg  10 mg QDAY PRN MIKE Alexandre.DEEPA.       • Respiratory Care per Protocol   Continuous RT Yanelis Champagne D.O.       • acetaminophen (TYLENOL) tablet 650 mg  650 mg Q6HRS PRN MIKE Alexandre.OMercy   650 mg at 09/11/17 0413   • furosemide (LASIX) injection 40 mg  40 mg Q DAY MIKE Alexandre.O.   40 mg at 09/11/17 0933   • ondansetron (ZOFRAN) syringe/vial injection 4 mg  4 mg Q4HRS PRN Yanelis Champagne, D.O.       • ondansetron (ZOFRAN ODT) dispertab 4 mg  4 mg Q4HRS PRN Yanelis Champagne D.O.       • haloperidol lactate (HALDOL) injection 5 mg  5 mg Q4HRS PRN Yanelis Champagne D.O.       • guaifenesin dextromethorphan (ROBITUSSIN DM) 100-10 MG/5ML syrup 10 mL  10 mL Q6HRS PRN Yanelis Champagne D.O.         Last reviewed on 9/11/2017  3:28 AM by Leslie Jackson RVALERIE    Quality  Measures:  Core Measures & Quality Metrics    Problems:  1. Acute on chronic  hypoxemic respiratory insufficiency. His worsening gas exchange last night may have been related to atelectasis and perhaps mild pulmonary edema but is improved today with oxygen requirements back to baseline levels.    2. Abnormal chest radiogram. The findings suggest stable hypoventilatory atelectasis and perhaps pulmonary edema. There is no fever significant leukocytosis or other symptoms to suggest pneumonia.    3. Possible heart failure with preserved systolic function. The echocardiogram demonstrates reserved left ventricular systolic function but does show a significantly dilated left atrium although the mitral regurgitation was assessed as mild. There is significant pulmonary hypertension.    4. Chronic kidney disease, stage III.    5. Systemic arterial hypertension, controlled    6. History of paroxysmal atrial fibrillation now in a stable paced rhythm with permanent transvenous pacemaker.    7. Mild stable anemia without evidence of ongoing blood loss.    8. Dementia, without new focal neurologic findings.    Plan:  Continue titrate oxygen, respiratory protocols, incentive spirometry as tolerated and mobilization. Discussed with respiratory therapy and we will initiate positive pressure therapy designed to reduce hypoventilatory atelectasis.    Gentle diuresis, monitoring renal function with ongoing electrolyte management. Avoid nephrotoxins.    Continue blood pressure control and prophylaxis.    Discussed with nursing, respiratory therapy, hospitalist team and with the patient's son at the bedside.

## 2017-09-12 NOTE — DISCHARGE SUMMARY
CHIEF COMPLAINT ON ADMISSION  Chief Complaint   Patient presents with   • Shortness of Breath     Pt BIB EMS, c/o SOB, O2 saturation 82% on 5L at [facility]. Pt recently d/c from hospital today. Had fall Monday, but CT showed no rib fractures.        CODE STATUS  Full Code    HPI & HOSPITAL COURSE  This is a 89 y.o. male here with hypoxia. He was recently discharged from the hospital and had a fall previously. No rib fractures were found on CT scan but the patient was found to have hypoventilation and atelectasis. Pulmonary medicine Dr. Macario did see the patient and he was treated with iv lasix as well as incentive spirometry. His hypoxia improved but he continued to require 3 liters oxygen. He had difficulty performing incentive spirometry due to his dementia but his respiratory effort did improve. Creatinine remained elevated near his baseline of 1.9.    Therefore, he is discharged in fair and stable condition with close outpatient follow-up.    SPECIFIC OUTPATIENT FOLLOW-UP  Primary care provider in 2-4 weeks    DISCHARGE PROBLEM LIST  Principal Problem:    Acute respiratory failure with hypoxia (CMS-HCC) POA: Yes  Active Problems:    Dementia POA: Yes    Acute diastolic CHF (congestive heart failure) (CMS-HCC) POA: Yes    Paroxysmal atrial fibrillation (CMS-HCC) POA: Yes    HTN (hypertension) POA: Yes    Chronic kidney disease, stage III (moderate) POA: Yes    Agitation POA: Unknown  Resolved Problems:    Congestive heart failure, acute (CMS-HCC) POA: Unknown      FOLLOW UP  No future appointments.  No follow-up provider specified.    MEDICATIONS ON DISCHARGE   Jie Nunez   Home Medication Instructions NEEL:[id number]    Printed on:09/12/17 9272   Medication Information                      ascorbic acid (ASCORBIC ACID) 500 MG Tab  Take 500 mg by mouth every day.             carbamide peroxide (DEBROX) 6.5 % Solution  Place 5 Drops in ear 2 times a day.             dabigatran (PRADAXA) 75 MG Cap capsule  Take  75 mg by mouth 2 Times a Day.             docusate sodium (COLACE) 100 MG Cap  Take 100 mg by mouth 2 times a day.             escitalopram (LEXAPRO) 10 MG Tab  Take 1 Tab by mouth every day.             memantine (NAMENDA) 5 MG Tab  Take 5 mg by mouth every day.             omeprazole (PRILOSEC) 20 MG delayed-release capsule  Take 1 Cap by mouth every day.             oxycodone immediate-release (ROXICODONE) 5 MG Tab  Take 1 Tab by mouth every 8 hours as needed.             tamsulosin (FLOMAX) 0.4 MG capsule  Take 1 Cap by mouth ONE-HALF HOUR AFTER BREAKFAST.             vitamin D, Ergocalciferol, (DRISDOL) 95644 UNITS Cap capsule  Take 1 Cap by mouth every 7 days.                 DIET  Orders Placed This Encounter   Procedures   • Diet Order     Standing Status:   Standing     Number of Occurrences:   1     Order Specific Question:   Diet:     Answer:   Cardiac [6]     Order Specific Question:   Texture/Fiber modifications:     Answer:   Dysphagia 2(Pureed/Chopped)specify fluid consistency(question 6) [2]       ACTIVITY  As tolerated and directed by skilled nursing.  Weight bearing as tolerated      CONSULTATIONS  None    PROCEDURES  Chest xray showed:   1.  Stable cardiomegaly.    2.  Stable left basilar opacification.    3.  Blunted left costophrenic angle suggests a small pleural effusion.    LABORATORY  Lab Results   Component Value Date/Time    SODIUM 136 09/12/2017 12:20 AM    POTASSIUM 4.2 09/12/2017 12:20 AM    CHLORIDE 102 09/12/2017 12:20 AM    CO2 25 09/12/2017 12:20 AM    GLUCOSE 121 (H) 09/12/2017 12:20 AM    BUN 48 (H) 09/12/2017 12:20 AM    CREATININE 1.95 (H) 09/12/2017 12:20 AM        Lab Results   Component Value Date/Time    WBC 11.2 (H) 09/12/2017 12:20 AM    HEMOGLOBIN 13.3 (L) 09/12/2017 12:20 AM    HEMATOCRIT 39.1 (L) 09/12/2017 12:20 AM    PLATELETCT 192 09/12/2017 12:20 AM        Total time of the discharge process exceeds 45 minutes   The patient improved faster than expected given the  extent of his hypoxia

## 2017-09-12 NOTE — CARE PLAN
Problem: Safety  Goal: Will remain free from injury  Outcome: PROGRESSING AS EXPECTED    Intervention: Provide assistance with mobility  Generalized weakness and confusion, fall risk, precaution observed, bed/chair alarm on at all times.      Problem: Knowledge Deficit  Goal: Knowledge of disease process/condition, treatment plan, diagnostic tests, and medications will improve  Outcome: PROGRESSING AS EXPECTED    Intervention: Explain information regarding disease process/condition, treatment plan, diagnostic tests, and medications and document in education  Updated with plan of care, family at bedside, luis D/CD, lasix d/cd, possible [facility].

## 2017-09-12 NOTE — DISCHARGE PLANNING
Transportation has been arranged to transfer the patient to [facility] today at 1630 via the [facility] van. SW on floor has been notified.

## 2017-09-12 NOTE — PROGRESS NOTES
Report received from ANA Martinez, resting comfortably , POC discussed. Bed alarm on at all times.

## 2017-09-12 NOTE — FLOWSHEET NOTE
09/11/17 1948   Interdisciplinary Plan of Care-Goals (Indications)   Hyperinflation Protocol Indications Atelectasis Documented by Chest X-Ray   Interdisciplinary Plan of Care-Outcomes    Hyperinflation Protocol Goals/Outcome Improvement in Repeat CXR   PEP/CPT Group   PEP/CPT/Airway Clearance Therapy Yes   #PEP/CPT (Manual) Subsequent Subsequent   PEP/CPT Method Positive Airway Pressure Device   CPT Settings Yes   Pressure 15   Respiratory WDL   Respiratory (WDL) X   Chest Exam   Respiration 18   Heart Rate (Monitored) 87   Breath Sounds   Pre/Post Intervention Pre Intervention Assessment   RUL Breath Sounds Clear   RML Breath Sounds Clear   RLL Breath Sounds Crackles   SARA Breath Sounds Clear   LLL Breath Sounds Diminished   Secretions   Cough Congested   Sputum Amount Scant   Sputum Color Clear   Sputum Consistency Thin   Oximetry   #Pulse Oximetry (Single Determination) Yes   Oxygen   Home O2 Use Prior To Admission? Yes   Pulse Oximetry 96 %   O2 (LPM) 2.5   O2 Daily Delivery Respiratory  Nasal Cannula

## 2017-11-14 NOTE — ADDENDUM NOTE
Encounter addended by: Solange Shah on: 11/14/2017  1:57 PM<BR>    Actions taken: Flowsheet accepted

## 2017-12-04 ENCOUNTER — HOSPITAL ENCOUNTER (OUTPATIENT)
Dept: LAB | Facility: MEDICAL CENTER | Age: 82
End: 2017-12-04
Attending: INTERNAL MEDICINE
Payer: MEDICARE

## 2017-12-04 LAB
ANION GAP SERPL CALC-SCNC: 8 MMOL/L (ref 0–11.9)
BUN SERPL-MCNC: 30 MG/DL (ref 8–22)
CALCIUM SERPL-MCNC: 9.4 MG/DL (ref 8.5–10.5)
CHLORIDE SERPL-SCNC: 100 MMOL/L (ref 96–112)
CO2 SERPL-SCNC: 28 MMOL/L (ref 20–33)
CREAT SERPL-MCNC: 1.7 MG/DL (ref 0.5–1.4)
GFR SERPL CREATININE-BSD FRML MDRD: 38 ML/MIN/1.73 M 2
GLUCOSE SERPL-MCNC: 104 MG/DL (ref 65–99)
POTASSIUM SERPL-SCNC: 4.2 MMOL/L (ref 3.6–5.5)
SODIUM SERPL-SCNC: 136 MMOL/L (ref 135–145)

## 2017-12-04 PROCEDURE — 36415 COLL VENOUS BLD VENIPUNCTURE: CPT

## 2017-12-04 PROCEDURE — 80048 BASIC METABOLIC PNL TOTAL CA: CPT

## 2018-01-11 ENCOUNTER — TELEPHONE (OUTPATIENT)
Dept: CARDIOLOGY | Facility: MEDICAL CENTER | Age: 83
End: 2018-01-11

## 2018-01-11 NOTE — TELEPHONE ENCOUNTER
Per Dr Gretta Castillo please schedule pt as a new pt with her and pacemaker check in 4-8 weeks, msg forwarded to schedulers.    Noted pt is scheduled with Dr Castillo on 2/27/18 0145pm as well as pacemaker check on 2/27/18 at 0115pm

## 2018-02-09 ENCOUNTER — APPOINTMENT (OUTPATIENT)
Dept: RADIOLOGY | Facility: MEDICAL CENTER | Age: 83
End: 2018-02-09
Attending: EMERGENCY MEDICINE
Payer: MEDICARE

## 2018-02-09 ENCOUNTER — HOSPITAL ENCOUNTER (OUTPATIENT)
Facility: MEDICAL CENTER | Age: 83
End: 2018-02-10
Attending: EMERGENCY MEDICINE | Admitting: INTERNAL MEDICINE
Payer: MEDICARE

## 2018-02-09 ENCOUNTER — RESOLUTE PROFESSIONAL BILLING HOSPITAL PROF FEE (OUTPATIENT)
Dept: HOSPITALIST | Facility: MEDICAL CENTER | Age: 83
End: 2018-02-09
Payer: MEDICARE

## 2018-02-09 ENCOUNTER — APPOINTMENT (OUTPATIENT)
Dept: RADIOLOGY | Facility: MEDICAL CENTER | Age: 83
End: 2018-02-09
Attending: INTERNAL MEDICINE
Payer: MEDICARE

## 2018-02-09 DIAGNOSIS — G45.9 TRANSIENT CEREBRAL ISCHEMIA, UNSPECIFIED TYPE: ICD-10-CM

## 2018-02-09 DIAGNOSIS — R40.4 TRANSIENT ALTERATION OF AWARENESS: ICD-10-CM

## 2018-02-09 PROBLEM — R93.89 ABNORMAL CAT SCAN: Status: ACTIVE | Noted: 2018-02-09

## 2018-02-09 LAB
ALBUMIN SERPL BCP-MCNC: 3.6 G/DL (ref 3.2–4.9)
ALBUMIN/GLOB SERPL: 1.2 G/DL
ALP SERPL-CCNC: 107 U/L (ref 30–99)
ALT SERPL-CCNC: 6 U/L (ref 2–50)
ANION GAP SERPL CALC-SCNC: 8 MMOL/L (ref 0–11.9)
APPEARANCE UR: CLEAR
APTT PPP: 67.3 SEC (ref 24.7–36)
AST SERPL-CCNC: 13 U/L (ref 12–45)
BASOPHILS # BLD AUTO: 0.9 % (ref 0–1.8)
BASOPHILS # BLD: 0.06 K/UL (ref 0–0.12)
BILIRUB SERPL-MCNC: 1.1 MG/DL (ref 0.1–1.5)
BILIRUB UR QL STRIP.AUTO: NEGATIVE
BUN SERPL-MCNC: 31 MG/DL (ref 8–22)
CALCIUM SERPL-MCNC: 9 MG/DL (ref 8.5–10.5)
CHLORIDE SERPL-SCNC: 102 MMOL/L (ref 96–112)
CO2 SERPL-SCNC: 24 MMOL/L (ref 20–33)
COLOR UR: YELLOW
CREAT SERPL-MCNC: 1.75 MG/DL (ref 0.5–1.4)
EOSINOPHIL # BLD AUTO: 0.23 K/UL (ref 0–0.51)
EOSINOPHIL NFR BLD: 3.3 % (ref 0–6.9)
ERYTHROCYTE [DISTWIDTH] IN BLOOD BY AUTOMATED COUNT: 46.4 FL (ref 35.9–50)
EST. AVERAGE GLUCOSE BLD GHB EST-MCNC: 117 MG/DL
GLOBULIN SER CALC-MCNC: 2.9 G/DL (ref 1.9–3.5)
GLUCOSE SERPL-MCNC: 100 MG/DL (ref 65–99)
GLUCOSE UR STRIP.AUTO-MCNC: NEGATIVE MG/DL
HBA1C MFR BLD: 5.7 % (ref 0–5.6)
HCT VFR BLD AUTO: 40.2 % (ref 42–52)
HGB BLD-MCNC: 13.9 G/DL (ref 14–18)
IMM GRANULOCYTES # BLD AUTO: 0.02 K/UL (ref 0–0.11)
IMM GRANULOCYTES NFR BLD AUTO: 0.3 % (ref 0–0.9)
INR PPP: 1.68 (ref 0.87–1.13)
KETONES UR STRIP.AUTO-MCNC: NEGATIVE MG/DL
LEUKOCYTE ESTERASE UR QL STRIP.AUTO: NEGATIVE
LYMPHOCYTES # BLD AUTO: 1.11 K/UL (ref 1–4.8)
LYMPHOCYTES NFR BLD: 15.9 % (ref 22–41)
MAGNESIUM SERPL-MCNC: 1.6 MG/DL (ref 1.5–2.5)
MCH RBC QN AUTO: 32.6 PG (ref 27–33)
MCHC RBC AUTO-ENTMCNC: 34.6 G/DL (ref 33.7–35.3)
MCV RBC AUTO: 94.1 FL (ref 81.4–97.8)
MICRO URNS: NORMAL
MONOCYTES # BLD AUTO: 0.51 K/UL (ref 0–0.85)
MONOCYTES NFR BLD AUTO: 7.3 % (ref 0–13.4)
NEUTROPHILS # BLD AUTO: 5.07 K/UL (ref 1.82–7.42)
NEUTROPHILS NFR BLD: 72.3 % (ref 44–72)
NITRITE UR QL STRIP.AUTO: NEGATIVE
NRBC # BLD AUTO: 0 K/UL
NRBC BLD-RTO: 0 /100 WBC
PH UR STRIP.AUTO: 6 [PH]
PLATELET # BLD AUTO: 127 K/UL (ref 164–446)
PMV BLD AUTO: 10.3 FL (ref 9–12.9)
POTASSIUM SERPL-SCNC: 4.1 MMOL/L (ref 3.6–5.5)
PROT SERPL-MCNC: 6.5 G/DL (ref 6–8.2)
PROT UR QL STRIP: NEGATIVE MG/DL
PROTHROMBIN TIME: 19.5 SEC (ref 12–14.6)
RBC # BLD AUTO: 4.27 M/UL (ref 4.7–6.1)
RBC UR QL AUTO: NEGATIVE
SODIUM SERPL-SCNC: 134 MMOL/L (ref 135–145)
SP GR UR STRIP.AUTO: 1.03
TROPONIN I SERPL-MCNC: 0.02 NG/ML (ref 0–0.04)
TSH SERPL DL<=0.005 MIU/L-ACNC: 1.05 UIU/ML (ref 0.38–5.33)
UROBILINOGEN UR STRIP.AUTO-MCNC: 0.2 MG/DL
WBC # BLD AUTO: 7 K/UL (ref 4.8–10.8)

## 2018-02-09 PROCEDURE — G0378 HOSPITAL OBSERVATION PER HR: HCPCS

## 2018-02-09 PROCEDURE — G8988 SELF CARE GOAL STATUS: HCPCS | Mod: CI

## 2018-02-09 PROCEDURE — 92610 EVALUATE SWALLOWING FUNCTION: CPT

## 2018-02-09 PROCEDURE — 81003 URINALYSIS AUTO W/O SCOPE: CPT

## 2018-02-09 PROCEDURE — 84484 ASSAY OF TROPONIN QUANT: CPT

## 2018-02-09 PROCEDURE — 85025 COMPLETE CBC W/AUTO DIFF WBC: CPT

## 2018-02-09 PROCEDURE — 93005 ELECTROCARDIOGRAM TRACING: CPT | Performed by: EMERGENCY MEDICINE

## 2018-02-09 PROCEDURE — 97162 PT EVAL MOD COMPLEX 30 MIN: CPT

## 2018-02-09 PROCEDURE — 700117 HCHG RX CONTRAST REV CODE 255: Performed by: EMERGENCY MEDICINE

## 2018-02-09 PROCEDURE — 80053 COMPREHEN METABOLIC PANEL: CPT

## 2018-02-09 PROCEDURE — 97165 OT EVAL LOW COMPLEX 30 MIN: CPT

## 2018-02-09 PROCEDURE — 99204 OFFICE O/P NEW MOD 45 MIN: CPT | Performed by: INTERNAL MEDICINE

## 2018-02-09 PROCEDURE — G8996 SWALLOW CURRENT STATUS: HCPCS | Mod: CK

## 2018-02-09 PROCEDURE — 84443 ASSAY THYROID STIM HORMONE: CPT

## 2018-02-09 PROCEDURE — 70498 CT ANGIOGRAPHY NECK: CPT

## 2018-02-09 PROCEDURE — 83036 HEMOGLOBIN GLYCOSYLATED A1C: CPT

## 2018-02-09 PROCEDURE — A9270 NON-COVERED ITEM OR SERVICE: HCPCS | Performed by: INTERNAL MEDICINE

## 2018-02-09 PROCEDURE — G8980 MOBILITY D/C STATUS: HCPCS | Mod: CI

## 2018-02-09 PROCEDURE — 70450 CT HEAD/BRAIN W/O DYE: CPT

## 2018-02-09 PROCEDURE — G8997 SWALLOW GOAL STATUS: HCPCS | Mod: CH

## 2018-02-09 PROCEDURE — 85610 PROTHROMBIN TIME: CPT

## 2018-02-09 PROCEDURE — G8987 SELF CARE CURRENT STATUS: HCPCS | Mod: CI

## 2018-02-09 PROCEDURE — 0042T CT-CEREBRAL PERFUSION ANALYSIS: CPT

## 2018-02-09 PROCEDURE — 85730 THROMBOPLASTIN TIME PARTIAL: CPT

## 2018-02-09 PROCEDURE — 99285 EMERGENCY DEPT VISIT HI MDM: CPT

## 2018-02-09 PROCEDURE — 83735 ASSAY OF MAGNESIUM: CPT

## 2018-02-09 PROCEDURE — 700102 HCHG RX REV CODE 250 W/ 637 OVERRIDE(OP): Performed by: INTERNAL MEDICINE

## 2018-02-09 PROCEDURE — G8978 MOBILITY CURRENT STATUS: HCPCS | Mod: CI

## 2018-02-09 PROCEDURE — 70496 CT ANGIOGRAPHY HEAD: CPT

## 2018-02-09 PROCEDURE — G8989 SELF CARE D/C STATUS: HCPCS | Mod: CI

## 2018-02-09 PROCEDURE — 71045 X-RAY EXAM CHEST 1 VIEW: CPT

## 2018-02-09 PROCEDURE — G8979 MOBILITY GOAL STATUS: HCPCS | Mod: CI

## 2018-02-09 RX ORDER — DABIGATRAN ETEXILATE 75 MG/1
75 CAPSULE ORAL 2 TIMES DAILY
Status: DISCONTINUED | OUTPATIENT
Start: 2018-02-09 | End: 2018-02-10 | Stop reason: HOSPADM

## 2018-02-09 RX ORDER — AMOXICILLIN 250 MG
2 CAPSULE ORAL 2 TIMES DAILY
Status: DISCONTINUED | OUTPATIENT
Start: 2018-02-09 | End: 2018-02-10 | Stop reason: HOSPADM

## 2018-02-09 RX ORDER — ASPIRIN 300 MG/1
300 SUPPOSITORY RECTAL DAILY
Status: DISCONTINUED | OUTPATIENT
Start: 2018-02-09 | End: 2018-02-10

## 2018-02-09 RX ORDER — OMEPRAZOLE 20 MG/1
20 CAPSULE, DELAYED RELEASE ORAL DAILY
Status: DISCONTINUED | OUTPATIENT
Start: 2018-02-10 | End: 2018-02-10 | Stop reason: HOSPADM

## 2018-02-09 RX ORDER — HYDROCHLOROTHIAZIDE 25 MG/1
25 TABLET ORAL DAILY
Status: ON HOLD | COMMUNITY
End: 2018-02-10

## 2018-02-09 RX ORDER — LOSARTAN POTASSIUM 50 MG/1
50 TABLET ORAL DAILY
COMMUNITY
End: 2018-02-27 | Stop reason: SDUPTHER

## 2018-02-09 RX ORDER — TAMSULOSIN HYDROCHLORIDE 0.4 MG/1
0.4 CAPSULE ORAL
Status: DISCONTINUED | OUTPATIENT
Start: 2018-02-09 | End: 2018-02-10 | Stop reason: HOSPADM

## 2018-02-09 RX ORDER — ATORVASTATIN CALCIUM 40 MG/1
40 TABLET, FILM COATED ORAL EVERY EVENING
Status: DISCONTINUED | OUTPATIENT
Start: 2018-02-09 | End: 2018-02-10 | Stop reason: HOSPADM

## 2018-02-09 RX ORDER — ERGOCALCIFEROL 1.25 MG/1
50000 CAPSULE ORAL
Status: DISCONTINUED | OUTPATIENT
Start: 2018-02-16 | End: 2018-02-10 | Stop reason: HOSPADM

## 2018-02-09 RX ORDER — ACETAMINOPHEN 325 MG/1
650 TABLET ORAL EVERY 6 HOURS PRN
Status: DISCONTINUED | OUTPATIENT
Start: 2018-02-09 | End: 2018-02-10 | Stop reason: HOSPADM

## 2018-02-09 RX ORDER — DABIGATRAN ETEXILATE 75 MG/1
75 CAPSULE ORAL 2 TIMES DAILY
COMMUNITY
End: 2018-02-27 | Stop reason: SDUPTHER

## 2018-02-09 RX ORDER — ERGOCALCIFEROL 1.25 MG/1
50000 CAPSULE ORAL
COMMUNITY
End: 2018-09-10

## 2018-02-09 RX ORDER — ESCITALOPRAM OXALATE 10 MG/1
10 TABLET ORAL DAILY
Status: DISCONTINUED | OUTPATIENT
Start: 2018-02-10 | End: 2018-02-10 | Stop reason: HOSPADM

## 2018-02-09 RX ORDER — ASPIRIN 325 MG
325 TABLET ORAL DAILY
Status: DISCONTINUED | OUTPATIENT
Start: 2018-02-09 | End: 2018-02-10

## 2018-02-09 RX ORDER — POLYETHYLENE GLYCOL 3350 17 G/17G
1 POWDER, FOR SOLUTION ORAL
Status: DISCONTINUED | OUTPATIENT
Start: 2018-02-09 | End: 2018-02-10 | Stop reason: HOSPADM

## 2018-02-09 RX ORDER — BISACODYL 10 MG
10 SUPPOSITORY, RECTAL RECTAL
Status: DISCONTINUED | OUTPATIENT
Start: 2018-02-09 | End: 2018-02-10 | Stop reason: HOSPADM

## 2018-02-09 RX ORDER — ASPIRIN 81 MG/1
324 TABLET, CHEWABLE ORAL DAILY
Status: DISCONTINUED | OUTPATIENT
Start: 2018-02-09 | End: 2018-02-10

## 2018-02-09 RX ORDER — ONDANSETRON 2 MG/ML
4 INJECTION INTRAMUSCULAR; INTRAVENOUS EVERY 4 HOURS PRN
Status: DISCONTINUED | OUTPATIENT
Start: 2018-02-09 | End: 2018-02-10 | Stop reason: HOSPADM

## 2018-02-09 RX ORDER — ONDANSETRON 4 MG/1
4 TABLET, ORALLY DISINTEGRATING ORAL EVERY 4 HOURS PRN
Status: DISCONTINUED | OUTPATIENT
Start: 2018-02-09 | End: 2018-02-10 | Stop reason: HOSPADM

## 2018-02-09 RX ADMIN — STANDARDIZED SENNA CONCENTRATE AND DOCUSATE SODIUM 2 TABLET: 8.6; 5 TABLET, FILM COATED ORAL at 20:17

## 2018-02-09 RX ADMIN — IOHEXOL 100 ML: 350 INJECTION, SOLUTION INTRAVENOUS at 10:27

## 2018-02-09 RX ADMIN — DABIGATRAN ETEXILATE MESYLATE 75 MG: 75 CAPSULE ORAL at 15:10

## 2018-02-09 RX ADMIN — DABIGATRAN ETEXILATE MESYLATE 75 MG: 75 CAPSULE ORAL at 20:17

## 2018-02-09 RX ADMIN — ASPIRIN 325 MG: 325 TABLET, COATED ORAL at 15:10

## 2018-02-09 RX ADMIN — TAMSULOSIN HYDROCHLORIDE 0.4 MG: 0.4 CAPSULE ORAL at 15:10

## 2018-02-09 RX ADMIN — IOHEXOL 40 ML: 350 INJECTION, SOLUTION INTRAVENOUS at 10:14

## 2018-02-09 RX ADMIN — ATORVASTATIN CALCIUM 40 MG: 40 TABLET, FILM COATED ORAL at 20:18

## 2018-02-09 ASSESSMENT — GAIT ASSESSMENTS
GAIT LEVEL OF ASSIST: STAND BY ASSIST
DISTANCE (FEET): 40
ASSISTIVE DEVICE: FRONT WHEEL WALKER

## 2018-02-09 ASSESSMENT — COGNITIVE AND FUNCTIONAL STATUS - GENERAL
SUGGESTED CMS G CODE MODIFIER DAILY ACTIVITY: CI
MOBILITY SCORE: 20
MOVING FROM LYING ON BACK TO SITTING ON SIDE OF FLAT BED: A LITTLE
DAILY ACTIVITIY SCORE: 23
SUGGESTED CMS G CODE MODIFIER MOBILITY: CJ
TURNING FROM BACK TO SIDE WHILE IN FLAT BAD: A LITTLE
HELP NEEDED FOR BATHING: A LITTLE
CLIMB 3 TO 5 STEPS WITH RAILING: A LITTLE
MOVING TO AND FROM BED TO CHAIR: A LITTLE

## 2018-02-09 ASSESSMENT — LIFESTYLE VARIABLES
ALCOHOL_USE: NO
EVER_SMOKED: YES

## 2018-02-09 ASSESSMENT — PAIN SCALES - GENERAL
PAINLEVEL_OUTOF10: 0

## 2018-02-09 ASSESSMENT — PATIENT HEALTH QUESTIONNAIRE - PHQ9
SUM OF ALL RESPONSES TO PHQ QUESTIONS 1-9: 0
SUM OF ALL RESPONSES TO PHQ9 QUESTIONS 1 AND 2: 0
1. LITTLE INTEREST OR PLEASURE IN DOING THINGS: NOT AT ALL
2. FEELING DOWN, DEPRESSED, IRRITABLE, OR HOPELESS: NOT AT ALL

## 2018-02-09 ASSESSMENT — ACTIVITIES OF DAILY LIVING (ADL): TOILETING: INDEPENDENT

## 2018-02-09 NOTE — PROGRESS NOTES
Surgery    CTA head/neck reviewed. No dissection of the ascending, arch, or descending aorta. The lower visualized portion of the descending aorta appears to have some chronic adherent plaque; unlikely dissection. CTA chest/abd/pelvis cancelled. We will follow this clinically and if there are no associated symptoms then further testing is probably not necessary.    About 50% right carotid stenosis. Left is less than 50% stenosis.    Junior Buchanan MD  General&Vascular Surgery  [facility]  Cell: [phone]  Office: [phone]

## 2018-02-09 NOTE — THERAPY
"Speech Language Therapy Clinical Swallow Evaluation completed.  Functional Status: Patient was seen on this date for a clinical swallow evaluation. The patient was pleasantly confused. Patient inconsistently followed directives to the oral [facility] exam.  Query left sided lingual debulking?  The patient had difficulty with lingual lateralization to the buccal cavity bilaterally, which appears secondary to his dementia. Patient's labial seal was weakened demonstrated inability to maintain intraoral pressure. Patient's volitional cough was minimally weak. Laryngeal excursion was complete upon palpation. PO intake consisted of ice chips, nectars, purees, pudding, mixed consistencies, and thin liquids. Patient's voice was wet/gurgly with ~50% of nectars and thins but was cleared when cued to volitional throat clear and then swallow. He demonstrated excessive mastication with the nectars, purees, pudding, and mixed consistencies up to 10-12 seconds. Patient with anterior mastication and small vertical jaw movements with the soft solids.  Thin liquids resulted in a noisy swallow and delayed initiation of swallow trigger.  Recommend a Dysphagia I, Nectar Thick Liquid diet with direct supervision and cues to \"quick\" swallow and volitional throat clear followed by second swallow.  SLP following.   Recommendations - Diet: Diet / Liquid Recommendation: Dysphagia I, Nectar Thick Liquid                          Strategies: Strict 1:1 feeding  and No Straws                          Medication Administration: Medication Administration : Crush all Medications in Puree  Plan of Care: Will benefit from Speech Therapy 3 times per week  Post-Acute Therapy: Discharge to home with outpatient or home health for additional skilled therapy services.    See \"Rehab Therapy-Acute\" Patient Summary Report for complete documentation.   "

## 2018-02-09 NOTE — H&P
Hospital Medicine History and Physical    Date of Service  2/9/2018    Chief Complaint  Chief Complaint   Patient presents with   • Possible Stroke     BIB EMS from [facility]. pt was found to be unresponsive beginning at 0900 for approximately 15 minutes. pt was sitting up in chair with eyes open, unable to speak.        History of Presenting Illness  89 y.o. male with history of atrial fibrillation, pacemaker, CHF, hypertension, prostate cancer, dementia, who presented 2/9/2018 with possible TIA.   She found to be unresponsive at 9 AM in his memory unit where he resides.   He was sitting in the chair, with eyes open, unable to speak for about 15 minutes. There is questionable left facial droop, and left upper and lower extremity weakness, reported by family.   Patient has baseline cognitive deficits and unable to provide coherent history . He is in his bed, denies any discomfort. He is oriented ×1-2. He denies any focal weakness, vision changes, swallowing changes, speech changes, headache, dizziness, bladder or bowel function changes.  According to his son-in-law, the presence of bedside, patient had similar episodes in the past.   Primary Care Physician  Thelma Cervantes M.D.    Consultants  neurology    Code Status  Full code    Review of Systems  ROS  Please see HPI, all other systems were reviewed and are negative (AMA/CMS criteria)       Past Medical History  Past Medical History:   Diagnosis Date   • Arrhythmia    • Atrial fibrillation (CMS-HCC)    • Cancer (CMS-HCC)     prostate   • Congestive heart failure (CMS-HCC)    • Hypertension        Surgical History  Past Surgical History:   Procedure Laterality Date   • CHOLECYSTECTOMY     • GYN SURGERY      leyda   • LUMBAR FUSION POSTERIOR     • OTHER CARDIAC SURGERY      A-fib   • OTHER CARDIAC SURGERY      chf   • OTHER ORTHOPEDIC SURGERY      lumbar lami   • SHOULDER ARTHROSCOPY         Medications  No current facility-administered medications on file prior  "to encounter.      Current Outpatient Prescriptions on File Prior to Encounter   Medication Sig Dispense Refill   • memantine (NAMENDA) 5 MG Tab Take 5 mg by mouth every evening.     • escitalopram (LEXAPRO) 10 MG Tab Take 1 Tab by mouth every day. 30 Tab 3   • omeprazole (PRILOSEC) 20 MG delayed-release capsule Take 1 Cap by mouth every day. 30 Cap 11   • tamsulosin (FLOMAX) 0.4 MG capsule Take 1 Cap by mouth ONE-HALF HOUR AFTER BREAKFAST. 30 Cap    • ascorbic acid (ASCORBIC ACID) 500 MG Tab Take 500 mg by mouth every day.         Family History  No family history on file.  Patient denies family history of heart disease, diabetes or cancer.  Social History  Social History   Substance Use Topics   • Smoking status: Never Smoker   • Smokeless tobacco: Never Used   • Alcohol use No      Comment: occaisiona       Allergies  Allergies   Allergen Reactions   • Beta Adrenergic Blockers Anaphylaxis     HR goes into teens, Cardiology wants him to never have another beta blocker   • Penicillins      Unknown reaction          Physical Exam  Laboratory   Hemodynamics  Temp (24hrs), Av.7 °C (98 °F), Min:36.7 °C (98 °F), Max:36.7 °C (98 °F)   Temperature: 36.7 °C (98 °F)  Pulse  Av.7  Min: 45  Max: 62 Heart Rate (Monitored): 60  Blood Pressure : 120/66, NIBP: 121/65      Respiratory      Respiration: 16, Pulse Oximetry: 99 %             Physical Exam  Vitals/ General Appearance:   Weight/BMI: Body mass index is 24.12 kg/m².  Blood pressure 120/66, pulse 60, temperature 36.7 °C (98 °F), resp. rate 16, height 1.702 m (5' 7\"), weight 69.9 kg (154 lb), SpO2 99 %. Vitals:    18 1019 18 1021 18 1022 18 1031   BP:   120/66    Pulse:  (!) 45 62 60   Resp:  18 17 16   Temp:   36.7 °C (98 °F)    SpO2:  95% 100% 99%   Weight: 69.9 kg (154 lb)      Height: 1.702 m (5' 7\")       Oxygen Therapy:  Pulse Oximetry: 99 %    Constitutional:  well developed, well nourished, non-toxic, no acute distress  HENMT: " Normocephalic, atraumatic, b/l ears normal, nose normal  Eyes:  EOMI, conjunctiva normal, no discharge  Neck: no tracheal deviation, supple  Cardiovascular: normal heart rate,  Rhythm regular, no murmurs, no rubs or gallops; no cyanosis, clubbing or edema  Lungs: Respiratory effort is normal, normal breath sounds, breath sounds clear to auscultation b/l, no rales, rhonchi or wheezing  Abdomen: soft, non-tender, no guarding or rebound  Skin: warm, dry, no erythema, no rash  Neurologic: Alert and oriented, no focal deficits, CN II-XII normal  Psychiatric: No anxiety or depression    Recent Labs      02/09/18   0959   WBC  7.0   RBC  4.27*   HEMOGLOBIN  13.9*   HEMATOCRIT  40.2*   MCV  94.1   MCH  32.6   MCHC  34.6   RDW  46.4   PLATELETCT  127*   MPV  10.3     Recent Labs      02/09/18   0959   SODIUM  134*   POTASSIUM  4.1   CHLORIDE  102   CO2  24   GLUCOSE  100*   BUN  31*   CREATININE  1.75*   CALCIUM  9.0     Recent Labs      02/09/18   0959   ALTSGPT  6   ASTSGOT  13   ALKPHOSPHAT  107*   TBILIRUBIN  1.1   GLUCOSE  100*     Recent Labs      02/09/18   0959   APTT  67.3*   INR  1.68*             Lab Results   Component Value Date    TROPONINI 0.02 02/09/2018     Urinalysis:    Lab Results  Component Value Date/Time   SPECGRAVITY 1.020 09/04/2017 1738   GLUCOSEUR Negative 09/04/2017 1738   KETONES Negative 09/04/2017 1738   NITRITE Negative 09/04/2017 1738   WBCURINE 0-2 (A) 09/04/2017 1738   RBCURINE 0-2 (A) 09/04/2017 1738   BACTERIA Rare (A) 09/04/2017 1738   EPITHELCELL Few 04/17/2015 0100        Imaging  DX-CHEST-LIMITED (1 VIEW)   Final Result      No acute cardiopulmonary abnormality identified.      CT-CTA NECK WITH & W/O-POST PROCESSING   Final Result      1.  Atherosclerotic plaque involving the right carotid bifurcation with extension into the internal carotid artery. This results in 50% diameter narrowing.      2.  Atherosclerotic plaque involving the left carotid bifurcation with extension into the  internal carotid artery. This results in less than 50% diameter narrowing.      3.  Patent vertebral arteries.      4.  Incomplete visualization of possible extensive ulcerated plaque or dissection of the descending thoracic aorta.      This was discussed with KARLENE PIMENTEL at 11:00 AM on 2/9/2018.      CT-CTA HEAD WITH & W/O-POST PROCESS   Final Result      1.  No evidence of intracranial vascular occlusion or aneurysm.      2.  Diminished flow within the right anterior cerebral artery distally secondary to atherosclerotic narrowing. There is no evidence of complete occlusion.      3.  Atrophy and chronic small vessel ischemic change.      CT-HEAD W/O   Final Result      1.  No evidence of acute intracranial process.      2.  Cerebral atrophy as well as periventricular chronic small vessel ischemic change.      CT-CEREBRAL PERFUSION ANALYSIS   Final Result      1.  CT perfusion examination over the limited section of brain reveals 0 mL of brain parenchyma has less than 30% of cerebral blood flow (CBF).      2.  Please note that the cerebral perfusion was performed on the limited brain tissue around the basal ganglia region. Infarct/ischemia outside the CT perfusion sections can be missed in this study.      CT-CTA COMPLETE THORACOABDOMINAL AORTA    (Results Pending)      Assessment/Plan     I anticipate this patient is appropriate for observation status at this time.    TIA (transient ischemic attack)   Assessment & Plan    TIA / Stroke workup  Pt will be admitted for stroke workup. The pt had a negative brain CT without contrast. The pt will be monitored on telemetry with neuro checks.    echocardiogram are PENDING. The pt will be seen by PT/OT.   Pt will be placed on aspirin and statin. A lipid panel and Hba1c will also be checked.           Abnormal CAT scan   Overview    CTA of the neck showed possible ulcerated plaque vs chronic dissection in thoracoabdominal aorta  I discussed this with vascular  surgery  Patient is asymptomatic, creatinine 1.75  Will defer further workup     Chronic kidney disease, stage III (moderate)- (present on admission)   Assessment & Plan    Creatinine is at or about his baseline  Will try to avoid nephrotoxins, medication doses adjusted to renal        HTN (hypertension)- (present on admission)   Assessment & Plan    We'll hold home blood pressure medication, permissive hypertension.        A-fib (CMS-HCC)- (present on admission)   Assessment & Plan    Heart rate is well-controlled. Patient has pacemaker placed    Continued chronic anticoagulation            Prophylaxis: dabigatran       I spent 80 minutes evaluating Jie Nunez, reviewing the chart, vitals, labs and imaging, discussing the case with ED physician, medication reconciliation, placing orders and enacting the plan above.    Sections of this note have been dictated using Dragon Speech recognition software. While care and attention has been placed to ensure the accuracy of this note, there can be occasional typographical errors that may be missed and I apologize if this situation occurs. Please take these errors into context. If questions occur please do not hesitate to call or notify the author of this note for clarification.

## 2018-02-09 NOTE — ASSESSMENT & PLAN NOTE
Creatinine is at or about his baseline  Will try to avoid nephrotoxins, medication doses adjusted to renal

## 2018-02-09 NOTE — ASSESSMENT & PLAN NOTE
TIA / Stroke workup  Pt will be admitted for stroke workup. The pt had a negative brain CT without contrast. The pt will be monitored on telemetry with neuro checks.    echocardiogram are PENDING. The pt will be seen by PT/OT.   Pt will be placed on aspirin and statin. A lipid panel and Hba1c will also be checked.

## 2018-02-09 NOTE — THERAPY
"Occupational Therapy Evaluation completed.   Functional Status:  Pt presents to skilled OT services following concerns of TIA. Pt was able to perform basic self-care tasks and functional t/f's with supervision/sba, has direct supervision with bathing at baseline. Pt strength and coordination intact bilaterally. Pt appears to be back to baseline which pt's son in agreement. No further skilled OT services indicated at this time.  Plan of Care: Patient with no further skilled OT needs in the acute care setting at this time  Discharge Recommendations:  Equipment: No Equipment Needed. Post-acute therapy Discharge to home with outpatient or home health for additional skilled therapy services. and Currently anticipate no further skilled therapy needs once patient is discharged from the inpatient setting.    See \"Rehab Therapy-Acute\" Patient Summary Report for complete documentation.    "

## 2018-02-09 NOTE — CARE PLAN
Problem: Skin Integrity  Goal: Risk for impaired skin integrity will decrease  Outcome: PROGRESSING AS EXPECTED  2 RN skin check completed with no wounds noted    Problem: Pain Management  Goal: Pain level will decrease to patient's comfort goal  Outcome: PROGRESSING AS EXPECTED  Pt denies pain at this time

## 2018-02-09 NOTE — ED PROVIDER NOTES
ED Provider Note    Scribed for Keyonna Hsieh M.D. by Iwona Helms. 2/9/2018  9:58 AM    Primary care provider: Thelma Cervantes M.D.  Means of arrival: Ambulance  History obtained from: Patient  History limited by: Acute condition    CHIEF COMPLAINT  Weakness      HPI  Jie Nunez is a 89 y.o. male who presents to the Emergency Department by ambulance for weakness with an onset of today. Family visited the patient this morning prior to arrival and found him slumped in his chair. The patient was difficult to arose and was staring off. He was not answering questions. He presents with left facial droop, left upper and lower extremity weakness. History is otherwise unobtainable secondary to the patient's acute condition.      REVIEW OF SYSTEMS  See history of present illness. ROS is unobtainable secondary to the patient's acute condition. C.      PAST MEDICAL HISTORY  Patient has a past medical history of Arrhythmia; Atrial fibrillation (CMS-HCC); Cancer (CMS-HCC); Congestive heart failure (CMS-HCC); and Hypertension.      SURGICAL HISTORY  Patient has a past surgical history that includes lumbar fusion posterior; cholecystectomy; gyn surgery; other orthopedic surgery; shoulder arthroscopy; other cardiac surgery; and other cardiac surgery.      SOCIAL HISTORY  Social History   Substance Use Topics   • Smoking status: Never Smoker   • Smokeless tobacco: Never Used   • Alcohol use No      Comment: occaisiona      History   Drug Use No       FAMILY HISTORY  History reviewed. No pertinent family history.       CURRENT MEDICATIONS  Home Medications     Reviewed by Oliverio Gilman R.N. (Registered Nurse) on 02/09/18 at 1241  Med List Status: Complete   Medication Last Dose Status   ascorbic acid (ASCORBIC ACID) 500 MG Tab 2/9/2018 Active   dabigatran (PRADAXA) 75 MG Cap capsule 2/9/2018 Active   escitalopram (LEXAPRO) 10 MG Tab 2/9/2018 Active   hydroCHLOROthiazide (HYDRODIURIL) 25 MG Tab 2/9/2018 Active   losartan  "(COZAAR) 50 MG Tab 2/9/2018 Active   memantine (NAMENDA) 5 MG Tab 2/8/2018 Active   omeprazole (PRILOSEC) 20 MG delayed-release capsule 2/9/2018 Active   psyllium (METAMUCIL) 58.12 % Pack 2/9/2018 Active   tamsulosin (FLOMAX) 0.4 MG capsule 2/8/2018 Active   vitamin D, Ergocalciferol, (DRISDOL) 01049 units Cap capsule 2/9/2018 Active                ALLERGIES  Allergies   Allergen Reactions   • Beta Adrenergic Blockers Anaphylaxis     HR goes into teens, Cardiology wants him to never have another beta blocker   • Penicillins      Unknown reaction         PHYSICAL EXAM  VITAL SIGNS: /76   Pulse 65   Temp 36.1 °C (97 °F)   Resp 16   Ht 1.702 m (5' 7\")   Wt 69.9 kg (154 lb)   SpO2 98%   BMI 24.12 kg/m²     Constitutional: Elderly male.  HEENT: Normocephalic, Atraumatic,  external ears normal, pharynx pink,  Mucous  Membranes moist, No rhinorrhea or mucosal edema  Eyes: PERRL, EOMI, Conjunctiva normal, No discharge.   Neck: Normal range of motion, No tenderness, Supple, No stridor.   Lymphatic: No lymphadenopathy    Cardiovascular: Regular Rate and Rhythm, No murmurs,  rubs, or gallops.   Thorax & Lungs: Lungs clear to auscultation bilaterally, No respiratory distress, No wheezes, rhales or rhonchi, No chest wall tenderness.   Abdomen: Bowel sounds normal, Soft, non tender, non distended,  No pulsatile masses., no rebound guarding or peritoneal signs.   Skin: Warm, Dry, No erythema, No rash,   Back:  No CVA tenderness,  No spinal tenderness, bony crepitance, step offs, or instability.   Neurologic: NIH 2. Left facial droop, Strength 4/5 to left lower extremity. Oriented to self otherwise confused.  Extremities: Equal, intact distal pulses, No cyanosis, clubbing or edema,  No tenderness.   Musculoskeletal:  No major deformities noted.       DIAGNOSTIC STUDIES / PROCEDURES    LABS  Results for orders placed or performed during the hospital encounter of 02/09/18   URINALYSIS   Result Value Ref Range    Color " Yellow     Character Clear     Specific Gravity 1.026 <1.035    Ph 6.0 5.0 - 8.0    Glucose Negative Negative mg/dL    Ketones Negative Negative mg/dL    Protein Negative Negative mg/dL    Bilirubin Negative Negative    Urobilinogen, Urine 0.2 Negative    Nitrite Negative Negative    Leukocyte Esterase Negative Negative    Occult Blood Negative Negative    Micro Urine Req see below    PROTHROMBIN TIME   Result Value Ref Range    PT 19.5 (H) 12.0 - 14.6 sec    INR 1.68 (H) 0.87 - 1.13   APTT   Result Value Ref Range    APTT 67.3 (H) 24.7 - 36.0 sec   CBC WITH DIFFERENTIAL   Result Value Ref Range    WBC 7.0 4.8 - 10.8 K/uL    RBC 4.27 (L) 4.70 - 6.10 M/uL    Hemoglobin 13.9 (L) 14.0 - 18.0 g/dL    Hematocrit 40.2 (L) 42.0 - 52.0 %    MCV 94.1 81.4 - 97.8 fL    MCH 32.6 27.0 - 33.0 pg    MCHC 34.6 33.7 - 35.3 g/dL    RDW 46.4 35.9 - 50.0 fL    Platelet Count 127 (L) 164 - 446 K/uL    MPV 10.3 9.0 - 12.9 fL    Neutrophils-Polys 72.30 (H) 44.00 - 72.00 %    Lymphocytes 15.90 (L) 22.00 - 41.00 %    Monocytes 7.30 0.00 - 13.40 %    Eosinophils 3.30 0.00 - 6.90 %    Basophils 0.90 0.00 - 1.80 %    Immature Granulocytes 0.30 0.00 - 0.90 %    Nucleated RBC 0.00 /100 WBC    Neutrophils (Absolute) 5.07 1.82 - 7.42 K/uL    Lymphs (Absolute) 1.11 1.00 - 4.80 K/uL    Monos (Absolute) 0.51 0.00 - 0.85 K/uL    Eos (Absolute) 0.23 0.00 - 0.51 K/uL    Baso (Absolute) 0.06 0.00 - 0.12 K/uL    Immature Granulocytes (abs) 0.02 0.00 - 0.11 K/uL    NRBC (Absolute) 0.00 K/uL   COMP METABOLIC PANEL   Result Value Ref Range    Sodium 134 (L) 135 - 145 mmol/L    Potassium 4.1 3.6 - 5.5 mmol/L    Chloride 102 96 - 112 mmol/L    Co2 24 20 - 33 mmol/L    Anion Gap 8.0 0.0 - 11.9    Glucose 100 (H) 65 - 99 mg/dL    Bun 31 (H) 8 - 22 mg/dL    Creatinine 1.75 (H) 0.50 - 1.40 mg/dL    Calcium 9.0 8.5 - 10.5 mg/dL    AST(SGOT) 13 12 - 45 U/L    ALT(SGPT) 6 2 - 50 U/L    Alkaline Phosphatase 107 (H) 30 - 99 U/L    Total Bilirubin 1.1 0.1 - 1.5 mg/dL     Albumin 3.6 3.2 - 4.9 g/dL    Total Protein 6.5 6.0 - 8.2 g/dL    Globulin 2.9 1.9 - 3.5 g/dL    A-G Ratio 1.2 g/dL   TROPONIN   Result Value Ref Range    Troponin I 0.02 0.00 - 0.04 ng/mL   ESTIMATED GFR   Result Value Ref Range    GFR If  44 (A) >60 mL/min/1.73 m 2    GFR If Non  37 (A) >60 mL/min/1.73 m 2   TSH (Thyroid Stimulating Hormone)   Result Value Ref Range    TSH 1.050 0.380 - 5.330 uIU/mL   Hemoglobin A1c   Result Value Ref Range    Glycohemoglobin 5.7 (H) 0.0 - 5.6 %    Est Avg Glucose 117 mg/dL   MAGNESIUM   Result Value Ref Range    Magnesium 1.6 1.5 - 2.5 mg/dL       All labs reviewed by me.    EKG  12 lead EKG; Interpreted by emergency department physician    Rhythm: Normal Sinus Rhythm   Rate: normal  Axis: Normal  R Wave: Normal R wave progression  Normal ST-T segments  ST Segments: No ST segment elevation   T waves: Normal  Q waves: None    Clinical Impression: No acute changes    RADIOLOGY  DX-CHEST-LIMITED (1 VIEW)   Final Result      No acute cardiopulmonary abnormality identified.      CT-CTA NECK WITH & W/O-POST PROCESSING   Final Result      1.  Atherosclerotic plaque involving the right carotid bifurcation with extension into the internal carotid artery. This results in 50% diameter narrowing.      2.  Atherosclerotic plaque involving the left carotid bifurcation with extension into the internal carotid artery. This results in less than 50% diameter narrowing.      3.  Patent vertebral arteries.      4.  Incomplete visualization of possible extensive ulcerated plaque or dissection of the descending thoracic aorta.      This was discussed with KARLENE PIMENTEL at 11:00 AM on 2/9/2018.      CT-CTA HEAD WITH & W/O-POST PROCESS   Final Result      1.  No evidence of intracranial vascular occlusion or aneurysm.      2.  Diminished flow within the right anterior cerebral artery distally secondary to atherosclerotic narrowing. There is no evidence of complete  occlusion.      3.  Atrophy and chronic small vessel ischemic change.      CT-HEAD W/O   Final Result      1.  No evidence of acute intracranial process.      2.  Cerebral atrophy as well as periventricular chronic small vessel ischemic change.      CT-CEREBRAL PERFUSION ANALYSIS   Final Result      1.  CT perfusion examination over the limited section of brain reveals 0 mL of brain parenchyma has less than 30% of cerebral blood flow (CBF).      2.  Please note that the cerebral perfusion was performed on the limited brain tissue around the basal ganglia region. Infarct/ischemia outside the CT perfusion sections can be missed in this study.        The radiologist's interpretation of all radiological studies have been reviewed by me.      COURSE & MEDICAL DECISION MAKING  Pertinent Labs & Imaging studies reviewed. (See chart for details)    Differential Diagnosis include but are not limited to:  TIA, CVA, dehydration, arrythmia    .    10:14 AM Patient seen and examined at bedside. Patient presents for weakness.  Exam indicates improvement in speech and neurologic exam compared to when I first saw him in the davis in front of charge desk.       Initial orders in the Emergency Department included CT cerebral perfusion analysis, XR chest, CT head, CT-CTA head, CT-CTA neck and laboratory testing: UA, prothrombin time, APTT, CBC with differential, CMP, estimated GFR and troponin.     10:19 AM Spoke with Dr. Hou, Neurology.     10:23 AM Consulted with Dr. Burciaga, Hospitalist, regarding the patient's presenting symptoms and pending CT results. He will consult and admit the patient for further evaluation and care.      DISPOSITION  Patient will be admitted to hospitalist with a consult to neurology in guarded condition.      DIAGNOSIS  1. Transient alteration of awareness    2. Transient cerebral ischemia, unspecified type           The note accurately reflects work and decisions made by me.  Keyonna Hsieh  2/9/2018   2:58 PM     Iwona MAC (Scribe), am scribing for, and in the presence of, Keyonna Hsieh M.D.    Electronically signed by: Iwona Helms (Susyibslick), 2/9/2018    Keyonna MAC M.D. personally performed the services described in this documentation, as scribed by Iwona Helms in my presence, and it is both accurate and complete.

## 2018-02-09 NOTE — ED TRIAGE NOTES
Chief Complaint   Patient presents with   • Possible Stroke     BIB EMS from [facility]. pt was found to be unresponsive beginning at 0900 for approximately 15 minutes. pt was sitting up in chair with eyes open, unable to speak.      Pt noted to have left lower facial droop and left leg weakness.   Neuro at bedside.

## 2018-02-09 NOTE — PROGRESS NOTES
Pt A&O x 2, disoriented to time and situation. Reoriented as able. Pt with short term memory loss per family, from a memory care facility. Pt denies pain, denies numbness and tingling. BA in place. Admit profile completed. Per orders NPO at this time pending SLP eval. SLP paged to follow up. Plan of care discussed and pt resting at this time.

## 2018-02-09 NOTE — ED NOTES
The Medication Reconciliation process has been completed by interviewing the patient and family at bedside.  Information from the MAR from [facility] was used to complete med rec.     Allergies have been reviewed

## 2018-02-10 ENCOUNTER — PATIENT OUTREACH (OUTPATIENT)
Dept: HEALTH INFORMATION MANAGEMENT | Facility: OTHER | Age: 83
End: 2018-02-10

## 2018-02-10 ENCOUNTER — APPOINTMENT (OUTPATIENT)
Dept: RADIOLOGY | Facility: MEDICAL CENTER | Age: 83
End: 2018-02-10
Attending: PSYCHIATRY & NEUROLOGY
Payer: MEDICARE

## 2018-02-10 VITALS
BODY MASS INDEX: 27.82 KG/M2 | HEART RATE: 61 BPM | HEIGHT: 67 IN | WEIGHT: 177.25 LBS | SYSTOLIC BLOOD PRESSURE: 146 MMHG | TEMPERATURE: 97.3 F | DIASTOLIC BLOOD PRESSURE: 80 MMHG | OXYGEN SATURATION: 97 % | RESPIRATION RATE: 18 BRPM

## 2018-02-10 LAB
ALBUMIN SERPL BCP-MCNC: 3 G/DL (ref 3.2–4.9)
ALBUMIN/GLOB SERPL: 1 G/DL
ALP SERPL-CCNC: 95 U/L (ref 30–99)
ALT SERPL-CCNC: 6 U/L (ref 2–50)
ANION GAP SERPL CALC-SCNC: 8 MMOL/L (ref 0–11.9)
AST SERPL-CCNC: 12 U/L (ref 12–45)
BASOPHILS # BLD AUTO: 1 % (ref 0–1.8)
BASOPHILS # BLD: 0.08 K/UL (ref 0–0.12)
BILIRUB SERPL-MCNC: 0.6 MG/DL (ref 0.1–1.5)
BUN SERPL-MCNC: 31 MG/DL (ref 8–22)
CALCIUM SERPL-MCNC: 8.5 MG/DL (ref 8.5–10.5)
CHLORIDE SERPL-SCNC: 104 MMOL/L (ref 96–112)
CHOLEST SERPL-MCNC: 86 MG/DL (ref 100–199)
CO2 SERPL-SCNC: 24 MMOL/L (ref 20–33)
CREAT SERPL-MCNC: 1.57 MG/DL (ref 0.5–1.4)
EOSINOPHIL # BLD AUTO: 0.39 K/UL (ref 0–0.51)
EOSINOPHIL NFR BLD: 4.8 % (ref 0–6.9)
ERYTHROCYTE [DISTWIDTH] IN BLOOD BY AUTOMATED COUNT: 45.8 FL (ref 35.9–50)
GLOBULIN SER CALC-MCNC: 2.9 G/DL (ref 1.9–3.5)
GLUCOSE SERPL-MCNC: 98 MG/DL (ref 65–99)
HCT VFR BLD AUTO: 39.1 % (ref 42–52)
HDLC SERPL-MCNC: 27 MG/DL
HGB BLD-MCNC: 13.3 G/DL (ref 14–18)
IMM GRANULOCYTES # BLD AUTO: 0.04 K/UL (ref 0–0.11)
IMM GRANULOCYTES NFR BLD AUTO: 0.5 % (ref 0–0.9)
LDLC SERPL CALC-MCNC: 44 MG/DL
LYMPHOCYTES # BLD AUTO: 1.31 K/UL (ref 1–4.8)
LYMPHOCYTES NFR BLD: 16.2 % (ref 22–41)
MCH RBC QN AUTO: 31.7 PG (ref 27–33)
MCHC RBC AUTO-ENTMCNC: 34 G/DL (ref 33.7–35.3)
MCV RBC AUTO: 93.1 FL (ref 81.4–97.8)
MONOCYTES # BLD AUTO: 0.69 K/UL (ref 0–0.85)
MONOCYTES NFR BLD AUTO: 8.5 % (ref 0–13.4)
NEUTROPHILS # BLD AUTO: 5.58 K/UL (ref 1.82–7.42)
NEUTROPHILS NFR BLD: 69 % (ref 44–72)
NRBC # BLD AUTO: 0 K/UL
NRBC BLD-RTO: 0 /100 WBC
PLATELET # BLD AUTO: 128 K/UL (ref 164–446)
PMV BLD AUTO: 10.6 FL (ref 9–12.9)
POTASSIUM SERPL-SCNC: 4.1 MMOL/L (ref 3.6–5.5)
PROT SERPL-MCNC: 5.9 G/DL (ref 6–8.2)
RBC # BLD AUTO: 4.2 M/UL (ref 4.7–6.1)
SODIUM SERPL-SCNC: 136 MMOL/L (ref 135–145)
TRIGL SERPL-MCNC: 77 MG/DL (ref 0–149)
WBC # BLD AUTO: 8.1 K/UL (ref 4.8–10.8)

## 2018-02-10 PROCEDURE — 700102 HCHG RX REV CODE 250 W/ 637 OVERRIDE(OP): Performed by: INTERNAL MEDICINE

## 2018-02-10 PROCEDURE — 99217 PR OBSERVATION CARE DISCHARGE: CPT | Performed by: INTERNAL MEDICINE

## 2018-02-10 PROCEDURE — 80061 LIPID PANEL: CPT

## 2018-02-10 PROCEDURE — A9270 NON-COVERED ITEM OR SERVICE: HCPCS | Performed by: INTERNAL MEDICINE

## 2018-02-10 PROCEDURE — 80053 COMPREHEN METABOLIC PANEL: CPT

## 2018-02-10 PROCEDURE — 36415 COLL VENOUS BLD VENIPUNCTURE: CPT

## 2018-02-10 PROCEDURE — G0378 HOSPITAL OBSERVATION PER HR: HCPCS

## 2018-02-10 PROCEDURE — 85025 COMPLETE CBC W/AUTO DIFF WBC: CPT

## 2018-02-10 RX ORDER — ASPIRIN 81 MG/1
81 TABLET, CHEWABLE ORAL DAILY
Status: DISCONTINUED | OUTPATIENT
Start: 2018-02-10 | End: 2018-02-10

## 2018-02-10 RX ORDER — ASPIRIN 81 MG/1
81 TABLET, CHEWABLE ORAL DAILY
Qty: 100 TAB | Refills: 99 | Status: SHIPPED | OUTPATIENT
Start: 2018-02-10 | End: 2018-02-10

## 2018-02-10 RX ORDER — ATORVASTATIN CALCIUM 40 MG/1
40 TABLET, FILM COATED ORAL EVERY EVENING
Qty: 30 TAB | Refills: 12 | Status: SHIPPED | OUTPATIENT
Start: 2018-02-10 | End: 2018-02-27 | Stop reason: SDUPTHER

## 2018-02-10 RX ADMIN — ASPIRIN 81 MG: 81 TABLET, CHEWABLE ORAL at 10:05

## 2018-02-10 RX ADMIN — DABIGATRAN ETEXILATE MESYLATE 75 MG: 75 CAPSULE ORAL at 08:19

## 2018-02-10 RX ADMIN — ESCITALOPRAM OXALATE 10 MG: 10 TABLET ORAL at 08:19

## 2018-02-10 RX ADMIN — PSYLLIUM HUSK 1 PACKET: 3.4 POWDER ORAL at 08:19

## 2018-02-10 RX ADMIN — TAMSULOSIN HYDROCHLORIDE 0.4 MG: 0.4 CAPSULE ORAL at 08:19

## 2018-02-10 RX ADMIN — OMEPRAZOLE 20 MG: 20 CAPSULE, DELAYED RELEASE ORAL at 08:19

## 2018-02-10 ASSESSMENT — PAIN SCALES - GENERAL
PAINLEVEL_OUTOF10: 0

## 2018-02-10 NOTE — DISCHARGE PLANNING
Medical Social Work    Referral: Transporation request     Intervention: Pt lives at [facility] and their  is out sick. This writer left a voicemail for [facility] to see if [facility] transportation can arrange transporation.    Plan: Wait for [facility] transportation to confirm if they can take the pt home today. This writer spoke to [facility] Address: [address] [place] [address]Rzpjp: [phone] who can accept the pt today.

## 2018-02-10 NOTE — CONSULTS
DATE OF SERVICE:  02/09/2018    REQUESTING PHYSICIAN:  Keyonna Hsieh MD    REASON FOR CONSULTATION:  Stroke code.    HISTORY OF PRESENT ILLNESS:  The patient is a 89-year-old right-handed male   with past medical history significant for atrial fibrillation, currently not   on anticoagulation, history of prostate cancer, history of congestive heart   failure and hypertension who was brought to the emergency room for evaluation   of unresponsiveness associated with left-sided weakness noted by family   member.  Apparently, his son-in-law went to pick him up to take him to hair   salon when he found him unresponsive on the couch leaning to the left.  He   tried to communicate with him, but he would not answer.  This lasted for   almost 15 minutes.  He then was noted to have some left facial droop and left   upper and lower extremity weakness.  He was brought to emergency room and   underwent a brain CT and CT angiogram of the head, which were both   unremarkable.  He underwent CT angiogram of the neck which did not reveal   carotid artery stenosis; however, there was some ulcerated plaque in the   descending aorta.  By the time the patient came to emergency room.  Most of   his symptoms resolved and he was able to communicate, but he did not know what   happened that he is in the hospital.  Of note, he has history of dementia as   well for which [facility].    PAST MEDICAL HISTORY:  Significant for hypertension, history of atrial   fibrillation, history of prostate cancer, history of congestive heart failure,   and history of dementia.    MEDICATIONS:  Reviewed as per MAR.    ALLERGIES:  BETA ADRENERGIC BLOCKER AND PENICILLIN.    SOCIAL HISTORY:  He has no history of smoking, drinking, or drug abuse.    FAMILY HISTORY:  Reviewed and noncontributory.  There is no history of   premature heart attack or stroke.    REVIEW OF SYSTEMS:  As above.  All other systems are normal.  Please also  see   Dr. Velazquez's Review of systems in his H and P.    PHYSICAL EXAMINATION:  VITAL SIGNS:  Heart rate 63, blood pressure 146/83, respirations 16, O2 sat   98% on room air, temperature 36.2.  NECK:  Supple, no carotid bruit.  CARDIOVASCULAR:  Regular rate and rhythm.  LUNGS:  Clear.  ABDOMEN:  Soft.  EXTREMITIES:  No cyanosis or clubbing.  NEUROLOGIC:  He is awake, alert, but disoriented to place.  He knows he is in   the hospital.  He knew he is in [place] however, he cannot tell me what date it   is and according to his son-in-law he does not keep up with dates.  Pupils are   equal and reactive.  Extraocular movements are full.  Visual fields are full   to confrontation.  Facial motor revealed a very subtle left facial weakness,   although he showed me a picture from [employer] that looked kind of the same,   so I am not sure if this facial asymmetry is new.  Facial sensation is intact.    Hearing is decreased to finger rub bilaterally.  Tongue midline and   protrudes symmetrically.  Palate elevates symmetrically.  Shoulder shrugs are   normal.  Motor examination revealed normal strength to direct testing of both   upper and lower extremity, proximal and distal.  Sensory intact light touch,   temperature, and pinprick.  Coordination intact finger-to-nose testing and   heel-to-shin testing.  Deep tendon reflexes are 1+ and equal.  plantar   reflexes are equivocal.    ASSESSMENT AND PLAN:  A 89-year-old male with stroke-like episode with   left-sided weakness involving face, arm, and leg, which is now mostly   resolved.  His NIH scale score at the most is 1 due to facial asymmetry,   although this might be even old.  CT angiogram of the head did not reveal   large vessel occlusion.  He is not a TPA candidate and he is not a   thrombectomy candidate.  He had some ulcerated plaque in the descending aorta   for which he was evaluated by vascular surgery and there is no intervention is   indicated.  He will be admitted for  stroke workup.  He will undergo   echocardiogram.  We will check lipid profile.  He has underlying atrial   fibrillation for secondary stroke prevention.  I would recommend   anticoagulation if there is no other contraindication.  He will be on Lovenox   for DVT prevention.  He will be evaluated by physical therapy, occupational   therapy, and speech therapy.       ____________________________________     MD ELVIRA Vela / ESTEPHANIA    DD:  02/09/2018 18:31:23  DT:  02/09/2018 19:34:46    D#:  [id number]  Job#:  [id number]

## 2018-02-10 NOTE — RESPIRATORY CARE
COPD EDUCATION by COPD CLINICAL EDUCATOR  2/10/2018 at 7:12 AM by Mari Liriano     Patient reviewed by COPD education team. Patient does not qualify for COPD program.

## 2018-02-10 NOTE — PROGRESS NOTES
Dr. Cortes called back. Orders to d/c CT scan and proceed with d/c. Asa is also d/c'ed. Dr. Hernández notified.

## 2018-02-10 NOTE — THERAPY
"Physical Therapy Evaluation completed.   Bed Mobility:  Supine to Sit: Stand by Assist  Transfers: Sit to Stand: Stand by Assist  Gait: Level Of Assist: Stand by Assist (initially CGA, progressed to SBA) with Front-Wheel Walker x 40 ft      Plan of Care: Patient with no further skilled PT needs in the acute care setting at this time  Discharge Recommendations: Equipment: No Equipment Needed.   Post-acute therapy Discharge to home with outpatient or home health for additional skilled therapy services.    Patient is 89/M admitted with possible TIA. Patient lives in memory care unit. PLOF MOD INDEP transfers and household amb w/4WW. Patient presents today appearing close to baseline level of mobility. BLE strength is 4/5 in all major muscle groups, no focal weakness noted. No further acute PT indicated at this time. Recommend patient amb with nsg or family SBA and use of FWW. Rec d/c back to memory care unit, may benefit from  PT. Has indicated AD/DME.     See \"Rehab Therapy-Acute\" Patient Summary Report for complete documentation.     "

## 2018-02-10 NOTE — PROGRESS NOTES
Assisted pt to the bathroom. Pt has steady gait. Pt A&Ox2 to place and self. Reoriented pt to year and situation. (Refer to nursing flow sheet for more information).

## 2018-02-10 NOTE — PROGRESS NOTES
Pt was found with an IV removed. Pt was cleaned up. Pt is confused. Per , transport is arranged at 1730. Discharge orders in place. Notified Mariam with [facility]. They are ready to accept him at that time.

## 2018-02-10 NOTE — PROGRESS NOTES
Son Jozef called asking for an update. Discussed with Jozef that pt is to discharge today to [facility]. Their  is sick so unable to arrange the drive. This RN communicated the issue with our SW. SW to attempt to arrange [facility] to take pt to [facility] by W/V on room air. Jozef agrees for his dad to discharge via telephone. Pt unable to sign his own paperwork since he is confused.

## 2018-02-10 NOTE — RESPIRATORY CARE
COPD EDUCATION by COPD CLINICAL EDUCATOR  2/10/2018 at 7:07 AM by Mari Liriano     Patient reviewed by COPD education team. Patient does not qualify for COPD program.

## 2018-02-10 NOTE — CONSULTS
DATE OF SERVICE:  02/09/2018    CONSULTING PHYSICIAN:  Hospitalist.    CONSULTANT:  Varghese Jimenez MD.    REASON FOR CONSULTATION:  Evaluate the patient with abnormal CT scan.    HISTORY OF PRESENT ILLNESS:  This is a 89-year-old gentleman who has a history   of atrial fibrillation, pacemaker, congestive heart failure, hypertension and   dementia who was found unresponsive at 09:00 a.m. this morning.  The patient   was brought into the emergency room for further evaluation and the patient's   workup included a CTA of the patient's neck.  The CTA demonstrated minimal   disease within the carotid arteries and arch, but there was in the proximal   descending thoracic aorta an abnormality, which the radiologist interpreted as   a possible dissection.    PAST MEDICAL HISTORY:  Arrhythmia, atrial fibrillation, cancer, congestive   heart failure, and hypertension.    PAST SURGICAL HISTORY:  Cholecystectomy, lumbar fusion and the shoulder   surgery.    FAMILY HISTORY:  Denies heart disease, cancer, or stroke.  He never smoked,   never did illicit drugs.    MEDICATIONS:  As an outpatient, please refer to the history and physical.    PHYSICAL EXAMINATION:  GENERAL:  Currently, the patient is awake and alert.  No apparent distress.  HEENT:  Head is normocephalic.  LUNGS:  Clear.  ABDOMEN:  Soft.  EXTREMITIES:  Unremarkable.  He has a normal peripheral vascular exam.  He   does have some deformity of his left shoulder as a result of surgeries and   various degenerative joint disease.  At this point, he has equal strength in   his upper and lower extremities.  The patient does have some memory deficits   consistent with his dementia.    LABORATORY WORK:  Demonstrates a white blood cell count of 7, H and H of 13   and 40, and platelet count of 127.  Sodium is 134, potassium 4.1, BUN is 31,   and creatinine 1.75.  Liver function studies are normal.    CT scan of the neck was performed, which demonstrates atherosclerotic plaque    involving the carotid arteries, which is resulting in a 50% narrowing on the   right side and on the left side it is less than 50% and there is the   suggestion of an ulcerative plaque versus dissection of the descending   thoracic aorta.    IMPRESSION:  Syncopal event.  Unknown etiology.  Do not suspect that this is   the result of any type of embolic event from the arch or carotid arteries.    With respect to the abnormality seen on the CT, it looks like he does have an   atheromatous plaque in the proximal descending thoracic aorta, but he has a   normal peripheral vascular exam and there is no need to image this further or   there is no indication for intervention with that.    RECOMMENDATIONS:  At this point, no recommendations from vascular surgery.  No   further workup or intervention is warranted and will be happy to see this   patient in the future if anything else arises.       ____________________________________     SAMPSON DODSON MD    [facility] / NTS    DD:  02/09/2018 14:27:01  DT:  02/09/2018 16:51:31    D#:  [id number]  Job#:  [id number]

## 2018-02-10 NOTE — DISCHARGE INSTRUCTIONS
Discharge Instructions    Discharged to [facility] by [facility] with escort. Discharged via wheelchair, hospital escort: Yes.  Special equipment needed: Not Applicable    Be sure to schedule a follow-up appointment with your primary care doctor or any specialists as instructed.     Discharge Plan: Pt is not prescribed Aspirin.   Diet Plan: Discussed  Activity Level: Discussed  Confirmed Follow up Appointment: Appointment Scheduled  Confirmed Symptoms Management: Discussed  Medication Reconciliation Updated: Yes  Influenza Vaccine Indication: Not indicated: Previously immunized this influenza season and > 8 years of age    I understand that a diet low in cholesterol, fat, and sodium is recommended for good health. Unless I have been given specific instructions below for another diet, I accept this instruction as my diet prescription.   Other diet: Dysphagia 1, Nectar Thick fluids, pills crushed and floated in apple sauce    Special Instructions: None    · Is patient discharged on Warfarin / Coumadin?   No     Depression / Suicide Risk    As you are discharged from this [facility] facility, it is important to learn how to keep safe from harming yourself.    Recognize the warning signs:  · Abrupt changes in personality, positive or negative- including increase in energy   · Giving away possessions  · Change in eating patterns- significant weight changes-  positive or negative  · Change in sleeping patterns- unable to sleep or sleeping all the time   · Unwillingness or inability to communicate  · Depression  · Unusual sadness, discouragement and loneliness  · Talk of wanting to die  · Neglect of personal appearance   · Rebelliousness- reckless behavior  · Withdrawal from people/activities they love  · Confusion- inability to concentrate     If you or a loved one observes any of these behaviors or has concerns about self-harm, here's what you can do:  · Talk about it- your feelings and reasons for harming  "yourself  · Remove any means that you might use to hurt yourself (examples: pills, rope, extension cords, firearm)  · Get professional help from the community (Mental Health, Substance Abuse, psychological counseling)  · Do not be alone:Call your Safe Contact- someone whom you trust who will be there for you.  · Call your local CRISIS HOTLINE 651-1932 or 498-200-0193  · Call your local Children's Mobile Crisis Response Team [place] (972) 133-6404 or www[employer] Mobile  · Call the toll free National Suicide Prevention Hotlines   · National Suicide Prevention Lifeline 447-152-LETE (0622)  · [employer] Line Network 800-SUICIDE (640-8653)      Transient Ischemic Attack  A transient ischemic attack (TIA) is a \"warning stroke\" that causes stroke-like symptoms. Unlike a stroke, a TIA does not cause permanent damage to the brain. The symptoms of a TIA can happen very fast and do not last long. It is important to know the symptoms of a TIA and what to do. This can help prevent a major stroke or death.  CAUSES   A TIA is caused by a temporary blockage in an artery in the brain or neck (carotid artery). The blockage does not allow the brain to get the blood supply it needs and can cause different symptoms. The blockage can be caused by either:  · A blood clot.  · Fatty buildup (plaque) in a neck or brain artery.  RISK FACTORS  · High blood pressure (hypertension).  · High cholesterol.  · Diabetes mellitus.  · Heart disease.  · The buildup of plaque in the blood vessels (peripheral artery disease or atherosclerosis).  · The buildup of plaque in the blood vessels that provide blood and oxygen to the brain (carotid artery stenosis).  · An abnormal heart rhythm (atrial fibrillation).  · Obesity.  · Using any tobacco products, including cigarettes, chewing tobacco, or electronic cigarettes.  · Taking oral contraceptives, especially in combination with using tobacco.  · Physical inactivity.  · A diet high in fats, salt " (sodium), and calories.  · Excessive alcohol use.  · Use of illegal drugs (especially cocaine and methamphetamine).  · Being male.  · Being .  · Being over the age of 55 years.  · Family history of stroke.  · Previous history of blood clots, stroke, TIA, or heart attack.  · Sickle cell disease.  SIGNS AND SYMPTOMS   TIA symptoms are the same as a stroke but are temporary. These symptoms usually develop suddenly, or may be newly present upon waking from sleep:  · Sudden weakness or numbness of the face, arm, or leg, especially on one side of the body.  · Sudden trouble walking or difficulty moving arms or legs.  · Sudden confusion.  · Sudden personality changes.  · Trouble speaking (aphasia) or understanding.  · Difficulty swallowing.  · Sudden trouble seeing in one or both eyes.  · Double vision.  · Dizziness.  · Loss of balance or coordination.  · Sudden severe headache with no known cause.  · Trouble reading or writing.  · Loss of bowel or bladder control.  · Loss of consciousness.  DIAGNOSIS   Your health care provider may be able to determine the presence or absence of a TIA based on your symptoms, history, and physical exam. CT scan of the brain is usually performed to help identify a TIA. Other tests may include:  · Electrocardiography (ECG).  · Continuous heart monitoring.  · Echocardiography.  · Carotid ultrasonography.  · MRI.  · A scan of the brain circulation.  · Blood tests.  TREATMENT   Since the symptoms of TIA are the same as a stroke, it is important to seek treatment as soon as possible. You may need a medicine to dissolve a blood clot (thrombolytic) if that is the cause of the TIA. This medicine cannot be given if too much time has passed. Treatment may also include:   · Rest, oxygen, fluids through an IV tube, and medicines to thin the blood (anticoagulants).  · Measures will be taken to prevent short-term and long-term complications, including infection from breathing foreign  material into the lungs (aspiration pneumonia), blood clots in the legs, and falls.  · Procedures to either remove plaque in the carotid arteries or dilate carotid arteries that have narrowed due to plaque. Those procedures are:  ¨ Carotid endarterectomy.  ¨ Carotid angioplasty and stenting.  · Medicines and diet may be used to address diabetes, high blood pressure, and other underlying risk factors.  HOME CARE INSTRUCTIONS   · Take medicines only as directed by your health care provider. Follow the directions carefully. Medicines may be used to control risk factors for a stroke. Be sure you understand all your medicine instructions.  · You may be told to take aspirin or the anticoagulant warfarin. Warfarin needs to be taken exactly as instructed.  ¨ Taking too much or too little warfarin is dangerous. Too much warfarin increases the risk of bleeding. Too little warfarin continues to allow the risk for blood clots. While taking warfarin, you will need to have regular blood tests to measure your blood clotting time. A PT blood test measures how long it takes for blood to clot. Your PT is used to calculate another value called an INR. Your PT and INR help your health care provider to adjust your dose of warfarin. The dose can change for many reasons. It is critically important that you take warfarin exactly as prescribed.  ¨ Many foods, especially foods high in vitamin K can interfere with warfarin and affect the PT and INR. Foods high in vitamin K include spinach, kale, broccoli, cabbage, billy and turnip greens, Uriah sprouts, peas, cauliflower, seaweed, and parsley, as well as beef and pork liver, green tea, and soybean oil. You should eat a consistent amount of foods high in vitamin K. Avoid major changes in your diet, or notify your health care provider before changing your diet. Arrange a visit with a dietitian to answer your questions.  ¨ Many medicines can interfere with warfarin and affect the PT and  INR. You must tell your health care provider about any and all medicines you take; this includes all vitamins and supplements. Be especially cautious with aspirin and anti-inflammatory medicines. Do not take or discontinue any prescribed or over-the-counter medicine except on the advice of your health care provider or pharmacist.  ¨ Warfarin can have side effects, such as excessive bruising or bleeding. You will need to hold pressure over cuts for longer than usual. Your health care provider or pharmacist will discuss other potential side effects.  ¨ Avoid sports or activities that may cause injury or bleeding.  ¨ Be careful when shaving, flossing your teeth, or handling sharp objects.  ¨ Alcohol can change the body's ability to handle warfarin. It is best to avoid alcoholic drinks or consume only very small amounts while taking warfarin. Notify your health care provider if you change your alcohol intake.  ¨ Notify your dentist or other health care providers before procedures.  · Eat a diet that includes 5 or more servings of fruits and vegetables each day. This may reduce the risk of stroke. Certain diets may be prescribed to address high blood pressure, high cholesterol, diabetes, or obesity.  ¨ A diet low in sodium, saturated fat, trans fat, and cholesterol is recommended to manage high blood pressure.  ¨ A diet low in saturated fat, trans fat, and cholesterol, and high in fiber may control cholesterol levels.  ¨ A controlled-carbohydrate, controlled-sugar diet is recommended to manage diabetes.  ¨ A reduced-calorie diet that is low in sodium, saturated fat, trans fat, and cholesterol is recommended to manage obesity.  · Maintain a healthy weight.  · Stay physically active. It is recommended that you get at least 30 minutes of activity on most or all days.  · Do not use any tobacco products, including cigarettes, chewing tobacco, or electronic cigarettes. If you need help quitting, ask your health care  provider.  · Limit alcohol intake to no more than 1 drink per day for nonpregnant women and 2 drinks per day for men. One drink equals 12 ounces of beer, 5 ounces of wine, or 1½ ounces of hard liquor.  · Do not abuse drugs.  · A safe home environment is important to reduce the risk of falls. Your health care provider may arrange for specialists to evaluate your home. Having grab bars in the bedroom and bathroom is often important. Your health care provider may arrange for equipment to be used at home, such as raised toilets and a seat for the shower.  · Follow all instructions for follow-up with your health care provider. This is very important. This includes any referrals and lab tests. Proper follow-up can prevent a stroke or another TIA from occurring.  PREVENTION   The risk of a TIA can be decreased by appropriately treating high blood pressure, high cholesterol, diabetes, heart disease, and obesity, and by quitting smoking, limiting alcohol, and staying physically active.  SEEK MEDICAL CARE IF:  · You have personality changes.  · You have difficulty swallowing.  · You are seeing double.  · You have dizziness.  · You have a fever.  SEEK IMMEDIATE MEDICAL CARE IF:   Any of the following symptoms may represent a serious problem that is an emergency. Do not wait to see if the symptoms will go away. Get medical help right away. Call your local emergency services (911 in [place] Do not drive yourself to the hospital.  · You have sudden weakness or numbness of the face, arm, or leg, especially on one side of the body.  · You have sudden trouble walking or difficulty moving arms or legs.  · You have sudden confusion.  · You have trouble speaking (aphasia) or understanding.  · You have sudden trouble seeing in one or both eyes.  · You have a loss of balance or coordination.  · You have a sudden, severe headache with no known cause.  · You have new chest pain or an irregular heartbeat.  · You have a partial or total loss  of consciousness.  MAKE SURE YOU:   · Understand these instructions.  · Will watch your condition.  · Will get help right away if you are not doing well or get worse.     This information is not intended to replace advice given to you by your health care provider. Make sure you discuss any questions you have with your health care provider.     Document Released: 09/27/2006 Document Revised: 01/08/2016 Document Reviewed: 03/25/2015  [employer] Interactive Patient Education ©2016 Else[employer] Inc.  Stroke Prevention  Some medical conditions and behaviors are associated with an increased chance of having a stroke. You may prevent a stroke by making healthy choices and managing medical conditions.  HOW CAN I REDUCE MY RISK OF HAVING A STROKE?   · Stay physically active. Get at least 30 minutes of activity on most or all days.  · Do not smoke. It may also be helpful to avoid exposure to secondhand smoke.  · Limit alcohol use. Moderate alcohol use is considered to be:  ¨ No more than 2 drinks per day for men.  ¨ No more than 1 drink per day for nonpregnant women.  · Eat healthy foods. This involves:  ¨ Eating 5 or more servings of fruits and vegetables a day.  ¨ Making dietary changes that address high blood pressure (hypertension), high cholesterol, diabetes, or obesity.  · Manage your cholesterol levels.  ¨ Making food choices that are high in fiber and low in saturated fat, trans fat, and cholesterol may control cholesterol levels.  ¨ Take any prescribed medicines to control cholesterol as directed by your health care provider.  · Manage your diabetes.  ¨ Controlling your carbohydrate and sugar intake is recommended to manage diabetes.  ¨ Take any prescribed medicines to control diabetes as directed by your health care provider.  · Control your hypertension.  ¨ Making food choices that are low in salt (sodium), saturated fat, trans fat, and cholesterol is recommended to manage hypertension.  ¨ Ask your health care provider  if you need treatment to lower your blood pressure. Take any prescribed medicines to control hypertension as directed by your health care provider.  ¨ If you are 18-39 years of age, have your blood pressure checked every 3-5 years. If you are 40 years of age or older, have your blood pressure checked every year.  · Maintain a healthy weight.  ¨ Reducing calorie intake and making food choices that are low in sodium, saturated fat, trans fat, and cholesterol are recommended to manage weight.  · Stop drug abuse.  · Avoid taking birth control pills.  ¨ Talk to your health care provider about the risks of taking birth control pills if you are over 35 years old, smoke, get migraines, or have ever had a blood clot.  · Get evaluated for sleep disorders (sleep apnea).  ¨ Talk to your health care provider about getting a sleep evaluation if you snore a lot or have excessive sleepiness.  · Take medicines only as directed by your health care provider.  ¨ For some people, aspirin or blood thinners (anticoagulants) are helpful in reducing the risk of forming abnormal blood clots that can lead to stroke. If you have the irregular heart rhythm of atrial fibrillation, you should be on a blood thinner unless there is a good reason you cannot take them.  ¨ Understand all your medicine instructions.  · Make sure that other conditions (such as anemia or atherosclerosis) are addressed.  SEEK IMMEDIATE MEDICAL CARE IF:   · You have sudden weakness or numbness of the face, arm, or leg, especially on one side of the body.  · Your face or eyelid droops to one side.  · You have sudden confusion.  · You have trouble speaking (aphasia) or understanding.  · You have sudden trouble seeing in one or both eyes.  · You have sudden trouble walking.  · You have dizziness.  · You have a loss of balance or coordination.  · You have a sudden, severe headache with no known cause.  · You have new chest pain or an irregular heartbeat.  Any of these symptoms  may represent a serious problem that is an emergency. Do not wait to see if the symptoms will go away. Get medical help at once. Call your local emergency services (911 in [place] Do not drive yourself to the hospital.     This information is not intended to replace advice given to you by your health care provider. Make sure you discuss any questions you have with your health care provider.     Document Released: 01/25/2006 Document Revised: 01/08/2016 Document Reviewed: 06/20/2014  [employer] Interactive Patient Education ©2016 Elsevier Inc.   Dabigatran oral capsules  What is this medicine?  DABIGATRAN (DA bi GAT ran) is an anticoagulant (blood thinner). It is used to lower the chance of stroke in people with a medical condition called atrial fibrillation.  This medicine may be used for other purposes; ask your health care provider or pharmacist if you have questions.  COMMON BRAND NAME(S): Pradaxa  What should I tell my health care provider before I take this medicine?  They need to know if you have any of these conditions:  -bleeding disorders  -history of stomach bleeding  -mechanical heart valve  -kidney disease  -an allergic reaction to dabigatran, other medicines, foods, dyes, or preservatives  -pregnant or trying to get pregnant  -breast-feeding  How should I use this medicine?  Take this medicine by mouth with a full glass of water. Follow the directions on the prescription label. Swallow the capsules whole. Do not break, chew, or empty the contents from the capsule. You can take it with or without food. If it upsets your stomach, take it with food. Take your medicine at regular intervals. Do not take it more often than directed. Do not stop taking except on your doctor's advice.  A special MedGuide will be given to you by the pharmacist with each prescription and refill. Be sure to read this information carefully each time.  Talk to your pediatrician regarding the use of this medicine in children. Special  care may be needed.  Overdosage: If you think you've taken too much of this medicine contact a poison control center or emergency room at once.  Overdosage: If you think you have taken too much of this medicine contact a poison control center or emergency room at once.  NOTE: This medicine is only for you. Do not share this medicine with others.  What if I miss a dose?  If you miss a dose, take it as soon as you can. If your next dose is less than 6 hours away, skip the missed dose. Do not take double or extra doses.  What may interact with this medicine?  Do not take this medicine with any of the following medications:  -certain medicines that treat or prevent blood clots like warfarin, enoxaparin, and dalteparin  -mifepristone, RU-486This medicine may also interact with the following:  -carbamazepine  -certain medicines for depression  -dronedarone  -ketoconazole  -NSAIDs, medicines for pain and inflammation, like ibuprofen or naproxen  -quinidine  -rifampin  -tipranavir  This list may not describe all possible interactions. Give your health care provider a list of all the medicines, herbs, non-prescription drugs, or dietary supplements you use. Also tell them if you smoke, drink alcohol, or use illegal drugs. Some items may interact with your medicine.  What should I watch for while using this medicine?  Visit your doctor or health care professional for regular check ups. Do not stop taking your medicine unless your doctor tells you to.  If you are going to have surgery, tell your doctor or health care professional that you are taking this medicine.  What side effects may I notice from receiving this medicine?  Side effects that you should report to your doctor or health care professional as soon as possible:  -allergic reactions like skin rash, itching or hives, swelling of the face, lips, or tongue  -breathing problems  -chest pain or chest tightness  -feeling faint or lightheaded, falls  -headache  -signs and  symptoms of bleeding such as bloody or black, tarry stools, red or dark-brown urine, spitting up blood or brown material that looks like coffee grounds, red spots on the skin, unusual bruising or bleeding from the eye, gums, or nose   Side effects that usually do not require medical attention (Report these to your doctor or health care professional if they continue or are bothersome.):  -stomach pain  -upset stomach  This list may not describe all possible side effects. Call your doctor for medical advice about side effects. You may report side effects to FDA at 9-666-FDA-8218.  Where should I keep my medicine?  Keep out of the reach of children.  Store at room temperature between 15 and 30 degrees C (59 and 86 degrees F). Keep capsules in the original container. Do not store or place capsules in any other container, such as pill boxes or pill organizers. After opening the bottle, use within 4 months. Throw away any unused medicine after 4 months.  NOTE: This sheet is a summary. It may not cover all possible information. If you have questions about this medicine, talk to your doctor, pharmacist, or health care provider.  © 2014, Elsevier/[employer]. (12/12/2013 11:55:35 AM)  Atorvastatin tablets  What is this medicine?  ATORVASTATIN (a TORE va sta tin) is known as a HMG-CoA reductase inhibitor or 'statin'. It lowers the level of cholesterol and triglycerides in the blood. This drug may also reduce the risk of heart attack, stroke, or other health problems in patients with risk factors for heart disease. Diet and lifestyle changes are often used with this drug.  This medicine may be used for other purposes; ask your health care provider or pharmacist if you have questions.  COMMON BRAND NAME(S): Lipitor  What should I tell my health care provider before I take this medicine?  They need to know if you have any of these conditions:  -frequently drink alcoholic beverages  -history of stroke, TIA  -kidney  disease  -liver disease  -muscle aches or weakness  -other medical condition  -an unusual or allergic reaction to atorvastatin, other medicines, foods, dyes, or preservatives  -pregnant or trying to get pregnant  -breast-feeding  How should I use this medicine?  Take this medicine by mouth with a glass of water. Follow the directions on the prescription label. You can take this medicine with or without food. Take your doses at regular intervals. Do not take your medicine more often than directed.  Talk to your pediatrician regarding the use of this medicine in children. While this drug may be prescribed for children as young as 10 years old for selected conditions, precautions do apply.  Overdosage: If you think you have taken too much of this medicine contact a poison control center or emergency room at once.  NOTE: This medicine is only for you. Do not share this medicine with others.  What if I miss a dose?  If you miss a dose, take it as soon as you can. If it is almost time for your next dose, take only that dose. Do not take double or extra doses.  What may interact with this medicine?  Do not take this medicine with any of the following medications:  -red yeast rice  -telaprevir  -telithromycin  -voriconazole  This medicine may also interact with the following medications:  -alcohol  -antiviral medicines for HIV or AIDS  -boceprevir  -certain antibiotics like clarithromycin, erythromycin, troleandomycin  -certain medicines for cholesterol like fenofibrate or gemfibrozil  -cimetidine  -clarithromycin  -colchicine  -cyclosporine  -digoxin  -female hormones, like estrogens or progestins and birth control pills  -grapefruit juice  -medicines for fungal infections like fluconazole, itraconazole, ketoconazole  -niacin  -rifampin  -spironolactone  This list may not describe all possible interactions. Give your health care provider a list of all the medicines, herbs, non-prescription drugs, or dietary supplements you  use. Also tell them if you smoke, drink alcohol, or use illegal drugs. Some items may interact with your medicine.  What should I watch for while using this medicine?  Visit your doctor or health care professional for regular check-ups. You may need regular tests to make sure your liver is working properly.  Tell your doctor or health care professional right away if you get any unexplained muscle pain, tenderness, or weakness, especially if you also have a fever and tiredness. Your doctor or health care professional may tell you to stop taking this medicine if you develop muscle problems. If your muscle problems do not go away after stopping this medicine, contact your health care professional.  This drug is only part of a total heart-health program. Your doctor or a dietician can suggest a low-cholesterol and low-fat diet to help. Avoid alcohol and smoking, and keep a proper exercise schedule.  Do not use this drug if you are pregnant or breast-feeding. Serious side effects to an unborn child or to an infant are possible. Talk to your doctor or pharmacist for more information.  This medicine may affect blood sugar levels. If you have diabetes, check with your doctor or health care professional before you change your diet or the dose of your diabetic medicine.  If you are going to have surgery tell your health care professional that you are taking this drug.  What side effects may I notice from receiving this medicine?  Side effects that you should report to your doctor or health care professional as soon as possible:  -allergic reactions like skin rash, itching or hives, swelling of the face, lips, or tongue  -dark urine  -fever  -joint pain  -muscle cramps, pain  -redness, blistering, peeling or loosening of the skin, including inside the mouth  -trouble passing urine or change in the amount of urine  -unusually weak or tired  -yellowing of eyes or skin  Side effects that usually do not require medical attention  (report to your doctor or health care professional if they continue or are bothersome):  -constipation  -heartburn  -stomach gas, pain, upset  This list may not describe all possible side effects. Call your doctor for medical advice about side effects. You may report side effects to FDA at 4-839-RUC-2481.  Where should I keep my medicine?  Keep out of the reach of children.  Store at room temperature between 20 to 25 degrees C (68 to 77 degrees F). Throw away any unused medicine after the expiration date.  NOTE: This sheet is a summary. It may not cover all possible information. If you have questions about this medicine, talk to your doctor, pharmacist, or health care provider.  © 2014, [employer]. (11/6/2012 9:18:24 AM)    Chronic Kidney Disease  Chronic kidney disease occurs when the kidneys are damaged over a long period. The kidneys are two organs that lie on either side of the spine between the middle of the back and the front of the abdomen. The kidneys:  · Remove wastes and extra water from the blood.  · Produce important hormones. These help keep bones strong, regulate blood pressure, and help create red blood cells.  · Balance the fluids and chemicals in the blood and tissues.  A small amount of kidney damage may not cause problems, but a large amount of damage may make it difficult or impossible for the kidneys to work the way they should. If steps are not taken to slow down the kidney damage or stop it from getting worse, the kidneys may stop working permanently. Most of the time, chronic kidney disease does not go away. However, it can often be controlled, and those with the disease can usually live normal lives.  CAUSES  The most common causes of chronic kidney disease are diabetes and high blood pressure (hypertension). Chronic kidney disease may also be caused by:  · Diseases that cause the kidneys' filters to become inflamed.  · Diseases that affect the immune system.  · Genetic  diseases.  · Medicines that damage the kidneys, such as anti-inflammatory medicines.  · Poisoning or exposure to toxic substances.  · A reoccurring kidney or urinary infection.  · A problem with urine flow. This may be caused by:  ¨ Cancer.  ¨ Kidney stones.  ¨ An enlarged prostate in males.  SIGNS AND SYMPTOMS  Because the kidney damage in chronic kidney disease occurs slowly, symptoms develop slowly and may not be obvious until the kidney damage becomes severe. A person may have a kidney disease for years without showing any symptoms. Symptoms can include:  · Swelling (edema) of the legs, ankles, or feet.  · Tiredness (lethargy).  · Nausea or vomiting.  · Confusion.  · Problems with urination, such as:  ¨ Decreased urine production.  ¨ Frequent urination, especially at night.  ¨ Frequent accidents in children who are potty trained.  · Muscle twitches and cramps.  · Shortness of breath.  · Weakness.  · Persistent itchiness.  · Loss of appetite.  · Metallic taste in the mouth.  · Trouble sleeping.  · Slowed development in children.  · Short stature in children.  DIAGNOSIS  Chronic kidney disease may be detected and diagnosed by tests, including blood, urine, imaging, or kidney biopsy tests.  TREATMENT  Most chronic kidney diseases cannot be cured. Treatment usually involves relieving symptoms and preventing or slowing the progression of the disease. Treatment may include:  · A special diet. You may need to avoid alcohol and foods that are salty and high in potassium.  · Medicines. These may:  ¨ Lower blood pressure.  ¨ Relieve anemia.  ¨ Relieve swelling.  ¨ Protect the bones.  HOME CARE INSTRUCTIONS  · Follow your prescribed diet.  Your health care provider may instruct you to limit daily salt (sodium) and protein intake.  · Take medicines only as directed by your health care provider. Do not take any new medicines (prescription, over-the-counter, or nutritional supplements) unless approved by your health care  provider. Many medicines can worsen your kidney damage or need to have the dose adjusted.    · Quit smoking if you smoke. Talk to your health care provider about a smoking cessation program.  · Keep all follow-up visits as directed by your health care provider.  · Monitor your blood pressure.  · Start or continue an exercise plan.  · Get immunizations as directed by your health care provider.  · Take vitamin and mineral supplements as directed by your health care provider.  SEEK IMMEDIATE MEDICAL CARE IF:  · Your symptoms get worse or you develop new symptoms.  · You develop symptoms of end-stage kidney disease. These include:  ¨ Headaches.  ¨ Abnormally dark or light skin.  ¨ Numbness in the hands or feet.  ¨ Easy bruising.  ¨ Frequent hiccups.  ¨ Menstruation stops.  · You have a fever.  · You have decreased urine production.  · You have pain or bleeding when urinating.  MAKE SURE YOU:  · Understand these instructions.  · Will watch your condition.  · Will get help right away if you are not doing well or get worse.  FOR MORE INFORMATION   · American Association of Kidney Patients: www.aakp.org  · National Kidney Foundation: www.kidney.org  · American Kidney Fund: www.akfinc.org  · [facility]: www.lifeoptions.org and www.kidneyschool.org     This information is not intended to replace advice given to you by your health care provider. Make sure you discuss any questions you have with your health care provider.     Document Released: 09/26/2009 Document Revised: 01/08/2016 Document Reviewed: 08/16/2013  [employer] Interactive Patient Education ©2016 [employer]      Hypertension  Hypertension, commonly called high blood pressure, is when the force of blood pumping through your arteries is too strong. Your arteries are the blood vessels that carry blood from your heart throughout your body. A blood pressure reading consists of a higher number over a lower number, such as 110/72. The higher  number (systolic) is the pressure inside your arteries when your heart pumps. The lower number (diastolic) is the pressure inside your arteries when your heart relaxes. Ideally you want your blood pressure below 120/80.  Hypertension forces your heart to work harder to pump blood. Your arteries may become narrow or stiff. Having untreated or uncontrolled hypertension can cause heart attack, stroke, kidney disease, and other problems.  RISK FACTORS  Some risk factors for high blood pressure are controllable. Others are not.   Risk factors you cannot control include:   · Race. You may be at higher risk if you are .  · Age. Risk increases with age.  · Gender. Men are at higher risk than women before age 45 years. After age 65, women are at higher risk than men.  Risk factors you can control include:  · Not getting enough exercise or physical activity.  · Being overweight.  · Getting too much fat, sugar, calories, or salt in your diet.  · Drinking too much alcohol.  SIGNS AND SYMPTOMS  Hypertension does not usually cause signs or symptoms. Extremely high blood pressure (hypertensive crisis) may cause headache, anxiety, shortness of breath, and nosebleed.  DIAGNOSIS  To check if you have hypertension, your health care provider will measure your blood pressure while you are seated, with your arm held at the level of your heart. It should be measured at least twice using the same arm. Certain conditions can cause a difference in blood pressure between your right and left arms. A blood pressure reading that is higher than normal on one occasion does not mean that you need treatment. If it is not clear whether you have high blood pressure, you may be asked to return on a different day to have your blood pressure checked again. Or, you may be asked to monitor your blood pressure at home for 1 or more weeks.  TREATMENT  Treating high blood pressure includes making lifestyle changes and possibly taking medicine.  Living a healthy lifestyle can help lower high blood pressure. You may need to change some of your habits.  Lifestyle changes may include:  · Following the DASH diet. This diet is high in fruits, vegetables, and whole grains. It is low in salt, red meat, and added sugars.  · Keep your sodium intake below 2,300 mg per day.  · Getting at least 30-45 minutes of aerobic exercise at least 4 times per week.  · Losing weight if necessary.  · Not smoking.  · Limiting alcoholic beverages.  · Learning ways to reduce stress.  Your health care provider may prescribe medicine if lifestyle changes are not enough to get your blood pressure under control, and if one of the following is true:  · You are 18-59 years of age and your systolic blood pressure is above 140.  · You are 60 years of age or older, and your systolic blood pressure is above 150.  · Your diastolic blood pressure is above 90.  · You have diabetes, and your systolic blood pressure is over 140 or your diastolic blood pressure is over 90.  · You have kidney disease and your blood pressure is above 140/90.  · You have heart disease and your blood pressure is above 140/90.  Your personal target blood pressure may vary depending on your medical conditions, your age, and other factors.  HOME CARE INSTRUCTIONS  · Have your blood pressure rechecked as directed by your health care provider.    · Take medicines only as directed by your health care provider. Follow the directions carefully. Blood pressure medicines must be taken as prescribed. The medicine does not work as well when you skip doses. Skipping doses also puts you at risk for problems.  · Do not smoke.    · Monitor your blood pressure at home as directed by your health care provider.   SEEK MEDICAL CARE IF:   · You think you are having a reaction to medicines taken.  · You have recurrent headaches or feel dizzy.  · You have swelling in your ankles.  · You have trouble with your vision.  SEEK IMMEDIATE MEDICAL  CARE IF:  · You develop a severe headache or confusion.  · You have unusual weakness, numbness, or feel faint.  · You have severe chest or abdominal pain.  · You vomit repeatedly.  · You have trouble breathing.  MAKE SURE YOU:   · Understand these instructions.  · Will watch your condition.  · Will get help right away if you are not doing well or get worse.     This information is not intended to replace advice given to you by your health care provider. Make sure you discuss any questions you have with your health care provider.     Document Released: 12/18/2006 Document Revised: 05/03/2016 Document Reviewed: 10/10/2014  [employer] Interactive Patient Education ©2016 [employer]  Atrial Fibrillation  Atrial fibrillation is a type of irregular heart rhythm (arrhythmia). During atrial fibrillation, the upper chambers of the heart (atria) quiver continuously in a chaotic pattern. This causes an irregular and often rapid heart rate.   Atrial fibrillation is the result of the heart becoming overloaded with disorganized signals that tell it to beat. These signals are normally released one at a time by a part of the right atrium called the sinoatrial node. They then travel from the atria to the lower chambers of the heart (ventricles), causing the atria and ventricles to contract and pump blood as they pass. In atrial fibrillation, parts of the atria outside of the sinoatrial node also release these signals. This results in two problems. First, the atria receive so many signals that they do not have time to fully contract. Second, the ventricles, which can only receive one signal at a time, beat irregularly and out of rhythm with the atria.   There are three types of atrial fibrillation:   · Paroxysmal. Paroxysmal atrial fibrillation starts suddenly and stops on its own within a week.  · Persistent. Persistent atrial fibrillation lasts for more than a week. It may stop on its own or with treatment.  · Permanent. Permanent  atrial fibrillation does not go away. Episodes of atrial fibrillation may lead to permanent atrial fibrillation.  Atrial fibrillation can prevent your heart from pumping blood normally. It increases your risk of stroke and can lead to heart failure.   CAUSES   · Heart conditions, including a heart attack, heart failure, coronary artery disease, and heart valve conditions.    · Inflammation of the sac that surrounds the heart (pericarditis).  · Blockage of an artery in the lungs (pulmonary embolism).  · Pneumonia or other infections.  · Chronic lung disease.  · Thyroid problems, especially if the thyroid is overactive (hyperthyroidism).  · Caffeine, excessive alcohol use, and use of some illegal drugs.    · Use of some medicines, including certain decongestants and diet pills.  · Heart surgery.    · Birth defects.    Sometimes, no cause can be found. When this happens, the atrial fibrillation is called lone atrial fibrillation. The risk of complications from atrial fibrillation increases if you have lone atrial fibrillation and you are age 60 years or older.  RISK FACTORS  · Heart failure.  · Coronary artery disease.  · Diabetes mellitus.    · High blood pressure (hypertension).    · Obesity.    · Other arrhythmias.    · Increased age.  SIGNS AND SYMPTOMS   · A feeling that your heart is beating rapidly or irregularly.    · A feeling of discomfort or pain in your chest.    · Shortness of breath.    · Sudden light-headedness or weakness.    · Getting tired easily when exercising.    · Urinating more often than normal (mainly when atrial fibrillation first begins).    In paroxysmal atrial fibrillation, symptoms may start and suddenly stop.  DIAGNOSIS   Your health care provider may be able to detect atrial fibrillation when taking your pulse. Your health care provider may have you take a test called an ambulatory electrocardiogram (ECG). An ECG records your heartbeat patterns over a 24-hour period. You may also have  other tests, such as:  · Transthoracic echocardiogram (TTE). During echocardiography, sound waves are used to evaluate how blood flows through your heart.  · Transesophageal echocardiogram (MIRA).  · Stress test. There is more than one type of stress test. If a stress test is needed, ask your health care provider about which type is best for you.  · Chest X-ray exam.  · Blood tests.  · Computed tomography (CT).  TREATMENT   Treatment may include:  · Treating any underlying conditions. For example, if you have an overactive thyroid, treating the condition may correct atrial fibrillation.  · Taking medicine. Medicines may be given to control a rapid heart rate or to prevent blood clots, heart failure, or a stroke.  · Having a procedure to correct the rhythm of the heart:  ¨ Electrical cardioversion. During electrical cardioversion, a controlled, low-energy shock is delivered to the heart through your skin. If you have chest pain, very low blood pressure, or sudden heart failure, this procedure may need to be done as an emergency.  ¨ Catheter ablation. During this procedure, heart tissues that send the signals that cause atrial fibrillation are destroyed.  ¨ Surgical ablation. During this surgery, thin lines of heart tissue that carry the abnormal signals are destroyed. This procedure can either be an open-heart surgery or a minimally invasive surgery. With the minimally invasive surgery, small cuts are made to access the heart instead of a large opening.  ¨ Pulmonary venous isolation. During this surgery, tissue around the veins that carry blood from the lungs (pulmonary veins) is destroyed. This tissue is thought to carry the abnormal signals.  HOME CARE INSTRUCTIONS   · Take medicines only as directed by your health care provider. Some medicines can make atrial fibrillation worse or recur.  · If blood thinners were prescribed by your health care provider, take them exactly as directed. Too much blood-thinning  medicine can cause bleeding. If you take too little, you will not have the needed protection against stroke and other problems.  · Perform blood tests at home if directed by your health care provider. Perform blood tests exactly as directed.  · Quit smoking if you smoke.  · Do not drink alcohol.  · Do not drink caffeinated beverages such as coffee, soda, and some teas. You may drink decaffeinated coffee, soda, or tea.    · Maintain a healthy weight. Do not use diet pills unless your health care provider approves. They may make heart problems worse.    · Follow diet instructions as directed by your health care provider.  · Exercise regularly as directed by your health care provider.  · Keep all follow-up visits as directed by your health care provider. This is important.  PREVENTION   The following substances can cause atrial fibrillation to recur:   · Caffeinated beverages.  · Alcohol.  · Certain medicines, especially those used for breathing problems.  · Certain herbs and herbal medicines, such as those containing ephedra or ginseng.  · Illegal drugs, such as cocaine and amphetamines.  Sometimes medicines are given to prevent atrial fibrillation from recurring. Proper treatment of any underlying condition is also important in helping prevent recurrence.   SEEK MEDICAL CARE IF:  · You notice a change in the rate, rhythm, or strength of your heartbeat.  · You suddenly begin urinating more frequently.  · You tire more easily when exerting yourself or exercising.  SEEK IMMEDIATE MEDICAL CARE IF:   · You have chest pain, abdominal pain, sweating, or weakness.  · You feel nauseous.  · You have shortness of breath.  · You suddenly have swollen feet and ankles.  · You feel dizzy.  · Your face or limbs feel numb or weak.  · You have a change in your vision or speech.  MAKE SURE YOU:   · Understand these instructions.  · Will watch your condition.  · Will get help right away if you are not doing well or get worse.     This  "information is not intended to replace advice given to you by your health care provider. Make sure you discuss any questions you have with your health care provider.     Document Released: 12/18/2006 Document Revised: 01/08/2016 Document Reviewed: 04/13/2016  [employer] Interactive Patient Education ©2016 [employer]  Dysphagia Diet Level 1, Pureed  The dysphasia level 1 diet includes foods that are completely pureed and smooth. The foods have a pudding-like texture, such as the texture of pureed pancakes, mashed potatoes, and yogurt. The diet does not include foods with lumps or coarse textures. Liquids should be smooth and may either be thin, nectar-thick, honey-like, or spoon-thick.  This diet is helpful for people with moderate to severe swallowing problems. It reduces the risk of food getting caught in the windpipe, trachea, or lungs. You may need help or supervision during meals while following this diet.  WHAT DO I NEED TO KNOW ABOUT THIS DIET?  Foods  · You may eat foods that are soft and have a pudding-like texture. If a food does not have this texture, you may be able to eat the food after:  ¨ Pureeing it. This can be done with a  or whisk.  ¨ Moistening it with liquid. For example, you may have bread if you soak it in milk or syrup.  · Avoid foods that are hard, dry, sticky, chunky, lumpy, or stringy. Also avoid foods with nuts, seeds, raisins, skins, and pulp.  · Do not eat foods that you have to chew. If you have to chew the food, then you cannot eat it.  · Eat a variety of foods to get all the nutrients you need.  Liquids  · You may drink liquids that are smooth. Your health care provider will tell you if you should drink thin or thickened liquids.  · To thicken a liquid, use a food and beverage thickener or a thickening food. Thickened liquids are usually a \"pudding-like\" consistency.  · Thin liquids include fruit juices, milk, coffee, tea, yogurts, shakes, and similar foods that melt to thin " liquid at room temperature.  · Avoid liquids with seeds, pulp, or chunks.  See your dietitian or health care provider regularly for help with your dietary changes.  WHAT FOODS CAN I EAT?  Grains  Store-bought soft breads, pancakes, and English toast that have a smooth, moist texture and do not have nuts or seeds (you will need to moisten the food with liquid). Cooked cereals that have a pudding-like consistency, such as cream of wheat or farina (no oatmeal). Pureed, well-cooked pasta, rice, and plain bread stuffing.  Vegetables  Pureed vegetables. Soft avocado. Smooth tomato paste or sauce. Strained or pureed soups (these may need to be thickened as directed). Mashed or pureed potatoes without skin (can be seasoned with butter, smooth gravy, margarine, or sour cream).  Fruits  Pureed fruits such as melons and apples without seeds or pulp. Mashed bananas. Smooth tomato paste or sauce. Fruit juices without pulp or seeds. Strained or pureed soups.  Meat and Other Protein Sources  Pureed meat. Smooth aburto or liverwurst. Smooth souffles. Pureed beans (such as lentils). Pureed eggs.  Dairy  Yogurt. Smooth cheese sauces. Milk (may need to be thickened). Nutritional dairy drinks or shakes. Ask your health care provider whether you can have ice cream.  Condiments  Finely ground salt, pepper, and other ground spices.  Sweets/Desserts  Smooth puddings and custards. Pureed desserts. Souffles. Whipped topping. Ask your health care provider whether you can have frozen desserts.  Fats and Oils  Butter. Margarine. Smooth and strained gravy. Sour cream. Mayonnaise. Cream cheese. Whipped topping. Smooth sauces (such as white sauce, cheese sauce, or hollandaise sauce).  The items listed above may not be a complete list of recommended foods or beverages. Contact your dietitian for more options.  WHAT FOODS ARE NOT RECOMMENDED?  Grains  Oatmeal. Dry cereals. Hard breads.  Vegetables  Whole vegetables. Stringy vegetables (such as celery).  Thin tomato sauce.  Fruits  Whole fresh, frozen, canned, or dried fruits that have not been pureed. Stringy fruits (such as pineapple).  Meat and Other Protein Sources  Whole or ground meat, fish, or poultry. Dried or cooked lentils or legumes that have been cooked but not mashed or pureed. Non-pureed eggs. Nuts and seeds. Peanut butter.  Dairy  Non-pureed cheese. Dairy products with lumps or chunks. Ask your health care provider whether you can have ice cream.  Condiments  Coarse or seeded herbs and spices.  Sweets/Desserts  [place] preserves. Jams with seeds. Solid desserts. Sticky, chewy sweets (such as licorice and caramel). Ask your health care provider whether you can have frozen desserts.  Fats and Oils  Sauces of fats with lumps or chunks.  The items listed above may not be a complete list of foods and beverages to avoid. Contact your dietitian for more information.     This information is not intended to replace advice given to you by your health care provider. Make sure you discuss any questions you have with your health care provider.     Document Released: 12/18/2006 Document Revised: 01/08/2016 Document Reviewed: 12/01/2014  [employer] Interactive Patient Education ©2016 [employer]  Thickening Liquids for Dysphagia Diet  If you are on the dysphagia diet, you may need to thicken drinks, soups, foods that melt at room temperature, and other liquids before you drink or eat them. Thickening liquids makes them easier to swallow. It also reduces the risk of liquid traveling to your lungs.  To make a thickened liquid you will need to add a commercial thickening product or a soft food to the liquid until it reaches the consistency it needs to be. Your health care provider or dietitian will explain to you the consistency you need to aim for. Liquid consistencies include:  · Thin. Thin liquids include most drinks (such as water, milk, tea, soda, juice, carbonated drinks), as well as ice cream, sherbet, sorbet,  ice pops, and broth-based soups.  · Nectar-like. Nectar-like liquids include maple syrup and creamy soup.  · Honey-like. Honey-like liquids are made to be runny but are thick like honey. They cannot be sipped through a straw.  · Spoon-thick. Spoon-thick liquids are thick, like pudding.  MY PLAN  I should thicken my liquids to a _______________ consistency.  DIET GUIDELINES  · Thicken liquids to the consistency your health care provider recommends.  · Follow your dietitian's or health care provider's recommendation on how to thicken your liquids.  · See your dietitian or health care provider regularly for help with your dietary changes.  HOW CAN I THICKEN MY LIQUIDS?  Liquids can be thickened with a commercial food and beverage thickener or with a soft food.  Commercial Food and Beverage Thickeners  A food and beverage thickener is a powder or gel that makes a food or beverage thicker. Thickeners are sold at pharmacies, medical supply stores, some grocery stores, and online. They can be added to both hot and cold liquids and do not change the taste of the liquid. Ask your health care provider or dietitian for a complete list of commercial thickeners.  Each thickening product is different. Some need to be blended into a liquid with a  while others can be stirred into a liquid with a fork or spoon. Follow the instructions on the product label.  Soft Foods  Some foods such as soups, casseroles, and gravies can be thickened with soft foods. Soft foods include:  · Baby cereal.  · Gravy powder.  · Mashed potato.  · Pureed baby food.  · Instant potato flakes.  · Powdered sauce mixes (such as cheese mixes).  · Flour.  To use one of these soft food items, stir or mix them into the thin liquid until it reaches the desired thickness. Start with a small amount and adjust soft food and liquid as necessary.  Note: Flour works best with warm liquids, such as broth. To thicken a liquid with flour, make a paste out of flour  and water. Cook or warm your liquid and add the paste to it. Stir until the mixture thickens.  WHAT ARE SOME TIPS TO MAKE THICKENING LIQUIDS EASIER?  · Take thickeners with you when eating out or traveling.  · If a liquid gets too thick, add more of the thinner liquid until the desired consistency is reached.  · Consider purchasing pre-made thickened drinks.  · Consider using a thickening product to make your own frozen desserts.     This information is not intended to replace advice given to you by your health care provider. Make sure you discuss any questions you have with your health care provider.     Document Released: 06/18/2013 Document Revised: 12/23/2014 Document Reviewed: 12/01/2014  Elsevier Interactive Patient Education ©2016 [employer]

## 2018-02-10 NOTE — PROGRESS NOTES
Received bedside report from PM nurse. Assumed patient care. Chart reviewed. Pt was resting in bed. A&O x 2. Pt is confused, thinks he is in his 40's, unable to state year. No concerns, complaints or distress. Patient denies pain. POC updated with pt and on the patient communication board. Bed locked and in the lowest position. Call light within reach. Tele box on. Will continue to monitor.

## 2018-02-10 NOTE — PROGRESS NOTES
NEUROLOGY INPATIENT PROGRESS NOTE      CHIEF COMPLAINT:  Found unresponsive.      DOS  Pt was seen and examined on 02/10/18    INTERVAL EVENTS:  Pt has remained neurologically stable overnight with no memory of yesterday's event but know that he was told he had a stroke.     ROS:  No headaches  No fevers of chills    MEDICATIONS    Current Facility-Administered Medications:   •  aspirin (ASA) chewable tab 81 mg, 81 mg, Oral, DAILY, Judith Hernández M.D., 81 mg at 02/10/18 1005  •  senna-docusate (PERICOLACE or SENOKOT S) 8.6-50 MG per tablet 2 Tab, 2 Tab, Oral, BID, 2 Tab at 02/09/18 2017 **AND** polyethylene glycol/lytes (MIRALAX) PACKET 1 Packet, 1 Packet, Oral, QDAY PRN **AND** magnesium hydroxide (MILK OF MAGNESIA) suspension 30 mL, 30 mL, Oral, QDAY PRN **AND** bisacodyl (DULCOLAX) suppository 10 mg, 10 mg, Rectal, QDAY PRN, Evangelist Burciaga M.D.  •  ondansetron (ZOFRAN) syringe/vial injection 4 mg, 4 mg, Intravenous, Q4HRS PRN, Evangelist Burciaga M.D.  •  ondansetron (ZOFRAN ODT) dispertab 4 mg, 4 mg, Oral, Q4HRS PRN, Evangelist Burciaga M.D.  •  acetaminophen (TYLENOL) tablet 650 mg, 650 mg, Oral, Q6HRS PRN, Evangelist Burciaga M.D.  •  dabigatran (PRADAXA) capsule 75 mg, 75 mg, Oral, BID, Evangelist Burciaga M.D., 75 mg at 02/10/18 0819  •  escitalopram (LEXAPRO) tablet 10 mg, 10 mg, Oral, DAILY, Evangelist Burciaga M.D., 10 mg at 02/10/18 0819  •  omeprazole (PRILOSEC) capsule 20 mg, 20 mg, Oral, DAILY, Evangelist Burciaga M.D., 20 mg at 02/10/18 0819  •  psyllium (METAMUCIL/KONSYL) 1 Packet, 1 Packet, Oral, DAILY, Evangelist Burciaga M.D., 1 Packet at 02/10/18 0819  •  tamsulosin (FLOMAX) capsule 0.4 mg, 0.4 mg, Oral, AFTER BREAKFAST, Evangelist Burciaga M.D., 0.4 mg at 02/10/18 0819  •  [START ON 2/16/2018] vitamin D (Ergocalciferol) (DRISDOL) capsule 50,000 Units, 50,000 Units, Oral, Q FRIDAY, Evangelist Burciaga M.D.  •  atorvastatin (LIPITOR) tablet 40 mg, 40 mg, Oral, Q EVENING, Evangelist Burciaga M.D., 40  mg at 02/09/18 2018      PHYSICAL EXAM  Vitals:    02/09/18 1936 02/10/18 0010 02/10/18 0408 02/10/18 0800   BP: 154/80 104/63 125/76 123/79   Pulse: 71 60 (!) 59 61   Resp: 18 16 16 18   Temp: 36.2 °C (97.2 °F) 36.2 °C (97.1 °F) 36.1 °C (96.9 °F) 36 °C (96.8 °F)   SpO2: 97% 98% 96% 96%   Weight: 80.4 kg (177 lb 4 oz)      Height:         PT is alert, lying in bed, NAD  Neuro: pt is alert to self and hospital only, does not know year or president, states he is 50.   CN: PERRLA, EOMI, no facial asymemtry  Motor: 5/5 throughout in all 4 extremities  Coordination: FNF is intact b/l    Sensation: intact to soft touch in all 4 extremities    LABS:        Lab Results   Component Value Date/Time    CHOLSTRLTOT 86 (L) 02/10/2018 03:40 AM    LDL 44 02/10/2018 03:40 AM    HDL 27 (A) 02/10/2018 03:40 AM    TRIGLYCERIDE 77 02/10/2018 03:40 AM       UA: negative.      IMAGING STUDIES:      IMPRESSION: Jie Nunez is a 89 y.o. Male with a-fib and pacemaker on Pradaxa, CHF and dementia who was found unrepsonsive, slumped to the left. He know appears to be close to baseline with no focal deficits although he has some memory loss c/w his dementia    Differential:   TIA or small stroke due to a-fib versus possible seizures?     Recommend:   1) Repeat CT head since patient is unable to get a pacemaker due to his  Pacemaker  2) Continue Pradaxa. Will stop ASA.    3) Continue statin   4) May consider EEG as outpatient or on Monday if he is still here. However, if patient is back to baseline, can work on discharge planning.     Diane Cortes M.D.    Neurology

## 2018-02-10 NOTE — CARE PLAN
Problem: Safety  Goal: Will remain free from falls  Outcome: PROGRESSING AS EXPECTED  Educated pt on utilizing call light. Pt does not call appropriately throughout the night.  Verified bed alarm is in place.  Assessed pt's gait as stable, however, pt will need a one person assist due to his confusion and risk for falling.    Problem: Infection  Goal: Will remain free from infection  Outcome: PROGRESSING AS EXPECTED  Encouraged pt wash hands after using the bathroom.    Problem: Urinary Elimination:  Goal: Ability to reestablish a normal urinary elimination pattern will improve  Outcome: PROGRESSING AS EXPECTED  Reminded pt that on this floor, all intake and output is measured and recorded.    Problem: Knowledge Deficit  Goal: Knowledge of disease process/condition, treatment plan, diagnostic tests, and medications will improve  Outcome: PROGRESSING AS EXPECTED  Educated pt on night time medication. Family at bedside understood indication and agreed with treatment.  Educated pt on indication for every 4 hour neuro checks. Pt understands that it is for continuous monitoring for worsening s/s of TIA.

## 2018-02-10 NOTE — PROGRESS NOTES
Report received at beside. RN assumed care. Chart reviewed. Patient is resting in bed with family at bedside. Family and patient updated on plan of care.  Assessment completed. AO x 1 only to self. Reoriented pt to place, situation, and year. Pain 0/10. Patient has no concerns or complaints at this time. Telemetry box verified on. Bed alarm verified on. Call light within reach. Bed in lowest position. Non slip socks on.

## 2018-02-10 NOTE — PROGRESS NOTES
Pt is on Pradaxa 75 mg. Discussed a concern with an order for an additional Asa 325 mg with pharmacy and Dr. Hernández. Aspirin 325 mg held per MD at this time.

## 2018-02-10 NOTE — DISCHARGE PLANNING
Transport arranged with [facility]. Patient will be leaving today @[phone] via the [facility] van going back home. KLARISSA Joyner notified.

## 2018-02-10 NOTE — DISCHARGE PLANNING
Medical Social Work    Referral: Transportation Assistance     Intervention: pt will discharge to [facility] today from [id number]. [facility] are aware of transfer time along with bedside.     Plan: Pt will transfer back to his assisted living at [facility]

## 2018-02-11 NOTE — DISCHARGE SUMMARY
CHIEF COMPLAINT ON ADMISSION  Chief Complaint   Patient presents with   • Possible Stroke     BIB EMS from [facility]. pt was found to be unresponsive beginning at 0900 for approximately 15 minutes. pt was sitting up in chair with eyes open, unable to speak.        CODE STATUS  Full    HPI & HOSPITAL COURSE  This is a 89 y.o. male with a history of mild dementia who lives in a memory care facility, chronic atrial fibrillation on pradaxa, hypertension, CKD who presented with 15 minutes of left hemiparesis and aphasia. By the time of his arrival here his symptoms had cleared-he was not a candidate for thrombolytics. His neuro exam remained stable, he was evaluated by speech, occupational and physical therapies and felt to be at baseline. He was seen by neurology initially was treated with full dose aspirin in addition to the Pradaxa, however at discharge the neurologist Dr. Diane Cortes did not wish to continue any aspirin. He therefore was discharged on Pradaxa and Lipitor was added to his regimen.    Observation status admission    Therefore, he is discharged in good and stable condition with close outpatient follow-up.    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 *      FOLLOW UP  Future Appointments  Date Time Provider Department Center   2/27/2018 1:15 PM PACER CHECK-[facility] B 2 [facility] None   2/27/2018 2:00 PM Gretta  KAREN Castillo M.D. [facility] None     Thelma Cervantes M.D.  [address]  [place] [address]  [phone]    Schedule an appointment as soon as possible for a visit in 1 week        MEDICATIONS ON DISCHARGE   Jie Nunez   Home Medication Instructions NEEL:[id number]    Printed on:02/11/18 2531   Medication Information                      ascorbic acid (ASCORBIC ACID) 500 MG Tab  Take 500 mg by mouth every day.             atorvastatin (LIPITOR) 40 MG Tab  Take 1 Tab by mouth every evening.             dabigatran (PRADAXA) 75 MG Cap capsule  Take 75 mg by mouth 2 Times a Day.             escitalopram (LEXAPRO) 10 MG Tab  Take 1 Tab by mouth every day.             losartan (COZAAR) 50 MG Tab  Take 50 mg by mouth every day.             memantine (NAMENDA) 5 MG Tab  Take 5 mg by mouth every evening.             omeprazole (PRILOSEC) 20 MG delayed-release capsule  Take 1 Cap by mouth every day.             psyllium (METAMUCIL) 58.12 % Pack  Take 1 Packet by mouth every day.             tamsulosin (FLOMAX) 0.4 MG capsule  Take 1 Cap by mouth ONE-HALF HOUR AFTER BREAKFAST.             vitamin D, Ergocalciferol, (DRISDOL) 02913 units Cap capsule  Take 50,000 Units by mouth every Friday.                 DIET  Cardiac    ACTIVITY  As tolerated      CONSULTATIONS  Neurology    PROCEDURES  None    LABORATORY  Lab Results   Component Value Date/Time    SODIUM 136 02/10/2018 03:40 AM    POTASSIUM 4.1 02/10/2018 03:40 AM    CHLORIDE 104 02/10/2018 03:40 AM    CO2 24 02/10/2018 03:40 AM    GLUCOSE 98 02/10/2018 03:40 AM    BUN 31 (H) 02/10/2018 03:40 AM    CREATININE 1.57 (H) 02/10/2018 03:40 AM        Lab Results   Component Value Date/Time    WBC 8.1 02/10/2018 03:40 AM    HEMOGLOBIN 13.3 (L) 02/10/2018 03:40 AM    HEMATOCRIT 39.1 (L) 02/10/2018 03:40 AM    PLATELETCT 128 (L) 02/10/2018 03:40 AM        Total time of the discharge process exceeds 40 minutes

## 2018-02-11 NOTE — PROGRESS NOTES
Pt documented to be confused.  Pt discharged via w/c with [facility] transport to [facility].  Identification checked and copy made and placed in chart.  All personal belongings with patient.  D/C instructions given and copy placed in chart.  Tele box and IV removed.

## 2018-02-15 ENCOUNTER — TELEPHONE (OUTPATIENT)
Dept: CARDIOLOGY | Facility: MEDICAL CENTER | Age: 83
End: 2018-02-15

## 2018-02-15 NOTE — TELEPHONE ENCOUNTER
Left messages on patients phone number in regards to NP appt with [facility] on 2/27/18. Received message from pts son Fabian that patient /was seen at [facility] by Dr. Hunter Ambrocio. Sent request of records to medical records office phone number [phone].

## 2018-02-27 ENCOUNTER — OFFICE VISIT (OUTPATIENT)
Dept: CARDIOLOGY | Facility: MEDICAL CENTER | Age: 83
End: 2018-02-27
Payer: MEDICARE

## 2018-02-27 ENCOUNTER — NON-PROVIDER VISIT (OUTPATIENT)
Dept: CARDIOLOGY | Facility: MEDICAL CENTER | Age: 83
End: 2018-02-27
Payer: MEDICARE

## 2018-02-27 VITALS
HEART RATE: 80 BPM | BODY MASS INDEX: 23.86 KG/M2 | DIASTOLIC BLOOD PRESSURE: 70 MMHG | SYSTOLIC BLOOD PRESSURE: 120 MMHG | WEIGHT: 152 LBS | HEIGHT: 67 IN | OXYGEN SATURATION: 96 %

## 2018-02-27 DIAGNOSIS — I50.31 ACUTE DIASTOLIC CHF (CONGESTIVE HEART FAILURE) (HCC): ICD-10-CM

## 2018-02-27 DIAGNOSIS — I48.21 PERMANENT ATRIAL FIBRILLATION (HCC): ICD-10-CM

## 2018-02-27 DIAGNOSIS — Z51.5 END OF LIFE CARE: ICD-10-CM

## 2018-02-27 DIAGNOSIS — Z95.0 CARDIAC PACEMAKER IN SITU: ICD-10-CM

## 2018-02-27 DIAGNOSIS — I49.5 SICK SINUS SYNDROME (HCC): ICD-10-CM

## 2018-02-27 PROBLEM — E87.20 LACTIC ACIDOSIS: Status: RESOLVED | Noted: 2017-09-05 | Resolved: 2018-02-27

## 2018-02-27 PROBLEM — R45.1 AGITATION: Status: RESOLVED | Noted: 2017-09-11 | Resolved: 2018-02-27

## 2018-02-27 PROBLEM — J96.01 ACUTE RESPIRATORY FAILURE WITH HYPOXIA (HCC): Status: RESOLVED | Noted: 2017-09-05 | Resolved: 2018-02-27

## 2018-02-27 PROBLEM — R93.89 ABNORMAL CAT SCAN: Status: RESOLVED | Noted: 2018-02-09 | Resolved: 2018-02-27

## 2018-02-27 PROCEDURE — 99204 OFFICE O/P NEW MOD 45 MIN: CPT | Mod: 25 | Performed by: INTERNAL MEDICINE

## 2018-02-27 PROCEDURE — 93279 PRGRMG DEV EVAL PM/LDLS PM: CPT | Performed by: INTERNAL MEDICINE

## 2018-02-27 RX ORDER — LOSARTAN POTASSIUM 50 MG/1
50 TABLET ORAL DAILY
Qty: 90 TAB | Refills: 3 | Status: SHIPPED | OUTPATIENT
Start: 2018-02-27 | End: 2018-09-10

## 2018-02-27 RX ORDER — ATORVASTATIN CALCIUM 40 MG/1
40 TABLET, FILM COATED ORAL EVERY EVENING
Qty: 90 TAB | Refills: 12 | Status: SHIPPED | OUTPATIENT
Start: 2018-02-27 | End: 2018-09-10

## 2018-02-27 RX ORDER — HYDROCHLOROTHIAZIDE 25 MG/1
12.5 TABLET ORAL DAILY
Qty: 90 TAB | Refills: 4 | Status: SHIPPED | OUTPATIENT
Start: 2018-02-27 | End: 2018-05-25 | Stop reason: SDUPTHER

## 2018-02-27 RX ORDER — DABIGATRAN ETEXILATE 75 MG/1
75 CAPSULE ORAL 2 TIMES DAILY
Qty: 180 CAP | Refills: 3 | Status: SHIPPED | OUTPATIENT
Start: 2018-02-27 | End: 2018-07-06

## 2018-02-27 RX ORDER — ALBUTEROL SULFATE 90 UG/1
2 AEROSOL, METERED RESPIRATORY (INHALATION) EVERY 6 HOURS PRN
COMMUNITY
End: 2018-02-27 | Stop reason: SDUPTHER

## 2018-02-27 RX ORDER — MEMANTINE HYDROCHLORIDE 5 MG/1
5 TABLET ORAL EVERY EVENING
Qty: 60 TAB | Refills: 3 | Status: SHIPPED | OUTPATIENT
Start: 2018-02-27 | End: 2018-09-10

## 2018-02-27 RX ORDER — ALBUTEROL SULFATE 90 UG/1
2 AEROSOL, METERED RESPIRATORY (INHALATION) EVERY 6 HOURS PRN
Qty: 8.5 G | Refills: 4 | Status: SHIPPED | OUTPATIENT
Start: 2018-02-27 | End: 2018-09-10

## 2018-02-27 RX ORDER — HYDROCHLOROTHIAZIDE 25 MG/1
12.5 TABLET ORAL DAILY
COMMUNITY
End: 2018-02-27 | Stop reason: SDUPTHER

## 2018-02-27 NOTE — PROGRESS NOTES
Subjective:   Jie Nunez is a 89 y.o. male who presents today   As a patient    Here in self-referral in regards to his history of atrial fibrillation on anti-coagulation with a pacemaker placed in 2015 for sick sinus syndrome    In the hospital for stroke, resolved. No change in medications outside of Lipitor  No aspirin, his son is a gastroenterologist    In with his son-in-law    No setbacks, lives at assisted living/nursing home memory care  Amazing story, from [facility] came in he was 10 years old to [facility]    Past Medical History:   Diagnosis Date   • Arrhythmia    • Atrial fibrillation (CMS-HCC)    • Cancer (CMS-HCC)     prostate   • Congestive heart failure (CMS-HCC)    • Hypertension      Past Surgical History:   Procedure Laterality Date   • CHOLECYSTECTOMY     • GYN SURGERY      leyda   • LUMBAR FUSION POSTERIOR     • OTHER CARDIAC SURGERY      A-fib   • OTHER CARDIAC SURGERY      chf   • OTHER ORTHOPEDIC SURGERY      lumbar lami   • SHOULDER ARTHROSCOPY       Family History   Problem Relation Age of Onset   • Heart Disease Neg Hx    • Heart Failure Neg Hx      History   Smoking Status   • Never Smoker   Smokeless Tobacco   • Never Used     Allergies   Allergen Reactions   • Beta Adrenergic Blockers Anaphylaxis     HR goes into teens, Cardiology wants him to never have another beta blocker   • Penicillins      Unknown reaction       Outpatient Encounter Prescriptions as of 2/27/2018   Medication Sig Dispense Refill   • hydroCHLOROthiazide (HYDRODIURIL) 25 MG Tab Take 12.5 mg by mouth every day.     • albuterol (PROAIR HFA) 108 (90 Base) MCG/ACT Aero Soln inhalation aerosol Inhale 2 Puffs by mouth every 6 hours as needed for Shortness of Breath.     • atorvastatin (LIPITOR) 40 MG Tab Take 1 Tab by mouth every evening. 30 Tab 12   • losartan (COZAAR) 50 MG Tab Take 50 mg by mouth every day.     • psyllium (METAMUCIL) 58.12 % Pack Take 1 Packet by mouth every day.     • dabigatran (PRADAXA) 75 MG  "Cap capsule Take 75 mg by mouth 2 Times a Day.     • vitamin D, Ergocalciferol, (DRISDOL) 88603 units Cap capsule Take 50,000 Units by mouth every Friday.     • memantine (NAMENDA) 5 MG Tab Take 5 mg by mouth every evening.     • escitalopram (LEXAPRO) 10 MG Tab Take 1 Tab by mouth every day. 30 Tab 3   • omeprazole (PRILOSEC) 20 MG delayed-release capsule Take 1 Cap by mouth every day. 30 Cap 11   • tamsulosin (FLOMAX) 0.4 MG capsule Take 1 Cap by mouth ONE-HALF HOUR AFTER BREAKFAST. 30 Cap    • ascorbic acid (ASCORBIC ACID) 500 MG Tab Take 500 mg by mouth every day.       No facility-administered encounter medications on file as of 2/27/2018.      Review of Systems   Unable to perform ROS: Dementia        Objective:   /70   Pulse 80   Ht 1.702 m (5' 7\")   Wt 68.9 kg (152 lb)   SpO2 96%   BMI 23.81 kg/m²     Physical Exam   Constitutional: He is oriented to person, place, and time.   Thin, responds to commands and answers questions appropriately, no acute distress, mild right-sided pill-rolling tremor of the hand   HENT:   Head: Normocephalic and atraumatic.   Eyes: EOM are normal. Pupils are equal, round, and reactive to light.   Neck: Neck supple. No JVD present.   Cardiovascular: Normal rate and intact distal pulses.  Exam reveals no gallop and no friction rub.    No murmur heard.  Pulmonary/Chest: No respiratory distress. He exhibits no tenderness.   Abdominal: Bowel sounds are normal. He exhibits no distension.   Musculoskeletal: He exhibits no edema (chronic stasis changes) or tenderness.   Lymphadenopathy:     He has no cervical adenopathy.   Neurological: He is alert and oriented to person, place, and time. He exhibits normal muscle tone. Coordination normal.   Skin: Skin is warm and dry. No rash noted.       Assessment:     1. Acute diastolic CHF (congestive heart failure) (CMS-HCC)     2. Permanent atrial fibrillation (CMS-HCC)         Medical Decision Making:  Today's Assessment / Status / " Plan:     Spent more than 35 minutes going over his extensive chart  Prior cardiologist moved away    Atrial fibrillation  Recent echo reviewed normal heart functiona few valve disease  Rate control  Anticoagulation with a close eye on his GFR as well as his CBC. Up-to-date on blood work from his last hospital stay last month    Reassurance given end-of-life care broached  Advance directives reviewed and discussed and filed   Comfort and palliation    Pacemaker check  Reviewed no changes made device is appropriate and functioning normally    Questions answered RTC 6 months sooner with concerns

## 2018-05-25 DIAGNOSIS — I10 ESSENTIAL HYPERTENSION: Primary | ICD-10-CM

## 2018-05-25 RX ORDER — HYDROCHLOROTHIAZIDE 12.5 MG/1
12.5 TABLET ORAL DAILY
Qty: 90 TAB | Refills: 3 | Status: SHIPPED | OUTPATIENT
Start: 2018-05-25 | End: 2018-07-06

## 2018-07-06 ENCOUNTER — APPOINTMENT (OUTPATIENT)
Dept: RADIOLOGY | Facility: MEDICAL CENTER | Age: 83
End: 2018-07-06
Attending: EMERGENCY MEDICINE
Payer: MEDICARE

## 2018-07-06 ENCOUNTER — HOSPITAL ENCOUNTER (EMERGENCY)
Facility: MEDICAL CENTER | Age: 83
End: 2018-07-06
Attending: EMERGENCY MEDICINE
Payer: MEDICARE

## 2018-07-06 VITALS
BODY MASS INDEX: 24.26 KG/M2 | OXYGEN SATURATION: 97 % | RESPIRATION RATE: 18 BRPM | DIASTOLIC BLOOD PRESSURE: 90 MMHG | HEART RATE: 60 BPM | HEIGHT: 67 IN | WEIGHT: 154.54 LBS | TEMPERATURE: 98.1 F | SYSTOLIC BLOOD PRESSURE: 153 MMHG

## 2018-07-06 DIAGNOSIS — T14.8XXA HEMATOMA: ICD-10-CM

## 2018-07-06 PROCEDURE — 73080 X-RAY EXAM OF ELBOW: CPT | Mod: LT

## 2018-07-06 PROCEDURE — 99284 EMERGENCY DEPT VISIT MOD MDM: CPT

## 2018-07-06 PROCEDURE — 70450 CT HEAD/BRAIN W/O DYE: CPT

## 2018-07-06 RX ORDER — TIOTROPIUM BROMIDE 18 UG/1
18 CAPSULE ORAL; RESPIRATORY (INHALATION) DAILY
Status: SHIPPED | COMMUNITY
End: 2018-09-10

## 2018-07-06 RX ORDER — TAMSULOSIN HYDROCHLORIDE 0.4 MG/1
0.4 CAPSULE ORAL DAILY
Status: SHIPPED | COMMUNITY
End: 2018-09-10

## 2018-07-06 ASSESSMENT — PAIN SCALES - GENERAL: PAINLEVEL_OUTOF10: 8

## 2018-07-06 NOTE — ED PROVIDER NOTES
ED Provider Note    CHIEF COMPLAINT  Chief Complaint   Patient presents with   • T-5000 FALL     slipped and fell in the [place]   • Arm Injury     Left       HPI  Jie Nunez is a 89 y.o. male who presents with an elbow injury.  He apparently fell he says he did not hit his head and was able to get up on his own.  He has a large swelling around his left elbow.  He also said he had some left shoulder pain but that is better.  History is somewhat limited he does have some underlying dementia.  He is on Pradaxa.      REVIEW OF SYSTEMS  Positive for left elbow swelling, negative for headache.     PAST MEDICAL HISTORY   has a past medical history of Arrhythmia; Atrial fibrillation (HCC); Cancer (HCC); Congestive heart failure (HCC); and Hypertension.    SOCIAL HISTORY  Social History     Social History Main Topics   • Smoking status: Never Smoker   • Smokeless tobacco: Never Used   • Alcohol use No   • Drug use: No   • Sexual activity: Not on file       SURGICAL HISTORY   has a past surgical history that includes lumbar fusion posterior; cholecystectomy; gyn surgery; other orthopedic surgery; shoulder arthroscopy; other cardiac surgery; and other cardiac surgery.    CURRENT MEDICATIONS  Home Medications     Reviewed by Denita Reynoso (Pharmacy Tech) on 07/06/18 at 1458  Med List Status: Complete   Medication Last Dose Status   albuterol (PROAIR HFA) 108 (90 Base) MCG/ACT Aero Soln inhalation aerosol PRN Active   ascorbic acid (ASCORBIC ACID) 500 MG Tab 7/6/2018 Active   atorvastatin (LIPITOR) 40 MG Tab 7/5/2018 Active   dabigatran (PRADAXA) 75 MG Cap capsule 7/6/2018 Active   escitalopram (LEXAPRO) 10 MG Tab 7/6/2018 Active   losartan (COZAAR) 50 MG Tab 7/6/2018 Active   memantine (NAMENDA) 5 MG Tab 7/5/2018 Active   omeprazole (PRILOSEC) 20 MG delayed-release capsule 7/6/2018 Active   psyllium (METAMUCIL) 58.12 % Pack 7/6/2018 Active   tamsulosin (FLOMAX) 0.4 MG capsule 7/5/2018 Active   tiotropium (SPIRIVA) 18  "MCG Cap 7/6/2018 Active   vitamin D, Ergocalciferol, (DRISDOL) 80435 units Cap capsule 7/6/2018 Active                ALLERGIES  Allergies   Allergen Reactions   • Beta Adrenergic Blockers Anaphylaxis     HR goes into teens, Cardiology wants him to never have another beta blocker   • Penicillins      Unknown reaction         PHYSICAL EXAM  VITAL SIGNS: /90   Pulse 60   Temp 36.7 °C (98.1 °F)   Resp 18   Ht 1.702 m (5' 7\")   Wt 70.1 kg (154 lb 8.7 oz)   SpO2 97%   BMI 24.20 kg/m²   Constitutional: Alert in no apparent distress. Well apearing  HENT: Normocephalic, Atraumatic, Bilateral external ears normal. Nose normal.   Eyes:  Conjunctiva normal, non-icteric.   Lungs: Non-labored respirations  Skin: Warm, Dry, No erythema, No rash.   Neurologic: Alert, Grossly non-focal.   Psychiatric: Affect normal, Judgment normal, Mood normal, Appears appropriate and not intoxicated.   Extremities: Left elbow with large hematoma around the inferior portion and a superficial abrasion over the hematoma.      DIAGNOSTIC STUDIES / PROCEDURES      COURSE & MEDICAL DECISION MAKING  Pertinent Labs & Imaging studies reviewed. (See chart for details)  This is a 89-year-old gentleman who presents with a hematoma on his left elbow he is on Pradaxa.  Details of fall are unclear.  Head CT was ordered as well to evaluate for possible intracranial bleed.  This was negative.  X-ray was negative for fracture.  I spoke with the patient's son, a GI Dr. in our system and have given him a picture of the injury.  At this time we will hold the Pradaxa until the hematoma resolves and have instructed facility to put ice on the hematoma and use gentle compression dressings.     The patient will return for new or worsening symptoms and is stable at the time of discharge. Patient was given return precautions. Patient and/or family member verbalizes understanding and will comply.    DISPOSITION:  Patient will be discharged home in stable " condition.    FOLLOW UP:  Thelma Cervantes M.D.  [address] [address]  [place] [id number] [phone]    Schedule an appointment as soon as possible for a visit  As needed    [facility], Emergency Dept  [address]  [place] [id number] [phone]    Return for worsening pain and swelling fevers or other concerns.        OUTPATIENT MEDICATIONS:  Discharge Medication List as of 7/6/2018  4:48 PM          Your blood pressure is elevated here in the emergency department. Please monitor your blood pressure over the next several days. If your blood pressure continues to be 120/80 or higher please contact your physician for blood pressure management.       FINAL IMPRESSION  1. Hematoma        2.   3.     This dictation has been creating using voice recognition software. The accuracy of the dictation is limited the abilities of the software.  I expect there may be some errors of grammar and possibly content. I made every attempt to manually correct the errors within my dictation. However errors related to this voice recognition software may still exist and should be interpreted within the appropriate context.        The note accurately reflects work and decisions made by me.  Yanelis Nance  7/6/2018  9:00 PM

## 2018-07-06 NOTE — DISCHARGE INSTRUCTIONS
Discharge orders:   1.  Please D/C the Pradaxa until hematoma resolves  2.  Apply cool compresses for 20 minutes 3 times a day to the elbow  3.  Apply gentle compression dressing to area daily      Hematoma  A hematoma is a collection of blood. The collection of blood can turn into a hard, painful lump under the skin. Your skin may turn blue or yellow if the hematoma is close to the surface of the skin. Most hematomas get better in a few days to weeks. Some hematomas are serious and need medical care. Hematomas can be very small or very big.  Follow these instructions at home:  · Apply ice to the injured area:  ¨ Put ice in a plastic bag.  ¨ Place a towel between your skin and the bag.  ¨ Leave the ice on for 20 minutes, 2-3 times a day for the first 1 to 2 days.  · After the first 2 days, switch to using warm packs on the injured area.  · Raise (elevate) the injured area to lessen pain and puffiness (swelling). You may also wrap the area with an elastic bandage. Make sure the bandage is not wrapped too tight.  · If you have a painful hematoma on your leg or foot, you may use crutches for a couple days.  · Only take medicines as told by your doctor.  Get help right away if:  · Your pain gets worse.  · Your pain is not controlled with medicine.  · You have a fever.  · Your puffiness gets worse.  · Your skin turns more blue or yellow.  · Your skin over the hematoma breaks or starts bleeding.  · Your hematoma is in your chest or belly (abdomen) and you are short of breath, feel weak, or have a change in consciousness.  · Your hematoma is on your scalp and you have a headache that gets worse or a change in alertness or consciousness.  This information is not intended to replace advice given to you by your health care provider. Make sure you discuss any questions you have with your health care provider.  Document Released: 01/25/2006 Document Revised: 05/25/2017 Document Reviewed: 05/28/2014  [employer] Patient  Education © 2017 [employer]

## 2018-07-06 NOTE — ED NOTES
"Chief Complaint   Patient presents with   • T-5000 FALL     slipped and fell in the [place]   • Arm Injury     Left     Temp 36.7 °C (98.1 °F)   Ht 1.702 m (5' 7\")   Wt 70.1 kg (154 lb 8.7 oz)   BMI 24.20 kg/m²     Pt BIB [facility] from [facility] s/p unwitnessed fall in [place] around 1230 today, per report pt was able to get up and go to bed.  Large hematoma to Left Elbow, c/o pain in Left shoulder and arm.  Pt denies hitting head.  "

## 2018-09-04 ENCOUNTER — NON-PROVIDER VISIT (OUTPATIENT)
Dept: CARDIOLOGY | Facility: MEDICAL CENTER | Age: 83
End: 2018-09-04
Payer: MEDICARE

## 2018-09-04 ENCOUNTER — OFFICE VISIT (OUTPATIENT)
Dept: CARDIOLOGY | Facility: MEDICAL CENTER | Age: 83
End: 2018-09-04
Payer: MEDICARE

## 2018-09-04 VITALS
OXYGEN SATURATION: 97 % | BODY MASS INDEX: 24.64 KG/M2 | HEIGHT: 67 IN | DIASTOLIC BLOOD PRESSURE: 85 MMHG | SYSTOLIC BLOOD PRESSURE: 163 MMHG | HEART RATE: 74 BPM | WEIGHT: 157 LBS

## 2018-09-04 DIAGNOSIS — Z51.5 END OF LIFE CARE: ICD-10-CM

## 2018-09-04 DIAGNOSIS — I49.5 TACHYCARDIA-BRADYCARDIA SYNDROME (HCC): ICD-10-CM

## 2018-09-04 DIAGNOSIS — I10 ESSENTIAL HYPERTENSION: ICD-10-CM

## 2018-09-04 DIAGNOSIS — Z95.0 CARDIAC PACEMAKER IN SITU: ICD-10-CM

## 2018-09-04 PROCEDURE — 99214 OFFICE O/P EST MOD 30 MIN: CPT | Performed by: INTERNAL MEDICINE

## 2018-09-04 PROCEDURE — 93279 PRGRMG DEV EVAL PM/LDLS PM: CPT | Performed by: INTERNAL MEDICINE

## 2018-09-04 RX ORDER — DABIGATRAN ETEXILATE 75 MG/1
75 CAPSULE ORAL 2 TIMES DAILY
COMMUNITY
End: 2018-09-10

## 2018-09-04 RX ORDER — GUAIFENESIN 200 MG/10ML
LIQUID ORAL
COMMUNITY
End: 2018-09-10

## 2018-09-05 NOTE — PROGRESS NOTES
Subjective:   Jie Nunez is a 89 y.o. male who presents today in re sss/pm, htn    Here in self-referral in regards to his history of atrial fibrillation on anti-coagulation with a pacemaker placed in 2015 for sick sinus syndrome    In the hospital for stroke, resolved. No change in medications outside of Lipitor  No aspirin, his son is a gastroenterologist    In with his son-in-law - Bill    No setbacks, lives at assisted living/nursing home memory care  Amazing story, from [place] came in he was 10 years old to [place]  No changes    Past Medical History:   Diagnosis Date   • Arrhythmia    • Atrial fibrillation (HCC)    • Cancer (HCC)     prostate   • Congestive heart failure (HCC)    • Hypertension      Past Surgical History:   Procedure Laterality Date   • CHOLECYSTECTOMY     • GYN SURGERY      leyda   • LUMBAR FUSION POSTERIOR     • OTHER CARDIAC SURGERY      A-fib   • OTHER CARDIAC SURGERY      chf   • OTHER ORTHOPEDIC SURGERY      lumbar lami   • SHOULDER ARTHROSCOPY       Family History   Problem Relation Age of Onset   • Heart Disease Neg Hx    • Heart Failure Neg Hx      History   Smoking Status   • Never Smoker   Smokeless Tobacco   • Never Used     Allergies   Allergen Reactions   • Beta Adrenergic Blockers Anaphylaxis     HR goes into teens, Cardiology wants him to never have another beta blocker   • Penicillins      Unknown reaction       Outpatient Encounter Prescriptions as of 9/4/2018   Medication Sig Dispense Refill   • dabigatran (PRADAXA) 75 MG Cap capsule Take 75 mg by mouth 2 Times a Day.     • guaiFENesin (TUSSIN MUCUS & CHEST CONGEST) 100 MG/5ML liquid Take  by mouth.     • tiotropium (SPIRIVA) 18 MCG Cap Inhale 18 mcg by mouth every day.     • tamsulosin (FLOMAX) 0.4 MG capsule Take 0.4 mg by mouth every day.     • albuterol (PROAIR HFA) 108 (90 Base) MCG/ACT Aero Soln inhalation aerosol Inhale 2 Puffs by mouth every 6 hours as needed for Shortness of Breath. 8.5 g 4   •  "atorvastatin (LIPITOR) 40 MG Tab Take 1 Tab by mouth every evening. 90 Tab 12   • losartan (COZAAR) 50 MG Tab Take 1 Tab by mouth every day. 90 Tab 3   • memantine (NAMENDA) 5 MG Tab Take 1 Tab by mouth every evening. 60 Tab 3   • psyllium (METAMUCIL) 58.12 % Pack Take 1 Packet by mouth every day.     • vitamin D, Ergocalciferol, (DRISDOL) 80984 units Cap capsule Take 50,000 Units by mouth every Friday.     • escitalopram (LEXAPRO) 10 MG Tab Take 1 Tab by mouth every day. 30 Tab 3   • omeprazole (PRILOSEC) 20 MG delayed-release capsule Take 1 Cap by mouth every day. 30 Cap 11   • ascorbic acid (ASCORBIC ACID) 500 MG Tab Take 500 mg by mouth every day.       No facility-administered encounter medications on file as of 9/4/2018.      Review of Systems   Unable to perform ROS: Dementia   All other systems reviewed and are negative.       Objective:   BP (!) 163/85   Pulse 74   Ht 1.702 m (5' 7\")   Wt 71.2 kg (157 lb)   SpO2 97%   BMI 24.59 kg/m²     Physical Exam   Constitutional: He is oriented to person, place, and time. He appears well-nourished.   Thin, responds to commands and answers questions appropriately, no acute distress, mild right-sided pill-rolling tremor of the hand - examined again today -- changes noted   HENT:   Head: Normocephalic and atraumatic.   Eyes: Pupils are equal, round, and reactive to light. EOM are normal. No scleral icterus.   Neck: Neck supple. No JVD present. No thyromegaly present.   Cardiovascular: Normal rate and intact distal pulses.  Exam reveals no gallop and no friction rub.    No murmur heard.  Pulmonary/Chest: No respiratory distress. He exhibits no tenderness.   Abdominal: Soft. Bowel sounds are normal. He exhibits no distension.   Musculoskeletal: He exhibits no edema (chronic stasis changes) or tenderness.   Lymphadenopathy:     He has no cervical adenopathy.   Neurological: He is alert and oriented to person, place, and time. He exhibits normal muscle tone. " Coordination normal.   Skin: Skin is warm and dry. No rash noted.       Assessment:     1. Tachycardia-bradycardia syndrome (HCC)     2. Essential hypertension     3. End of life care         Medical Decision Making:  Today's Assessment / Status / Plan:     Atrial fibrillation  Recent echo reviewed normal heart functiona few valve disease  Rate control  Anticoagulation with a close eye on his GFR as well as his CBC. Up-to-date on blood work from his last hospital stay last month    Reassurance given end-of-life care broached  Advance directives reviewed and discussed and filed   Comfort and palliation    htn  Discussed  Optimize and and palliative    Pacemaker check  Reviewed no changes made device is appropriate and functioning normally    Questions answered RTC 6 months sooner with concerns    Physical Exam   Constitutional: He is oriented to person, place, and time. He appears well-nourished.   Thin, responds to commands and answers questions appropriately, no acute distress, mild right-sided pill-rolling tremor of the hand - examined again today -- changes noted   HENT:   Head: Normocephalic and atraumatic.   Eyes: Pupils are equal, round, and reactive to light. EOM are normal. No scleral icterus.   Neck: Neck supple. No JVD present. No thyromegaly present.   Cardiovascular: Normal rate and intact distal pulses.  Exam reveals no gallop and no friction rub.    No murmur heard.  Pulmonary/Chest: No respiratory distress. He exhibits no tenderness.   Abdominal: Soft. Bowel sounds are normal. He exhibits no distension.   Musculoskeletal: He exhibits no edema (chronic stasis changes) or tenderness.   Lymphadenopathy:     He has no cervical adenopathy.   Neurological: He is alert and oriented to person, place, and time. He exhibits normal muscle tone. Coordination normal.   Skin: Skin is warm and dry. No rash noted.

## 2018-09-10 ENCOUNTER — APPOINTMENT (OUTPATIENT)
Dept: RADIOLOGY | Facility: MEDICAL CENTER | Age: 83
End: 2018-09-10
Attending: EMERGENCY MEDICINE
Payer: MEDICARE

## 2018-09-10 ENCOUNTER — HOSPITAL ENCOUNTER (OUTPATIENT)
Facility: MEDICAL CENTER | Age: 83
End: 2018-09-13
Attending: EMERGENCY MEDICINE | Admitting: INTERNAL MEDICINE
Payer: MEDICARE

## 2018-09-10 DIAGNOSIS — F03.90 DEMENTIA WITHOUT BEHAVIORAL DISTURBANCE, UNSPECIFIED DEMENTIA TYPE: ICD-10-CM

## 2018-09-10 DIAGNOSIS — R26.2 INABILITY TO WALK: ICD-10-CM
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
## 2018-09-10 LAB
25(OH)D3 SERPL-MCNC: 149 NG/ML (ref 30–100)
ANION GAP SERPL CALC-SCNC: 7 MMOL/L (ref 0–11.9)
BUN SERPL-MCNC: 22 MG/DL (ref 8–22)
CALCIUM SERPL-MCNC: 8.6 MG/DL (ref 8.4–10.2)
CHLORIDE SERPL-SCNC: 105 MMOL/L (ref 96–112)
CO2 SERPL-SCNC: 24 MMOL/L (ref 20–33)
CREAT SERPL-MCNC: 1.47 MG/DL (ref 0.5–1.4)
ERYTHROCYTE [DISTWIDTH] IN BLOOD BY AUTOMATED COUNT: 49.1 FL (ref 35.9–50)
GLUCOSE SERPL-MCNC: 156 MG/DL (ref 65–99)
HCT VFR BLD AUTO: 40.3 % (ref 42–52)
HGB BLD-MCNC: 13.3 G/DL (ref 14–18)
IRON SATN MFR SERPL: 19 % (ref 15–55)
IRON SERPL-MCNC: 64 UG/DL (ref 50–180)
MCH RBC QN AUTO: 32.6 PG (ref 27–33)
MCHC RBC AUTO-ENTMCNC: 33 G/DL (ref 33.7–35.3)
MCV RBC AUTO: 98.8 FL (ref 81.4–97.8)
PLATELET # BLD AUTO: 141 K/UL (ref 164–446)
PMV BLD AUTO: 10.9 FL (ref 9–12.9)
POTASSIUM SERPL-SCNC: 3.9 MMOL/L (ref 3.6–5.5)
RBC # BLD AUTO: 4.08 M/UL (ref 4.7–6.1)
SODIUM SERPL-SCNC: 136 MMOL/L (ref 135–145)
TIBC SERPL-MCNC: 333 UG/DL (ref 250–450)
WBC # BLD AUTO: 11.6 K/UL (ref 4.8–10.8)

## 2018-09-10 PROCEDURE — 82306 VITAMIN D 25 HYDROXY: CPT

## 2018-09-10 PROCEDURE — 70450 CT HEAD/BRAIN W/O DYE: CPT

## 2018-09-10 PROCEDURE — 83540 ASSAY OF IRON: CPT

## 2018-09-10 PROCEDURE — 85027 COMPLETE CBC AUTOMATED: CPT

## 2018-09-10 PROCEDURE — 36415 COLL VENOUS BLD VENIPUNCTURE: CPT

## 2018-09-10 PROCEDURE — 83550 IRON BINDING TEST: CPT

## 2018-09-10 PROCEDURE — 99285 EMERGENCY DEPT VISIT HI MDM: CPT

## 2018-09-10 PROCEDURE — 80048 BASIC METABOLIC PNL TOTAL CA: CPT

## 2018-09-10 PROCEDURE — 73700 CT LOWER EXTREMITY W/O DYE: CPT | Mod: RT

## 2018-09-10 PROCEDURE — 73552 X-RAY EXAM OF FEMUR 2/>: CPT | Mod: RT

## 2018-09-10 PROCEDURE — 700102 HCHG RX REV CODE 250 W/ 637 OVERRIDE(OP): Performed by: INTERNAL MEDICINE

## 2018-09-10 PROCEDURE — 700105 HCHG RX REV CODE 258: Performed by: INTERNAL MEDICINE

## 2018-09-10 PROCEDURE — 99219 PR INITIAL OBSERVATION CARE,LEVL II: CPT | Performed by: INTERNAL MEDICINE

## 2018-09-10 PROCEDURE — G0378 HOSPITAL OBSERVATION PER HR: HCPCS

## 2018-09-10 PROCEDURE — 73501 X-RAY EXAM HIP UNI 1 VIEW: CPT | Mod: RT

## 2018-09-10 PROCEDURE — A9270 NON-COVERED ITEM OR SERVICE: HCPCS | Performed by: INTERNAL MEDICINE

## 2018-09-10 RX ORDER — OMEPRAZOLE 20 MG/1
20 CAPSULE, DELAYED RELEASE ORAL DAILY
COMMUNITY

## 2018-09-10 RX ORDER — POLYETHYLENE GLYCOL 3350 17 G/17G
1 POWDER, FOR SOLUTION ORAL
Status: DISCONTINUED | OUTPATIENT
Start: 2018-09-10 | End: 2018-09-13 | Stop reason: HOSPADM

## 2018-09-10 RX ORDER — TIOTROPIUM BROMIDE 18 UG/1
1 CAPSULE ORAL; RESPIRATORY (INHALATION) DAILY
Status: DISCONTINUED | OUTPATIENT
Start: 2018-09-10 | End: 2018-09-13 | Stop reason: HOSPADM

## 2018-09-10 RX ORDER — DABIGATRAN ETEXILATE 75 MG/1
75 CAPSULE ORAL 2 TIMES DAILY
Status: ON HOLD | COMMUNITY
End: 2018-09-13

## 2018-09-10 RX ORDER — AMLODIPINE BESYLATE 5 MG/1
5 TABLET ORAL DAILY
COMMUNITY
End: 2019-01-26 | Stop reason: CLARIF

## 2018-09-10 RX ORDER — DEXTROMETHORPHAN HBR. AND GUAIFENESIN 10; 100 MG/5ML; MG/5ML
SOLUTION ORAL EVERY 4 HOURS PRN
Status: SHIPPED | COMMUNITY
End: 2018-09-10

## 2018-09-10 RX ORDER — ENALAPRILAT 1.25 MG/ML
1.25 INJECTION INTRAVENOUS EVERY 6 HOURS PRN
Status: DISCONTINUED | OUTPATIENT
Start: 2018-09-10 | End: 2018-09-13 | Stop reason: HOSPADM

## 2018-09-10 RX ORDER — TAMSULOSIN HYDROCHLORIDE 0.4 MG/1
0.4 CAPSULE ORAL EVERY EVENING
COMMUNITY
End: 2019-07-31

## 2018-09-10 RX ORDER — LOSARTAN POTASSIUM 50 MG/1
50 TABLET ORAL EVERY EVENING
Status: ON HOLD | COMMUNITY
End: 2018-09-13

## 2018-09-10 RX ORDER — OMEPRAZOLE 20 MG/1
20 CAPSULE, DELAYED RELEASE ORAL DAILY
Status: DISCONTINUED | OUTPATIENT
Start: 2018-09-11 | End: 2018-09-13 | Stop reason: HOSPADM

## 2018-09-10 RX ORDER — AMOXICILLIN 250 MG
2 CAPSULE ORAL 2 TIMES DAILY
Status: DISCONTINUED | OUTPATIENT
Start: 2018-09-10 | End: 2018-09-13 | Stop reason: HOSPADM

## 2018-09-10 RX ORDER — TAMSULOSIN HYDROCHLORIDE 0.4 MG/1
0.4 CAPSULE ORAL EVERY EVENING
Status: DISCONTINUED | OUTPATIENT
Start: 2018-09-10 | End: 2018-09-13 | Stop reason: HOSPADM

## 2018-09-10 RX ORDER — MEMANTINE HYDROCHLORIDE 5 MG/1
5 TABLET ORAL EVERY EVENING
COMMUNITY

## 2018-09-10 RX ORDER — LOSARTAN POTASSIUM 25 MG/1
25 TABLET ORAL EVERY MORNING
Status: DISCONTINUED | OUTPATIENT
Start: 2018-09-11 | End: 2018-09-10

## 2018-09-10 RX ORDER — LOSARTAN POTASSIUM 25 MG/1
25 TABLET ORAL EVERY MORNING
COMMUNITY

## 2018-09-10 RX ORDER — ATORVASTATIN CALCIUM 40 MG/1
40 TABLET, FILM COATED ORAL NIGHTLY
COMMUNITY
End: 2019-04-11 | Stop reason: SDUPTHER

## 2018-09-10 RX ORDER — TIOTROPIUM BROMIDE 18 UG/1
18 CAPSULE ORAL; RESPIRATORY (INHALATION) DAILY
COMMUNITY

## 2018-09-10 RX ORDER — SODIUM CHLORIDE 9 MG/ML
INJECTION, SOLUTION INTRAVENOUS CONTINUOUS
Status: DISCONTINUED | OUTPATIENT
Start: 2018-09-10 | End: 2018-09-13 | Stop reason: HOSPADM

## 2018-09-10 RX ORDER — MEMANTINE HYDROCHLORIDE 10 MG/1
5 TABLET ORAL DAILY
Status: DISCONTINUED | OUTPATIENT
Start: 2018-09-11 | End: 2018-09-13 | Stop reason: HOSPADM

## 2018-09-10 RX ORDER — ESCITALOPRAM OXALATE 10 MG/1
10 TABLET ORAL DAILY
COMMUNITY

## 2018-09-10 RX ORDER — LOSARTAN POTASSIUM 25 MG/1
50 TABLET ORAL EVERY MORNING
Status: DISCONTINUED | OUTPATIENT
Start: 2018-09-11 | End: 2018-09-13 | Stop reason: HOSPADM

## 2018-09-10 RX ORDER — DABIGATRAN ETEXILATE 75 MG/1
75 CAPSULE ORAL 2 TIMES DAILY
Status: DISCONTINUED | OUTPATIENT
Start: 2018-09-10 | End: 2018-09-11

## 2018-09-10 RX ORDER — ATORVASTATIN CALCIUM 40 MG/1
40 TABLET, FILM COATED ORAL NIGHTLY
Status: DISCONTINUED | OUTPATIENT
Start: 2018-09-10 | End: 2018-09-13 | Stop reason: HOSPADM

## 2018-09-10 RX ORDER — ONDANSETRON 2 MG/ML
4 INJECTION INTRAMUSCULAR; INTRAVENOUS EVERY 4 HOURS PRN
Status: DISCONTINUED | OUTPATIENT
Start: 2018-09-10 | End: 2018-09-13 | Stop reason: HOSPADM

## 2018-09-10 RX ORDER — ACETAMINOPHEN 325 MG/1
650 TABLET ORAL EVERY 6 HOURS PRN
Status: DISCONTINUED | OUTPATIENT
Start: 2018-09-10 | End: 2018-09-13 | Stop reason: HOSPADM

## 2018-09-10 RX ORDER — LOSARTAN POTASSIUM 25 MG/1
50 TABLET ORAL EVERY EVENING
Status: DISCONTINUED | OUTPATIENT
Start: 2018-09-10 | End: 2018-09-10

## 2018-09-10 RX ORDER — ESCITALOPRAM OXALATE 10 MG/1
10 TABLET ORAL DAILY
Status: DISCONTINUED | OUTPATIENT
Start: 2018-09-11 | End: 2018-09-13 | Stop reason: HOSPADM

## 2018-09-10 RX ORDER — ONDANSETRON 4 MG/1
4 TABLET, ORALLY DISINTEGRATING ORAL EVERY 4 HOURS PRN
Status: DISCONTINUED | OUTPATIENT
Start: 2018-09-10 | End: 2018-09-13 | Stop reason: HOSPADM

## 2018-09-10 RX ORDER — BISACODYL 10 MG
10 SUPPOSITORY, RECTAL RECTAL
Status: DISCONTINUED | OUTPATIENT
Start: 2018-09-10 | End: 2018-09-13 | Stop reason: HOSPADM

## 2018-09-10 RX ORDER — AMLODIPINE BESYLATE 5 MG/1
5 TABLET ORAL DAILY
Status: DISCONTINUED | OUTPATIENT
Start: 2018-09-11 | End: 2018-09-13 | Stop reason: HOSPADM

## 2018-09-10 RX ADMIN — SENNOSIDES AND DOCUSATE SODIUM 2 TABLET: 8.6; 5 TABLET ORAL at 17:05

## 2018-09-10 RX ADMIN — TAMSULOSIN HYDROCHLORIDE 0.4 MG: 0.4 CAPSULE ORAL at 17:06

## 2018-09-10 RX ADMIN — PSYLLIUM HUSK 1 PACKET: 3.4 POWDER ORAL at 15:24

## 2018-09-10 RX ADMIN — SODIUM CHLORIDE: 9 INJECTION, SOLUTION INTRAVENOUS at 15:24

## 2018-09-10 RX ADMIN — DABIGATRAN ETEXILATE MESYLATE 75 MG: 75 CAPSULE ORAL at 17:06

## 2018-09-10 ASSESSMENT — CHA2DS2 SCORE
VASCULAR DISEASE: NO
SEX: MALE
DIABETES: NO
AGE 75 OR GREATER: YES
HYPERTENSION: YES
AGE 65 TO 74: NO
CHA2DS2 VASC SCORE: 4
PRIOR STROKE OR TIA OR THROMBOEMBOLISM: NO
CHF OR LEFT VENTRICULAR DYSFUNCTION: YES

## 2018-09-10 ASSESSMENT — LIFESTYLE VARIABLES: EVER_SMOKED: NEVER

## 2018-09-10 ASSESSMENT — PATIENT HEALTH QUESTIONNAIRE - PHQ9
SUM OF ALL RESPONSES TO PHQ9 QUESTIONS 1 AND 2: 0
1. LITTLE INTEREST OR PLEASURE IN DOING THINGS: NOT AT ALL
SUM OF ALL RESPONSES TO PHQ9 QUESTIONS 1 AND 2: 0
2. FEELING DOWN, DEPRESSED, IRRITABLE, OR HOPELESS: NOT AT ALL
2. FEELING DOWN, DEPRESSED, IRRITABLE, OR HOPELESS: NOT AT ALL
1. LITTLE INTEREST OR PLEASURE IN DOING THINGS: NOT AT ALL

## 2018-09-10 ASSESSMENT — COGNITIVE AND FUNCTIONAL STATUS - GENERAL
SUGGESTED CMS G CODE MODIFIER DAILY ACTIVITY: CK
DRESSING REGULAR LOWER BODY CLOTHING: A LOT
CLIMB 3 TO 5 STEPS WITH RAILING: TOTAL
HELP NEEDED FOR BATHING: A LOT
TOILETING: A LOT
PERSONAL GROOMING: A LITTLE
SUGGESTED CMS G CODE MODIFIER MOBILITY: CL
EATING MEALS: A LITTLE
MOVING FROM LYING ON BACK TO SITTING ON SIDE OF FLAT BED: A LOT
STANDING UP FROM CHAIR USING ARMS: A LOT
MOBILITY SCORE: 13
MOVING TO AND FROM BED TO CHAIR: A LOT
WALKING IN HOSPITAL ROOM: A LOT
DAILY ACTIVITIY SCORE: 14
DRESSING REGULAR UPPER BODY CLOTHING: A LOT

## 2018-09-10 ASSESSMENT — PAIN SCALES - GENERAL
PAINLEVEL_OUTOF10: 1
PAINLEVEL_OUTOF10: 0

## 2018-09-10 NOTE — ED NOTES
Admit orders noted- per previous rn, labs sent to lab, priro to orders. Call to lab regarding same. willl notify staff if unable to process

## 2018-09-10 NOTE — ED NOTES
Pt rounding-aware of pending admit. denies c/o juice provided per request. Vs as charted, denies further needs

## 2018-09-10 NOTE — ED PROVIDER NOTES
ED Provider Note    CHIEF COMPLAINT  Chief Complaint   Patient presents with   • T-5000 FALL     fell at care facility around 0230 this morning   • Hip Injury     Right   • Leg Pain     Right   • Elbow Pain     Right, abrasion to elbow       HPI  Jie Nunez is a 89 y.o. male who presents for evaluation after ground-level fall.  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  He is here with his father today.  He received a phone call at approximately 245 this morning and was told that his father to had a fall while walking to the bathroom.  There is no reported loss of consciousness.  He has had a memory care facility and does not really recall what happened.  He states he could not get up because of feeling too weak.  He has no complaints at this time.    REVIEW OF SYSTEMS  See HPI for further details. All other systems negative.    PAST MEDICAL HISTORY  Past Medical History:   Diagnosis Date   • Arrhythmia    • Atrial fibrillation (HCC)    • Cancer (HCC)     prostate   • Congestive heart failure (HCC)    • Hypertension        FAMILY HISTORY  Family History   Problem Relation Age of Onset   • Heart Disease Neg Hx    • Heart Failure Neg Hx        SOCIAL HISTORY  Social History     Social History   • Marital status: Single     Spouse name: N/A   • Number of children: N/A   • Years of education: N/A     Social History Main Topics   • Smoking status: Never Smoker   • Smokeless tobacco: Never Used   • Alcohol use No   • Drug use: No   • Sexual activity: Not on file     Other Topics Concern   • Not on file     Social History Narrative    ** Merged History Encounter **            SURGICAL HISTORY  Past Surgical History:   Procedure Laterality Date   • CHOLECYSTECTOMY     • GYN SURGERY      leyda   • LUMBAR FUSION POSTERIOR     • OTHER CARDIAC SURGERY      A-fib   • OTHER CARDIAC SURGERY      chf   • OTHER ORTHOPEDIC SURGERY      lumbar lami   • SHOULDER ARTHROSCOPY         CURRENT MEDICATIONS  Home  "Medications    **Home medications have not yet been reviewed for this encounter**         ALLERGIES  Allergies   Allergen Reactions   • Beta Adrenergic Blockers Anaphylaxis     HR goes into teens, Cardiology wants him to never have another beta blocker   • Penicillins      Unknown reaction         PHYSICAL EXAM  VITAL SIGNS: /83   Pulse 62   Temp 36.1 °C (97 °F)   Resp 18   Ht 1.702 m (5' 7\")   Wt 71.2 kg (157 lb 1.2 oz)   SpO2 95%   BMI 24.60 kg/m²   Constitutional: Well developed, Well nourished, No acute distress, Non-toxic appearance.   HENT: Normocephalic, Atraumatic.  Eyes: PERRL, EOMI, Conjunctiva normal, No discharge.   Neck: Normal range of motion, No tenderness.   Cardiovascular: Normal heart rate, Normal rhythm, No murmurs, No rubs, No gallops.   Thorax & Lungs: Clear to auscultation without wheezes, rales, or rhonchi. No chest tenderness.   Abdomen:  Soft, No tenderness, No masses, No pulsatile masses.   Skin: Warm, Dry.  Musculoskeletal: Right upper extremity shows full range of motion.  He has some minor abrasion to the right elbow.  Right lower extremity appears to be a little foreshortened.  There is no obvious deformity.  He really does not have much apparent pain with range of motion of the hip but he seems but somewhat restricted in letting me move it.  Left lower extremity shows full range of motion.  Neurologic: Awake and alert with no focal deficits noted.      RADIOLOGY/PROCEDURES  CT-HIP W/O PLUS RECONS RIGHT   Final Result      1.  No evidence of right hip fracture. Subtle, nondisplaced impacted fractures are difficult to entirely exclude.      DX-FEMUR-2+ RIGHT   Final Result      1.  No acute fracture is identified.      2.  Atherosclerosis.      3.  Osteopenia      CT-HEAD W/O   Final Result      1.  Cerebral atrophy.      2.  White matter lucencies most consistent with small vessel ischemic change versus demyelination or gliosis.      3.  Otherwise, Head CT without contrast " with no acute findings. No evidence of acute cerebral infarction, hemorrhage or mass lesion.      DX-HIP-UNILATERAL-WITH PELVIS-1 VIEW RIGHT   Final Result      1.  No acute fracture is identified.      2.  Osteopenia            COURSE & MEDICAL DECISION MAKING  Pertinent Labs & Imaging studies reviewed. (See chart for details)  This is a 89-year-old who is here for evaluation after a ground-level fall.  He is the father of Dr. Nunez of .  Initial evaluation consisted of a CT scan of the head which demonstrates atrophy but no evidence of acute abnormalities.  X-ray of the right hip shows no obvious bony abnormalities.  At that point the patient was gotten up in an attempt to ambulate and he was really not able to ambulate at all therefore was placed back in bed.  At that point a CT scan of the hip is obtained which shows no evidence of fracture.  Right femur films also show no evidence of fracture.  At this point the patient will require hospitalization.  I discussed the case with Dr. Kay of the hospitalist service and he will be the primary admitting physician.    FINAL IMPRESSION  1.  Ground-level fall  2.  Inability to ambulate  3.  Dementia         Electronically signed by: Bill Vasquez, 9/10/2018 10:20 AM

## 2018-09-10 NOTE — ED NOTES
recvd via report. Per same, pt unable to weight bare due to pain to leg. erp aware, hospitalist at bedside   soft

## 2018-09-10 NOTE — ED NOTES
Pt and family member aware waiting for further Radiology studies.    Pt and family member provided w/ warm blankets.

## 2018-09-10 NOTE — H&P
Hospital Medicine History & Physical Note    Date of Service  9/10/2018    Primary Care Physician  Thelma Cervantes M.D.    Consultants  None    Code Status  DNR    Chief Complaint  Fall    History of Presenting Illness  89 y.o. male who presented 9/10/2018 with fall.  Patient does live in a memory care facility, had a fall today.  His son who is a GI doctor was called, he arrived there and tried to take him for lunch.  Patient was unable to walk so he decided to bring him to the emergency department.  Patient did have imaging, no fractures noted.  Patient did have a right elbow abrasion, now has bandage on this.  Patient recently fell and did have a large laceration on his left elbow which has healed.    Review of Systems  Review of Systems   Unable to perform ROS: Dementia       Past Medical History   has a past medical history of Arrhythmia; Atrial fibrillation (HCC); Cancer (HCC); Congestive heart failure (HCC); and Hypertension.    Surgical History   has a past surgical history that includes lumbar fusion posterior; cholecystectomy; gyn surgery; other orthopedic surgery; shoulder arthroscopy; other cardiac surgery; and other cardiac surgery.     Family History  Reviewed, nonpertinent    Social History   reports that he has never smoked. He has never used smokeless tobacco. He reports that he does not drink alcohol or use drugs.    Allergies  Allergies   Allergen Reactions   • Beta Adrenergic Blockers Anaphylaxis     HR goes into teens, Cardiology wants him to never have another beta blocker   • Penicillins      Unknown reaction         Medications  Prior to Admission Medications   Prescriptions Last Dose Informant Patient Reported? Taking?   amLODIPine (NORVASC) 5 MG Tab 9/10/2018 at 0800 Family Member Yes Yes   Sig: Take 5 mg by mouth every day.   atorvastatin (LIPITOR) 40 MG Tab 9/9/2018 at 1800 Family Member Yes Yes   Sig: Take 40 mg by mouth every evening.   dabigatran (PRADAXA) 75 MG Cap capsule 9/9/2018 at  1800 Family Member Yes Yes   Sig: Take 75 mg by mouth 2 Times a Day.   escitalopram (LEXAPRO) 10 MG Tab 9/10/2018 at 0800 Family Member Yes Yes   Sig: Take 10 mg by mouth every day.   losartan (COZAAR) 25 MG Tab 9/10/2018 at 0800 Family Member Yes Yes   Sig: Take 25 mg by mouth every morning.   losartan (COZAAR) 50 MG Tab 9/9/2018 at 1800 Family Member Yes Yes   Sig: Take 50 mg by mouth every evening.   memantine (NAMENDA) 5 MG Tab 9/10/2018 at 0800 Family Member Yes Yes   Sig: Take 5 mg by mouth every day.   omeprazole (PRILOSEC) 20 MG delayed-release capsule 9/10/2018 at 0800 Family Member Yes Yes   Sig: Take 20 mg by mouth every day.   psyllium (METAMUCIL) 58.12 % Pack 9/9/2018 at 1200 Family Member Yes Yes   Sig: Take 1 Packet by mouth every day.   tamsulosin (FLOMAX) 0.4 MG capsule 9/9/2018 at 1800 Family Member Yes Yes   Sig: Take 0.4 mg by mouth every evening.   tiotropium (SPIRIVA) 18 MCG Cap 9/9/2018 at 0800 Family Member Yes Yes   Sig: Inhale 18 mcg by mouth every day.   vitamin D (CHOLECALCIFEROL) 1000 UNIT Tab 9/10/2018 at 0800 Family Member Yes Yes   Sig: Take 1,000 Units by mouth every day.      Facility-Administered Medications: None       Physical Exam  Blood Pressure : 151/83   Temperature: 36.1 °C (97 °F)   Pulse: 61   Respiration: 18   Pulse Oximetry: 91 %     Physical Exam   Constitutional:  Non-toxic appearance. No distress.   Thin and frail appearing    HENT:   Head: Normocephalic and atraumatic.   Mouth/Throat: Mucous membranes are dry. No oropharyngeal exudate.   Eyes: Right eye exhibits no discharge. Left eye exhibits no discharge.   Neck: Neck supple. No tracheal deviation, no edema and no erythema present.   Cardiovascular: Normal rate and regular rhythm.  Exam reveals no gallop and no friction rub.    Murmur heard.  Pulmonary/Chest: Effort normal and breath sounds normal. No stridor. No respiratory distress. He has no wheezes. He has no rales. He exhibits no tenderness.   Abdominal: Soft.  Bowel sounds are normal. He exhibits no distension. There is no tenderness.   Musculoskeletal: He exhibits no edema or tenderness.   Lymphadenopathy:     He has no cervical adenopathy.   Neurological: He is alert. No cranial nerve deficit.   Confused and forgetful    Skin: Skin is warm and dry. No rash noted. He is not diaphoretic. No erythema.   Psychiatric: He has a normal mood and affect. He is slowed. Cognition and memory are impaired.   Nursing note and vitals reviewed.      Laboratory:          No results for input(s): ALTSGPT, ASTSGOT, ALKPHOSPHAT, TBILIRUBIN, DBILIRUBIN, GAMMAGT, AMYLASE, LIPASE, ALB, PREALBUMIN, GLUCOSE in the last 72 hours.              Lab Results   Component Value Date    TROPONINI 0.02 02/09/2018       Urinalysis:    No results found     Imaging:  CT-HIP W/O PLUS RECONS RIGHT   Final Result      1.  No evidence of right hip fracture. Subtle, nondisplaced impacted fractures are difficult to entirely exclude.      DX-FEMUR-2+ RIGHT   Final Result      1.  No acute fracture is identified.      2.  Atherosclerosis.      3.  Osteopenia      CT-HEAD W/O   Final Result      1.  Cerebral atrophy.      2.  White matter lucencies most consistent with small vessel ischemic change versus demyelination or gliosis.      3.  Otherwise, Head CT without contrast with no acute findings. No evidence of acute cerebral infarction, hemorrhage or mass lesion.      DX-HIP-UNILATERAL-WITH PELVIS-1 VIEW RIGHT   Final Result      1.  No acute fracture is identified.      2.  Osteopenia            Assessment/Plan:  I anticipate this patient is appropriate for observation status at this time.    Falls   Assessment & Plan    -Patient does not remember it however he did remember earlier when he was talking to his son, apparently he just lost his balance whenever he was walking on carpet  -No fracture noted  -Patient denies pain at this time        Dementia- (present on admission)   Assessment & Plan    -At baseline         Chronic kidney disease, stage III (moderate)- (present on admission)   Assessment & Plan    -At baseline  -Good urinary output  -Initially labs were not available, easy IV fluids were initiated  -Son states that patient has not been drinking as much as normal, continue these overnight, can likely be stopped soon if patient is tolerating p.o.        HTN (hypertension)- (present on admission)   Assessment & Plan    -Continue losartan, amlodipine  -Place PRN medication and adjust as needed        Paroxysmal atrial fibrillation (CMS-HCC)- (present on admission)   Assessment & Plan    -Sinus, not on rate control or antiarrhythmics  -Patient is on Pradaxa, will continue at this time but considering his falls, unsure if he is the best candidate for anticoagulation            VTE prophylaxis: Pradaxa

## 2018-09-10 NOTE — ED NOTES
"Chief Complaint   Patient presents with   • T-5000 FALL     fell at care facility around 0230 this morning   • Hip Injury     Right   • Leg Pain     Right   • Elbow Pain     Right, abrasion to elbow     /83   Pulse 62   Temp 36.1 °C (97 °F)   Resp 18   Ht 1.702 m (5' 7\")   SpO2 95%     "

## 2018-09-10 NOTE — CARE PLAN
Problem: Safety  Goal: Will remain free from injury  Outcome: PROGRESSING AS EXPECTED  Pt instructed to call for assistance, bed alarm in place

## 2018-09-10 NOTE — ED NOTES
Attempted to ambulate pt using walker and assist from ED Tech.  Pt was only able to take two small steps before 2 person assist back to bed.  Per son pt is able to ambulate around care facility using walker without assistance from staff.    ERP aware pt unable to ambulate.

## 2018-09-10 NOTE — CARE PLAN
Problem: Knowledge Deficit  Goal: Knowledge of the prescribed therapeutic regimen will improve  Outcome: PROGRESSING AS EXPECTED  Discussed plan of care with patient and son

## 2018-09-10 NOTE — ED NOTES
Med Rec complete per Pt's family at bedside with RX list from care facility (returned)  Allergies Reviewed  No ABX in the last 30 days  Ok per Pt to discuss medications with visitor/s present    Pt takes LOSARTAN 25 mg every morning and LOSARTAN 50 mg every night

## 2018-09-11 LAB
ANION GAP SERPL CALC-SCNC: 7 MMOL/L (ref 0–11.9)
BUN SERPL-MCNC: 16 MG/DL (ref 8–22)
CALCIUM SERPL-MCNC: 8.2 MG/DL (ref 8.4–10.2)
CHLORIDE SERPL-SCNC: 104 MMOL/L (ref 96–112)
CO2 SERPL-SCNC: 23 MMOL/L (ref 20–33)
CREAT SERPL-MCNC: 1.33 MG/DL (ref 0.5–1.4)
ERYTHROCYTE [DISTWIDTH] IN BLOOD BY AUTOMATED COUNT: 48.6 FL (ref 35.9–50)
GLUCOSE SERPL-MCNC: 106 MG/DL (ref 65–99)
HCT VFR BLD AUTO: 36.2 % (ref 42–52)
HGB BLD-MCNC: 12.2 G/DL (ref 14–18)
MCH RBC QN AUTO: 32.2 PG (ref 27–33)
MCHC RBC AUTO-ENTMCNC: 33.7 G/DL (ref 33.7–35.3)
MCV RBC AUTO: 95.5 FL (ref 81.4–97.8)
PLATELET # BLD AUTO: 113 K/UL (ref 164–446)
PMV BLD AUTO: 10.8 FL (ref 9–12.9)
POTASSIUM SERPL-SCNC: 3.7 MMOL/L (ref 3.6–5.5)
RBC # BLD AUTO: 3.79 M/UL (ref 4.7–6.1)
SODIUM SERPL-SCNC: 134 MMOL/L (ref 135–145)
WBC # BLD AUTO: 9 K/UL (ref 4.8–10.8)

## 2018-09-11 PROCEDURE — A9270 NON-COVERED ITEM OR SERVICE: HCPCS | Performed by: INTERNAL MEDICINE

## 2018-09-11 PROCEDURE — 97167 OT EVAL HIGH COMPLEX 60 MIN: CPT

## 2018-09-11 PROCEDURE — 36415 COLL VENOUS BLD VENIPUNCTURE: CPT

## 2018-09-11 PROCEDURE — 97162 PT EVAL MOD COMPLEX 30 MIN: CPT

## 2018-09-11 PROCEDURE — 80048 BASIC METABOLIC PNL TOTAL CA: CPT

## 2018-09-11 PROCEDURE — G8978 MOBILITY CURRENT STATUS: HCPCS | Mod: CK

## 2018-09-11 PROCEDURE — G8988 SELF CARE GOAL STATUS: HCPCS | Mod: CJ

## 2018-09-11 PROCEDURE — G8979 MOBILITY GOAL STATUS: HCPCS | Mod: CJ

## 2018-09-11 PROCEDURE — 85027 COMPLETE CBC AUTOMATED: CPT

## 2018-09-11 PROCEDURE — G0378 HOSPITAL OBSERVATION PER HR: HCPCS

## 2018-09-11 PROCEDURE — 700102 HCHG RX REV CODE 250 W/ 637 OVERRIDE(OP): Performed by: INTERNAL MEDICINE

## 2018-09-11 PROCEDURE — G8987 SELF CARE CURRENT STATUS: HCPCS | Mod: CK

## 2018-09-11 PROCEDURE — 99225 PR SUBSEQUENT OBSERVATION CARE,LEVEL II: CPT | Performed by: HOSPITALIST

## 2018-09-11 RX ADMIN — TIOTROPIUM BROMIDE 1 CAPSULE: 18 CAPSULE ORAL; RESPIRATORY (INHALATION) at 08:02

## 2018-09-11 RX ADMIN — DABIGATRAN ETEXILATE MESYLATE 75 MG: 75 CAPSULE ORAL at 08:02

## 2018-09-11 RX ADMIN — SENNOSIDES AND DOCUSATE SODIUM 2 TABLET: 8.6; 5 TABLET ORAL at 17:06

## 2018-09-11 RX ADMIN — MEMANTINE HYDROCHLORIDE 5 MG: 10 TABLET ORAL at 08:00

## 2018-09-11 RX ADMIN — OMEPRAZOLE 20 MG: 20 CAPSULE, DELAYED RELEASE ORAL at 08:01

## 2018-09-11 RX ADMIN — AMLODIPINE BESYLATE 5 MG: 5 TABLET ORAL at 08:00

## 2018-09-11 RX ADMIN — PSYLLIUM HUSK 1 PACKET: 3.4 POWDER ORAL at 08:01

## 2018-09-11 RX ADMIN — ESCITALOPRAM OXALATE 10 MG: 10 TABLET ORAL at 08:00

## 2018-09-11 RX ADMIN — VITAMIN D, TAB 1000IU (100/BT) 1000 UNITS: 25 TAB at 08:01

## 2018-09-11 RX ADMIN — ATORVASTATIN CALCIUM 40 MG: 40 TABLET, FILM COATED ORAL at 20:07

## 2018-09-11 RX ADMIN — SENNOSIDES AND DOCUSATE SODIUM 2 TABLET: 8.6; 5 TABLET ORAL at 08:01

## 2018-09-11 RX ADMIN — LOSARTAN POTASSIUM 50 MG: 25 TABLET, FILM COATED ORAL at 08:00

## 2018-09-11 RX ADMIN — TAMSULOSIN HYDROCHLORIDE 0.4 MG: 0.4 CAPSULE ORAL at 17:06

## 2018-09-11 ASSESSMENT — PAIN SCALES - GENERAL
PAINLEVEL_OUTOF10: 4
PAINLEVEL_OUTOF10: 0

## 2018-09-11 ASSESSMENT — ACTIVITIES OF DAILY LIVING (ADL): TOILETING: REQUIRES ASSIST

## 2018-09-11 ASSESSMENT — COGNITIVE AND FUNCTIONAL STATUS - GENERAL
DRESSING REGULAR UPPER BODY CLOTHING: A LITTLE
DRESSING REGULAR LOWER BODY CLOTHING: A LOT
TOILETING: A LOT
PERSONAL GROOMING: A LITTLE
DAILY ACTIVITIY SCORE: 15
SUGGESTED CMS G CODE MODIFIER DAILY ACTIVITY: CK
EATING MEALS: A LITTLE
HELP NEEDED FOR BATHING: A LOT

## 2018-09-11 ASSESSMENT — GAIT ASSESSMENTS
ASSISTIVE DEVICE: FRONT WHEEL WALKER
DEVIATION: BRADYKINETIC
DISTANCE (FEET): 100
GAIT LEVEL OF ASSIST: CONTACT GUARD ASSIST

## 2018-09-11 NOTE — THERAPY
"Physical Therapy Evaluation completed.   Bed Mobility:  Supine to Sit: Minimal Assist  Transfers: Sit to Stand: Contact Guard Assist  Gait: Level Of Assist: Contact Guard Assist with Front-Wheel Walker  X 100 feet     Plan of Care: Will benefit from Physical Therapy 5 times per week  Discharge Recommendations: Equipment: No Equipment Needed. Post-acute therapy Discharge to a transitional care facility for continued skilled therapy services.  89 year old male fell at memory home Difficult to obtain Pts prior level of function as is a poor historian Pt would not be able to function Indepentently at this time and is a significant fall risk  See \"Rehab Therapy-Acute\" Patient Summary Report for complete documentation.     "

## 2018-09-11 NOTE — PROGRESS NOTES
Patient linens and gown changed, skin cleansed. Morning medications given per MAR. Assessment completed, right leg is still sore.

## 2018-09-11 NOTE — ASSESSMENT & PLAN NOTE
-Sinus, not on rate control or antiarrhythmics  -Patient is on Pradaxa, 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

## 2018-09-11 NOTE — CARE PLAN
Problem: Infection  Goal: Will remain free from infection  Outcome: PROGRESSING AS EXPECTED  Patient shows no signs or symptoms of infection

## 2018-09-11 NOTE — THERAPY
"Occupational Therapy Evaluation completed.   Functional Status: Pt is a 89 y/o male, admit after a fall with pain and difficulty AMB. Pt lives in a Memory care facility , unable to determine amount of care provided . PLOF- unclear, as Pt is a poor historian. Pt presents with generalized weakness, impaired functional cognition, decreased I with ADLS and functional mobility. Pt requires Min A for UB self care, Mod A for LB, SBA to CGA for functional transfers. Pt will benefit from Acute OT services to increase functional I prior to d/c.  Plan of Care: Will benefit from Occupational Therapy 3 times per week  Discharge Recommendations:  Equipment: Will Continue to Assess for Equipment Needs. Post-acute therapy Discharge to a transitional care facility for continued skilled therapy services.    See \"Rehab Therapy-Acute\" Patient Summary Report for complete documentation.    "

## 2018-09-11 NOTE — PROGRESS NOTES
[facility] Hospitalist Progress Note    Date of Service: 2018    Chief Complaint  89 y.o. male admitted 9/10/2018 with fall, patient lives in memory care    Interval Problem Update  Patient with dementia, unable to provide much information, no family at bedside    Consultants/Specialty  none    Disposition  tbd  Pt/OT        Review of Systems   Unable to perform ROS: Dementia      Physical Exam  Laboratory/Imaging   Hemodynamics  Temp (24hrs), Av.8 °C (98.3 °F), Min:36.7 °C (98 °F), Max:37.1 °C (98.7 °F)   Temperature: 37.1 °C (98.7 °F)  Pulse  Av.4  Min: 59  Max: 69    Blood Pressure : 153/67      Respiratory      Respiration: 18, Pulse Oximetry: 93 %             Fluids    Intake/Output Summary (Last 24 hours) at 18 1459  Last data filed at 18 0800   Gross per 24 hour   Intake              440 ml   Output              250 ml   Net              190 ml       Nutrition  Orders Placed This Encounter   Procedures   • Diet Order Regular     Standing Status:   Standing     Number of Occurrences:   1     Order Specific Question:   Diet:     Answer:   Regular [1]     Physical Exam   Constitutional: He appears well-developed and well-nourished. No distress.   HENT:   Head: Normocephalic and atraumatic.   Mouth/Throat: Oropharynx is clear and moist. No oropharyngeal exudate.   Eyes: Pupils are equal, round, and reactive to light. Conjunctivae are normal. No scleral icterus.   Neck: Normal range of motion. Neck supple. No JVD present.   Cardiovascular: Normal rate, regular rhythm and intact distal pulses.    No murmur heard.  Pulmonary/Chest: Effort normal and breath sounds normal. No respiratory distress. He has no wheezes.   Abdominal: Soft. Bowel sounds are normal. He exhibits no distension. There is no tenderness.   Musculoskeletal: Normal range of motion. He exhibits no edema or tenderness.   Lymphadenopathy:     He has no cervical adenopathy.   Neurological: He is alert. He exhibits normal muscle  tone.   Skin: Skin is warm and dry. No rash noted. No erythema.   Psychiatric: He has a normal mood and affect. His behavior is normal.       Recent Labs      09/10/18   1000  09/11/18   0508   WBC  11.6*  9.0   RBC  4.08*  3.79*   HEMOGLOBIN  13.3*  12.2*   HEMATOCRIT  40.3*  36.2*   MCV  98.8*  95.5   MCH  32.6  32.2   MCHC  33.0*  33.7   RDW  49.1  48.6   PLATELETCT  141*  113*   MPV  10.9  10.8     Recent Labs      09/10/18   1000  09/11/18   0508   SODIUM  136  134*   POTASSIUM  3.9  3.7   CHLORIDE  105  104   CO2  24  23   GLUCOSE  156*  106*   BUN  22  16   CREATININE  1.47*  1.33   CALCIUM  8.6  8.2*                      Assessment/Plan     Falls   Assessment & Plan    - no fractures noted on XR, CT of hip and femur  - lives at [facility]  -PT/OT recs pending          Dementia- (present on admission)   Assessment & Plan    -At baseline        Chronic kidney disease, stage III (moderate)- (present on admission)   Assessment & Plan    RADHA on admission, however with IVF, cr improving  -Good urinary output  -Son states that patient has not been drinking as much as normal, continue these overnight, can likely be stopped soon if patient is tolerating p.o.        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  Cerebral atrophy.    2.  White matter lucencies most consistent with small vessel ischemic change versus demyelination or gliosis.    3.  Otherwise, Head CT without  contrast with no acute findings. No evidence of acute cerebral infarction, hemorrhage or mass lesion.    Ct hip:  1.  No evidence of right hip fracture. Subtle, nondisplaced impacted fractures are difficult to entirely exclude.

## 2018-09-11 NOTE — PROGRESS NOTES
Pt refused 0200 vitals and refused morning medications. Pt very confused. Pt reoriented. Pt continues to refuse all medications at this time.

## 2018-09-11 NOTE — PROGRESS NOTES
MD Garcia and son updated on patient progress. Patient ambulated to bathroom with this RN and had continent BM. Pt requires frequent reorienting and assistance.

## 2018-09-11 NOTE — ASSESSMENT & PLAN NOTE
- no fractures noted on XR, CT of hip and femur  - lives at [facility]  -PT/OT recs HH, orders placed, SW consulted

## 2018-09-11 NOTE — RESPIRATORY CARE
COPD EDUCATION by COPD CLINICAL EDUCATOR  9/11/2018 at 7:48 AM by Che Charlton     Patient reviewed by COPD education team. Patient does not qualify for COPD program.

## 2018-09-11 NOTE — DISCHARGE PLANNING
Admitted for fall. Pt from [facility], unknown name at this time. Imaging shows no changes.     Pending PT/OT.

## 2018-09-11 NOTE — CARE PLAN
Problem: Safety  Goal: Will remain free from falls  Outcome: PROGRESSING AS EXPECTED  Bed alarm in place, patient will call for assistance

## 2018-09-11 NOTE — PROGRESS NOTES
Report received from Day RN, assumed care of pt at this time. POC reviewed with pt. Pt verbalized understanding. Pt oriented to self at this time. Safety measures in place. Will continue to monitor.

## 2018-09-11 NOTE — PROGRESS NOTES
2 RN skin assessment completed. Pt has abrasion to right elbow, covered with tegaderm dressing. Small scab to right shin. Otherwise skin is intact.

## 2018-09-12 PROCEDURE — 96376 TX/PRO/DX INJ SAME DRUG ADON: CPT

## 2018-09-12 PROCEDURE — 99225 PR SUBSEQUENT OBSERVATION CARE,LEVEL II: CPT | Performed by: HOSPITALIST

## 2018-09-12 PROCEDURE — 700102 HCHG RX REV CODE 250 W/ 637 OVERRIDE(OP): Performed by: INTERNAL MEDICINE

## 2018-09-12 PROCEDURE — 96375 TX/PRO/DX INJ NEW DRUG ADDON: CPT

## 2018-09-12 PROCEDURE — A9270 NON-COVERED ITEM OR SERVICE: HCPCS | Performed by: INTERNAL MEDICINE

## 2018-09-12 PROCEDURE — 96372 THER/PROPH/DIAG INJ SC/IM: CPT

## 2018-09-12 PROCEDURE — 96366 THER/PROPH/DIAG IV INF ADDON: CPT

## 2018-09-12 PROCEDURE — G0378 HOSPITAL OBSERVATION PER HR: HCPCS

## 2018-09-12 RX ORDER — ASPIRIN 325 MG
325 TABLET ORAL DAILY
Status: DISCONTINUED | OUTPATIENT
Start: 2018-09-13 | End: 2018-09-13 | Stop reason: HOSPADM

## 2018-09-12 RX ADMIN — OMEPRAZOLE 20 MG: 20 CAPSULE, DELAYED RELEASE ORAL at 05:34

## 2018-09-12 RX ADMIN — LOSARTAN POTASSIUM 50 MG: 25 TABLET, FILM COATED ORAL at 05:35

## 2018-09-12 RX ADMIN — VITAMIN D, TAB 1000IU (100/BT) 1000 UNITS: 25 TAB at 05:36

## 2018-09-12 RX ADMIN — ESCITALOPRAM OXALATE 10 MG: 10 TABLET ORAL at 05:36

## 2018-09-12 RX ADMIN — SENNOSIDES AND DOCUSATE SODIUM 2 TABLET: 8.6; 5 TABLET ORAL at 18:11

## 2018-09-12 RX ADMIN — ATORVASTATIN CALCIUM 40 MG: 40 TABLET, FILM COATED ORAL at 21:35

## 2018-09-12 RX ADMIN — AMLODIPINE BESYLATE 5 MG: 5 TABLET ORAL at 05:36

## 2018-09-12 RX ADMIN — MEMANTINE HYDROCHLORIDE 5 MG: 10 TABLET ORAL at 05:35

## 2018-09-12 RX ADMIN — TAMSULOSIN HYDROCHLORIDE 0.4 MG: 0.4 CAPSULE ORAL at 18:11

## 2018-09-12 RX ADMIN — TIOTROPIUM BROMIDE 1 CAPSULE: 18 CAPSULE ORAL; RESPIRATORY (INHALATION) at 05:37

## 2018-09-12 ASSESSMENT — PAIN SCALES - GENERAL
PAINLEVEL_OUTOF10: 0

## 2018-09-12 NOTE — DISCHARGE PLANNING
KLARISSA received phone call back from Dr. Nunez regarding setting up discharge plan.  SW returned the call and left message.

## 2018-09-12 NOTE — CARE PLAN
Problem: Safety  Goal: Will remain free from injury  Outcome: PROGRESSING AS EXPECTED    Intervention: Provide assistance with mobility   09/12/18 0234   OTHER   Assistance / Tolerance Assistance of One;Tolerates Well   Pt educated to call for assistance when needed. Bed alarm on      Problem: Venous Thromboembolism (VTW)/Deep Vein Thrombosis (DVT) Prevention:  Goal: Patient will participate in Venous Thrombosis (VTE)/Deep Vein Thrombosis (DVT)Prevention Measures  Outcome: PROGRESSING AS EXPECTED   09/11/18 2000   Mechanical/VTE Prophylaxis   Mechanical Prophylaxis  SCDs, Sequential Compression Device   SCDs, Sequential Compression Device On

## 2018-09-12 NOTE — FACE TO FACE
Face to Face Supporting Documentation - Home Health    The encounter with this patient was in whole or in part the primary reason for home health admission.    Date of encounter:   Patient:                    MRN:                       YOB: 2018  Jie Nunez  [id number]  9/26/1926     Home health to see patient for:  Skilled Nursing care for assessment, interventions & education, Home health aide, Physical Therapy evaluation and treatment, Occupational therapy evaluation and treatment and Speech Language Pathology evaluation and treatment    Skilled need for:  Exacerbation of Chronic Disease State worsening dementia    Skilled nursing interventions to include:  Comment: need nursing help aide, PT/OT/ST    Homebound status evidenced by:  Need the aid of supportive devices such as crutches, canes, wheelchairs or walkers, Require the use of special transportation, Needs the assistance of another person in order to leave the home or Have a condition such that leaving his or her home is medically contraindicated. Leaving home requires a considerable and taxing effort. There is a normal inability to leave the home.    Community Physician to provide follow up care: Thelma Cervantes M.D.     Optional Interventions? No      I certify the face to face encounter for this home health care referral meets the CMS requirements and the encounter/clinical assessment with the patient was, in whole, or in part, for the medical condition(s) listed above, which is the primary reason for home health care. Based on my clinical findings: the service(s) are medically necessary, support the need for home health care, and the homebound criteria are met.  I certify that this patient has had a face to face encounter by myself.  Chana Garcia M.D. - NPI: [id number]

## 2018-09-12 NOTE — DISCHARGE PLANNING
Received Choice form at 1540 from [facility].  Agency/Facility Name: [facility]  Referral sent per Choice form at 1543.

## 2018-09-12 NOTE — DISCHARGE PLANNING
SW called and left this patient's son a message regarding discharge planing.  SW will await a call back.

## 2018-09-13 VITALS
SYSTOLIC BLOOD PRESSURE: 157 MMHG | RESPIRATION RATE: 18 BRPM | HEIGHT: 67 IN | DIASTOLIC BLOOD PRESSURE: 71 MMHG | TEMPERATURE: 97.9 F | WEIGHT: 157.08 LBS | OXYGEN SATURATION: 93 % | HEART RATE: 60 BPM | BODY MASS INDEX: 24.65 KG/M2

## 2018-09-13 PROCEDURE — G0378 HOSPITAL OBSERVATION PER HR: HCPCS

## 2018-09-13 PROCEDURE — 99217 PR OBSERVATION CARE DISCHARGE: CPT | Performed by: HOSPITALIST

## 2018-09-13 PROCEDURE — A9270 NON-COVERED ITEM OR SERVICE: HCPCS | Performed by: HOSPITALIST

## 2018-09-13 PROCEDURE — 700102 HCHG RX REV CODE 250 W/ 637 OVERRIDE(OP): Performed by: HOSPITALIST

## 2018-09-13 PROCEDURE — 700102 HCHG RX REV CODE 250 W/ 637 OVERRIDE(OP): Performed by: INTERNAL MEDICINE

## 2018-09-13 PROCEDURE — A9270 NON-COVERED ITEM OR SERVICE: HCPCS | Performed by: INTERNAL MEDICINE

## 2018-09-13 RX ORDER — ASPIRIN 325 MG
325 TABLET ORAL DAILY
Qty: 100 TAB | Refills: 0 | Status: SHIPPED | OUTPATIENT
Start: 2018-09-14 | End: 2019-01-26 | Stop reason: CLARIF

## 2018-09-13 RX ADMIN — SENNOSIDES AND DOCUSATE SODIUM 2 TABLET: 8.6; 5 TABLET ORAL at 05:59

## 2018-09-13 RX ADMIN — MEMANTINE HYDROCHLORIDE 5 MG: 10 TABLET ORAL at 05:59

## 2018-09-13 RX ADMIN — ASPIRIN 325 MG ORAL TABLET 325 MG: 325 PILL ORAL at 06:01

## 2018-09-13 RX ADMIN — PSYLLIUM HUSK 1 PACKET: 3.4 POWDER ORAL at 06:02

## 2018-09-13 RX ADMIN — AMLODIPINE BESYLATE 5 MG: 5 TABLET ORAL at 06:00

## 2018-09-13 RX ADMIN — LOSARTAN POTASSIUM 50 MG: 25 TABLET, FILM COATED ORAL at 06:00

## 2018-09-13 RX ADMIN — ESCITALOPRAM OXALATE 10 MG: 10 TABLET ORAL at 05:59

## 2018-09-13 RX ADMIN — TIOTROPIUM BROMIDE 1 CAPSULE: 18 CAPSULE ORAL; RESPIRATORY (INHALATION) at 06:08

## 2018-09-13 RX ADMIN — VITAMIN D, TAB 1000IU (100/BT) 1000 UNITS: 25 TAB at 06:00

## 2018-09-13 RX ADMIN — OMEPRAZOLE 20 MG: 20 CAPSULE, DELAYED RELEASE ORAL at 05:58

## 2018-09-13 ASSESSMENT — PAIN SCALES - GENERAL: PAINLEVEL_OUTOF10: 0

## 2018-09-13 NOTE — DISCHARGE PLANNING
KLARISSA spoke with Kita from [facility].  Patient is clear to return home today.

## 2018-09-13 NOTE — DISCHARGE INSTRUCTIONS
Discharge Instructions    Discharged to home by car with relative. Discharged via wheelchair, hospital escort: Yes.  Special equipment needed: Not Applicable    Be sure to schedule a follow-up appointment with your primary care doctor or any specialists as instructed.     Discharge Plan:   Influenza Vaccine Indication: Not indicated: Previously immunized this influenza season and > 8 years of age    I understand that a diet low in cholesterol, fat, and sodium is recommended for good health. Unless I have been given specific instructions below for another diet, I accept this instruction as my diet prescription.   Other diet: Regular    Special Instructions: None    · Is patient discharged on Warfarin / Coumadin?   No     Depression / Suicide Risk    As you are discharged from this [facility] facility, it is important to learn how to keep safe from harming yourself.    Recognize the warning signs:  · Abrupt changes in personality, positive or negative- including increase in energy   · Giving away possessions  · Change in eating patterns- significant weight changes-  positive or negative  · Change in sleeping patterns- unable to sleep or sleeping all the time   · Unwillingness or inability to communicate  · Depression  · Unusual sadness, discouragement and loneliness  · Talk of wanting to die  · Neglect of personal appearance   · Rebelliousness- reckless behavior  · Withdrawal from people/activities they love  · Confusion- inability to concentrate     If you or a loved one observes any of these behaviors or has concerns about self-harm, here's what you can do:  · Talk about it- your feelings and reasons for harming yourself  · Remove any means that you might use to hurt yourself (examples: pills, rope, extension cords, firearm)  · Get professional help from the community (Mental Health, Substance Abuse, psychological counseling)  · Do not be alone:Call your Safe Contact- someone whom you trust who will be there for  you.  · Call your local CRISIS HOTLINE 196-2025 or 346-728-6750  · Call your local Children's Mobile Crisis Response Team [place] (382) 239-6882 or www.M[employer]link  · Call the toll free National Suicide Prevention Hotlines   · National Suicide Prevention Lifeline 520-511-CAOK (3900)  · National [employer] Line Network 800-SUICIDE (889-3756)

## 2018-09-13 NOTE — CARE PLAN
Problem: Safety  Goal: Will remain free from injury  Outcome: PROGRESSING AS EXPECTED    Intervention: Provide assistance with mobility   09/13/18 0150   OTHER   Assistance / Tolerance Assistance of One;Tolerates Well   Bed alarm on       Problem: Venous Thromboembolism (VTW)/Deep Vein Thrombosis (DVT) Prevention:  Goal: Patient will participate in Venous Thrombosis (VTE)/Deep Vein Thrombosis (DVT)Prevention Measures  Outcome: PROGRESSING AS EXPECTED   09/12/18 2100   Mechanical/VTE Prophylaxis   Mechanical Prophylaxis  SCDs, Sequential Compression Device   SCDs, Sequential Compression Device On

## 2018-09-13 NOTE — PROGRESS NOTES
Discharged pt per MD orders. Pt's son in law states understanding of d/c instructions, education, prescriptions, and f/u appts. Pt tolerating oral pain medication and diet. 02 sats above 90 and pt has voided. IV discontinued. Pt off unit with son in law.

## 2018-09-13 NOTE — PROGRESS NOTES
[facility]ist Progress Note    Date of Service: 2018    Chief Complaint  89 y.o. male admitted 9/10/2018 with fall, patient lives in memory care    Interval Problem Update  Patient awake this morning, more alert however still AAO x 2  No family at bedside. Did speak to his son in detail about dc planning back to memory care with HH.    Consultants/Specialty  none    Disposition  Memory care with HH        Review of Systems   Unable to perform ROS: Dementia      Physical Exam  Laboratory/Imaging   Hemodynamics  Temp (24hrs), Av.4 °C (97.6 °F), Min:36.4 °C (97.5 °F), Max:36.6 °C (97.8 °F)   Temperature: 36.6 °C (97.8 °F)  Pulse  Av.7  Min: 58  Max: 69    Blood Pressure : 141/66      Respiratory      Respiration: 18, Pulse Oximetry: 98 %             Fluids    Intake/Output Summary (Last 24 hours) at 18 2139  Last data filed at 18 1900   Gross per 24 hour   Intake              960 ml   Output                0 ml   Net              960 ml       Nutrition  Orders Placed This Encounter   Procedures   • Diet Order Regular     Standing Status:   Standing     Number of Occurrences:   1     Order Specific Question:   Diet:     Answer:   Regular [1]     Physical Exam   Constitutional: He appears well-developed and well-nourished. No distress.   HENT:   Head: Normocephalic and atraumatic.   Mouth/Throat: Oropharynx is clear and moist. No oropharyngeal exudate.   Eyes: Pupils are equal, round, and reactive to light. Conjunctivae are normal. No scleral icterus.   Neck: Normal range of motion. Neck supple. No JVD present.   Cardiovascular: Normal rate, regular rhythm and intact distal pulses.    No murmur heard.  Pulmonary/Chest: Effort normal and breath sounds normal. No respiratory distress. He has no wheezes.   Abdominal: Soft. Bowel sounds are normal. He exhibits no distension. There is no tenderness.   Musculoskeletal: Normal range of motion. He exhibits no edema or tenderness.   Lymphadenopathy:      He has no cervical adenopathy.   Neurological: He is alert. He exhibits normal muscle tone.   Skin: Skin is warm and dry. No rash noted. No erythema.   Psychiatric: He has a normal mood and affect. His behavior is normal.       Recent Labs      09/10/18   1000  09/11/18   0508   WBC  11.6*  9.0   RBC  4.08*  3.79*   HEMOGLOBIN  13.3*  12.2*   HEMATOCRIT  40.3*  36.2*   MCV  98.8*  95.5   MCH  32.6  32.2   MCHC  33.0*  33.7   RDW  49.1  48.6   PLATELETCT  141*  113*   MPV  10.9  10.8     Recent Labs      09/10/18   1000  09/11/18   0508   SODIUM  136  134*   POTASSIUM  3.9  3.7   CHLORIDE  105  104   CO2  24  23   GLUCOSE  156*  106*   BUN  22  16   CREATININE  1.47*  1.33   CALCIUM  8.6  8.2*                      Assessment/Plan     Falls   Assessment & Plan    - no fractures noted on XR, CT of hip and femur  - lives at [facility]  -PT/OT recs HH, orders placed, SW consulted        Dementia- (present on admission)   Assessment & Plan    -At baseline        Chronic kidney disease, stage III (moderate)- (present on admission)   Assessment & Plan    RADHA on admission, however with IVF, cr improving  -Good urinary output        HTN (hypertension)- (present on admission)   Assessment & Plan    -Continue losartan, amlodipine  -Place PRN medication and adjust as needed        Paroxysmal atrial fibrillation (CMS-HCC)- (present on admission)   Assessment & Plan    -Sinus, not on rate control or antiarrhythmics  -Patient is on Pradaxa, 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           Quality-Core Measures   Reviewed items::  Labs reviewed, Medications reviewed and Radiology images reviewed  Salinas catheter::  No Salinas     hct  1.  Cerebral atrophy.    2.  White matter lucencies most consistent with small vessel ischemic change versus demyelination or gliosis.    3.  Otherwise, Head CT  without contrast with no acute findings. No evidence of acute cerebral infarction, hemorrhage or mass lesion.    Ct hip:  1.  No evidence of right hip fracture. Subtle, nondisplaced impacted fractures are difficult to entirely exclude.

## 2018-09-13 NOTE — DISCHARGE PLANNING
Agency/Facility Name: [facility]  Spoke To: Siomara  Outcome: Patient accepted. Siomara informed patient is to discharge today. FESTUS Donahue notified of acceptance.

## 2018-09-13 NOTE — DISCHARGE SUMMARY
Discharge Summary    CHIEF COMPLAINT ON ADMISSION  Chief Complaint   Patient presents with   • T-5000 FALL     fell at care facility around 0230 this morning   • Hip Injury     Right   • Leg Pain     Right   • Elbow Pain     Right, abrasion to elbow       Reason for Admission  fall right hip pain     Admission Date  9/10/2018    CODE STATUS  DNAR/DNI    HPI & HOSPITAL COURSE  89 y.o. male who presented 9/10/2018 with fall.  Patient does live in a memory care facility, had a fall.  His son who is a GI doctor was called, he arrived there and tried to take him for lunch.  Patient was unable to walk so he decided to bring him to the emergency department.  Patient did have imaging, no fractures noted.  Patient did have a right elbow abrasion, now has bandage on this.  Patient recently fell and did have a large laceration on his left elbow which has healed. He was admitted for further evaluation. No fractures noted on CR , CT of hip and femur,PT/OT evaluated patient and recommended ongoing PT, I ordered HH for his memory care, SW assisted, His dementia was at baseline while in hospital. Vitals were monitored, electrolytes replaced as needed. He had a slight RADHA on admission which resolved with IVF. He has hx of PAF and was on pradaxa. We discussed the increased risks for bleeding with his falls, I touched base with his cardiologist who also agreed, I also touched base with his son who was in agreement of stopping blood thinner and placing him on ASA 325mg. At this time patient back to baseline and stable for dc back to memory care with HH. Son understood and agreed with the above plan       Therefore, he is discharged in fair and stable condition memory care with home health  Patient was observation status    Discharge Date  9/13/2018    FOLLOW UP ITEMS POST DISCHARGE  pcp    DISCHARGE DIAGNOSES  Active Problems:    Falls POA: Unknown    Dementia POA: Yes    Paroxysmal atrial fibrillation (CMS-HCC) POA: Yes    HTN  (hypertension) POA: Yes    Chronic kidney disease, stage III (moderate) POA: Yes  Resolved Problems:    * No resolved hospital problems. *      FOLLOW UP  No future appointments.  Thelma Cervantes M.D.  [address] [address]  [place] [id number] [phone]          [facility]  [address] [phone]          MEDICATIONS ON DISCHARGE     Medication List      START taking these medications      Instructions   aspirin 325 MG Tabs  Commonly known as:  ASA   Take 1 Tab by mouth every day.  Dose:  325 mg        CHANGE how you take these medications      Instructions   losartan 25 MG Tabs  What changed:  Another medication with the same name was removed. Continue taking this medication, and follow the directions you see here.  Commonly known as:  COZAAR   Take 25 mg by mouth every morning.  Dose:  25 mg        CONTINUE taking these medications      Instructions   amLODIPine 5 MG Tabs  Commonly known as:  NORVASC   Take 5 mg by mouth every day.  Dose:  5 mg     atorvastatin 40 MG Tabs  Commonly known as:  LIPITOR   Take 40 mg by mouth every evening.  Dose:  40 mg     escitalopram 10 MG Tabs  Commonly known as:  LEXAPRO   Take 10 mg by mouth every day.  Dose:  10 mg     memantine 5 MG Tabs  Commonly known as:  NAMENDA   Take 5 mg by mouth every day.  Dose:  5 mg     omeprazole 20 MG delayed-release capsule  Commonly known as:  PRILOSEC   Take 20 mg by mouth every day.  Dose:  20 mg     psyllium 58.12 % Pack  Commonly known as:  METAMUCIL   Take 1 Packet by mouth every day.  Dose:  1 Packet     tamsulosin 0.4 MG capsule  Commonly known as:  FLOMAX   Take 0.4 mg by mouth every evening.  Dose:  0.4 mg     tiotropium 18 MCG Caps  Commonly known as:  SPIRIVA   Inhale 18 mcg by mouth every day.  Dose:  18 mcg     vitamin D 1000 UNIT Tabs  Commonly known as:  cholecalciferol   Take 1,000 Units by mouth every day.  Dose:  1000 Units        STOP taking these medications    dabigatran 75 MG Caps  capsule  Commonly known as:  PRADAXA            Allergies  Allergies   Allergen Reactions   • Beta Adrenergic Blockers Anaphylaxis     HR goes into teens, Cardiology wants him to never have another beta blocker   • Penicillins      Unknown reaction         DIET  Orders Placed This Encounter   Procedures   • Diet Order Regular     Standing Status:   Standing     Number of Occurrences:   1     Order Specific Question:   Diet:     Answer:   Regular [1]       ACTIVITY  As tolerated.  Weight bearing as tolerated    CONSULTATIONS  cardiology    PROCEDURES  none    LABORATORY  Lab Results   Component Value Date    SODIUM 134 (L) 09/11/2018    POTASSIUM 3.7 09/11/2018    CHLORIDE 104 09/11/2018    CO2 23 09/11/2018    GLUCOSE 106 (H) 09/11/2018    BUN 16 09/11/2018    CREATININE 1.33 09/11/2018        Lab Results   Component Value Date    WBC 9.0 09/11/2018    HEMOGLOBIN 12.2 (L) 09/11/2018    HEMATOCRIT 36.2 (L) 09/11/2018    PLATELETCT 113 (L) 09/11/2018        Total time of the discharge process exceeds 40 minutes.

## 2018-09-13 NOTE — PROGRESS NOTES
"Received report from NOC shift RN and assumed care. Pt is slightly confused, states that he knows he is on a \"unit.\" Reoriented pt. Pt has been incontinent of BM and urine in his bed this am, cleaned pt and changed gown, and then assisted pt up to bedside chair for breakfast. Pt has no complaints of pain at this time.   "

## 2018-10-18 ENCOUNTER — HOSPITAL ENCOUNTER (OUTPATIENT)
Dept: LAB | Facility: MEDICAL CENTER | Age: 83
End: 2018-10-18
Attending: NURSE PRACTITIONER
Payer: MEDICARE

## 2018-10-18 LAB
ALBUMIN SERPL BCP-MCNC: 4.3 G/DL (ref 3.2–4.9)
ALBUMIN/GLOB SERPL: 1.3 G/DL
ALP SERPL-CCNC: 166 U/L (ref 30–99)
ALT SERPL-CCNC: 10 U/L (ref 2–50)
ANION GAP SERPL CALC-SCNC: 8 MMOL/L (ref 0–11.9)
AST SERPL-CCNC: 15 U/L (ref 12–45)
BASOPHILS # BLD AUTO: 1.1 % (ref 0–1.8)
BASOPHILS # BLD: 0.09 K/UL (ref 0–0.12)
BILIRUB SERPL-MCNC: 1.2 MG/DL (ref 0.1–1.5)
BUN SERPL-MCNC: 31 MG/DL (ref 8–22)
CALCIUM SERPL-MCNC: 9.8 MG/DL (ref 8.5–10.5)
CHLORIDE SERPL-SCNC: 101 MMOL/L (ref 96–112)
CO2 SERPL-SCNC: 27 MMOL/L (ref 20–33)
CREAT SERPL-MCNC: 1.88 MG/DL (ref 0.5–1.4)
EOSINOPHIL # BLD AUTO: 0.24 K/UL (ref 0–0.51)
EOSINOPHIL NFR BLD: 2.9 % (ref 0–6.9)
ERYTHROCYTE [DISTWIDTH] IN BLOOD BY AUTOMATED COUNT: 48 FL (ref 35.9–50)
GLOBULIN SER CALC-MCNC: 3.3 G/DL (ref 1.9–3.5)
GLUCOSE SERPL-MCNC: 112 MG/DL (ref 65–99)
HCT VFR BLD AUTO: 41.5 % (ref 42–52)
HGB BLD-MCNC: 14 G/DL (ref 14–18)
IMM GRANULOCYTES # BLD AUTO: 0.03 K/UL (ref 0–0.11)
IMM GRANULOCYTES NFR BLD AUTO: 0.4 % (ref 0–0.9)
LYMPHOCYTES # BLD AUTO: 1.43 K/UL (ref 1–4.8)
LYMPHOCYTES NFR BLD: 17.5 % (ref 22–41)
MCH RBC QN AUTO: 32.8 PG (ref 27–33)
MCHC RBC AUTO-ENTMCNC: 33.7 G/DL (ref 33.7–35.3)
MCV RBC AUTO: 97.2 FL (ref 81.4–97.8)
MONOCYTES # BLD AUTO: 0.62 K/UL (ref 0–0.85)
MONOCYTES NFR BLD AUTO: 7.6 % (ref 0–13.4)
NEUTROPHILS # BLD AUTO: 5.77 K/UL (ref 1.82–7.42)
NEUTROPHILS NFR BLD: 70.5 % (ref 44–72)
NRBC # BLD AUTO: 0 K/UL
NRBC BLD-RTO: 0 /100 WBC
PLATELET # BLD AUTO: 159 K/UL (ref 164–446)
PMV BLD AUTO: 11.4 FL (ref 9–12.9)
POTASSIUM SERPL-SCNC: 4.5 MMOL/L (ref 3.6–5.5)
PROT SERPL-MCNC: 7.6 G/DL (ref 6–8.2)
RBC # BLD AUTO: 4.27 M/UL (ref 4.7–6.1)
SODIUM SERPL-SCNC: 136 MMOL/L (ref 135–145)
WBC # BLD AUTO: 8.2 K/UL (ref 4.8–10.8)

## 2018-10-18 PROCEDURE — 85025 COMPLETE CBC W/AUTO DIFF WBC: CPT

## 2018-10-18 PROCEDURE — 80053 COMPREHEN METABOLIC PANEL: CPT

## 2018-10-18 PROCEDURE — 36415 COLL VENOUS BLD VENIPUNCTURE: CPT

## 2018-12-12 ENCOUNTER — OFFICE VISIT (OUTPATIENT)
Dept: CARDIOLOGY | Facility: MEDICAL CENTER | Age: 83
End: 2018-12-12
Payer: MEDICARE

## 2018-12-12 VITALS
HEIGHT: 67 IN | DIASTOLIC BLOOD PRESSURE: 70 MMHG | BODY MASS INDEX: 25.9 KG/M2 | HEART RATE: 62 BPM | WEIGHT: 165 LBS | OXYGEN SATURATION: 94 % | SYSTOLIC BLOOD PRESSURE: 122 MMHG

## 2018-12-12 DIAGNOSIS — I48.19 PERSISTENT ATRIAL FIBRILLATION (HCC): ICD-10-CM

## 2018-12-12 DIAGNOSIS — R79.89 ELEVATED BRAIN NATRIURETIC PEPTIDE (BNP) LEVEL: ICD-10-CM

## 2018-12-12 DIAGNOSIS — R60.0 LOCALIZED EDEMA: ICD-10-CM

## 2018-12-12 DIAGNOSIS — I10 ESSENTIAL HYPERTENSION: ICD-10-CM

## 2018-12-12 PROCEDURE — 99214 OFFICE O/P EST MOD 30 MIN: CPT | Performed by: INTERNAL MEDICINE

## 2018-12-12 RX ORDER — ASCORBIC ACID 500 MG
500 TABLET ORAL DAILY
Status: ON HOLD | COMMUNITY
End: 2019-08-02

## 2018-12-12 RX ORDER — ALBUTEROL SULFATE 90 UG/1
2 AEROSOL, METERED RESPIRATORY (INHALATION) EVERY 6 HOURS PRN
COMMUNITY
End: 2019-07-31

## 2018-12-12 RX ORDER — ASPIRIN 81 MG/1
81 TABLET ORAL DAILY
Refills: 6 | COMMUNITY
Start: 2018-11-12 | End: 2019-01-26 | Stop reason: CLARIF

## 2018-12-12 NOTE — PROGRESS NOTES
Chief Complaint   Patient presents with   • CHF (Diastolic)     F/V: 3 MO       Subjective:   Jie Nunez is a 89 y.o. male who presents today for follow-up with a history of persistent atrial fibrillation and hypertension.  He also has a permanent pacemaker in place.  He has been taken off of anticoagulation therapy because of falling.    He is noted some increasing edema over the last couple of months.  He has no dyspnea on exertion but his activity level is limited.  He does walk with a walker.  He is noted no PND or orthopnea.  He is noted no chest discomfort, palpitations or lightheadedness.    Past Medical History:   Diagnosis Date   • Arrhythmia    • Atrial fibrillation (HCC)    • Cancer (HCC)     prostate   • Congestive heart failure (HCC)    • Hypertension      Past Surgical History:   Procedure Laterality Date   • CHOLECYSTECTOMY     • GYN SURGERY      leyda   • LUMBAR FUSION POSTERIOR     • OTHER CARDIAC SURGERY      A-fib   • OTHER CARDIAC SURGERY      chf   • OTHER ORTHOPEDIC SURGERY      lumbar lami   • SHOULDER ARTHROSCOPY       Family History   Problem Relation Age of Onset   • Heart Disease Neg Hx    • Heart Failure Neg Hx      Social History     Social History   • Marital status: Single     Spouse name: N/A   • Number of children: N/A   • Years of education: N/A     Occupational History   • Not on file.     Social History Main Topics   • Smoking status: Never Smoker   • Smokeless tobacco: Never Used   • Alcohol use No   • Drug use: No   • Sexual activity: Not on file     Other Topics Concern   • Not on file     Social History Narrative    ** Merged History Encounter **          Allergies   Allergen Reactions   • Beta Adrenergic Blockers Anaphylaxis     HR goes into teens, Cardiology wants him to never have another beta blocker   • Penicillins      Unknown reaction       Outpatient Encounter Prescriptions as of 12/12/2018   Medication Sig Dispense Refill   • ASPIRIN ADULT LOW STRENGTH 81 MG EC  "tablet Take 81 mg by mouth every day.  6   • ascorbic acid (ASCORBIC ACID) 500 MG Tab Take 500 mg by mouth every day.     • albuterol (PROAIR HFA) 108 (90 Base) MCG/ACT Aero Soln inhalation aerosol Inhale 2 Puffs by mouth every 6 hours as needed for Shortness of Breath.     • Psyllium (REGULOID PO) Take 8 oz by mouth every day.     • amLODIPine (NORVASC) 5 MG Tab Take 5 mg by mouth every day.     • escitalopram (LEXAPRO) 10 MG Tab Take 10 mg by mouth every day.     • omeprazole (PRILOSEC) 20 MG delayed-release capsule Take 20 mg by mouth every day.     • atorvastatin (LIPITOR) 40 MG Tab Take 40 mg by mouth every evening.     • memantine (NAMENDA) 5 MG Tab Take 5 mg by mouth every day.     • tamsulosin (FLOMAX) 0.4 MG capsule Take 0.4 mg by mouth every evening.     • tiotropium (SPIRIVA) 18 MCG Cap Inhale 18 mcg by mouth every day.     • Cholecalciferol (VITAMIN D PO) Take 50,000 Units by mouth every Friday.     • aspirin (ASA) 325 MG Tab Take 1 Tab by mouth every day. (Patient not taking: Reported on 12/12/2018) 100 Tab 0   • losartan (COZAAR) 25 MG Tab Take 25 mg by mouth every morning.     • psyllium (METAMUCIL) 58.12 % Pack Take 1 Packet by mouth every day.       No facility-administered encounter medications on file as of 12/12/2018.      ROS     Objective:   /70 (BP Location: Right arm, Patient Position: Sitting, BP Cuff Size: Adult)   Pulse 62   Ht 1.702 m (5' 7\")   Wt 74.8 kg (165 lb)   SpO2 94%   BMI 25.84 kg/m²     Physical Exam   Constitutional: He appears well-developed and well-nourished.   Neck: No JVD present.   Cardiovascular: Normal rate and regular rhythm.    No murmur heard.  Pulmonary/Chest: Effort normal. He has rales (Right base).   Abdominal: Soft. There is no tenderness.   Musculoskeletal: He exhibits edema (2+ pretibial).     Lab Results   Component Value Date/Time    CHOLSTRLTOT 86 (L) 02/10/2018 03:40 AM    LDL 44 02/10/2018 03:40 AM    HDL 27 (A) 02/10/2018 03:40 AM    " TRIGLYCERIDE 77 02/10/2018 03:40 AM       Lab Results   Component Value Date/Time    SODIUM 136 10/18/2018 01:48 PM    POTASSIUM 4.5 10/18/2018 01:48 PM    CHLORIDE 101 10/18/2018 01:48 PM    CO2 27 10/18/2018 01:48 PM    GLUCOSE 112 (H) 10/18/2018 01:48 PM    BUN 31 (H) 10/18/2018 01:48 PM    CREATININE 1.88 (H) 10/18/2018 01:48 PM     Lab Results   Component Value Date/Time    ALKPHOSPHAT 166 (H) 10/18/2018 01:48 PM    ASTSGOT 15 10/18/2018 01:48 PM    ALTSGPT 10 10/18/2018 01:48 PM    TBILIRUBIN 1.2 10/18/2018 01:48 PM      Lab Results   Component Value Date/Time    BNPBTYPENAT 240 (H) 09/11/2017 07:10 AM      Transthoracic  Echo Report    CONCLUSIONS  Technically difficult and incomplete study.   Left ventricle is normal in size.  Normal left ventricular systolic function.  Left ventricular ejection fraction is visually estimated to be 70%.  Grossly normal regional wall motion.  Mild mitral regurgitation.  Severely dilated left atrium.  Moderately dilated right ventricle.  Normal right ventricular systolic function.  Vena cava is not well visualized.  Mild tricuspid regurgitation.  Estimated right ventricular systolic pressure  is 45-50 mmHg.  Compared to 11/2014, no significant change seen    JAI NUNEZ  Exam Date:         09/05/2017    Assessment:     1. Localized edema     2. Persistent atrial fibrillation (HCC)     3. Essential hypertension         Medical Decision Making:  Today's Assessment / Status / Plan:     Mr. Nunez is having difficulty with increasing edema.  He is not having much in the way of dyspnea though he does have a few crackles at the right base.  At this time, we will discontinue amlodipine which could be contributory.  In addition, will obtain a chest x-ray.  He will follow-up in a couple of weeks with a BMP and BNP prior.  Depending on his clinical course decide whether or not to reinstitute diuretic therapy or increase his antihypertensive therapy.

## 2018-12-12 NOTE — LETTER
[facility]   [address] [place] [address] [address]  [place] [id number]  Phone: [phone]  Fax: [phone]              Jie Nunez  9/26/1926    Encounter Date: 12/12/2018    Daniele Rebolledo M.D.          PROGRESS NOTE:  Chief Complaint   Patient presents with   • CHF (Diastolic)     F/V: 3 MO       Subjective:   Jie Nunez is a 89 y.o. male who presents today for follow-up with a history of persistent atrial fibrillation and hypertension.  He also has a permanent pacemaker in place.  He has been taken off of anticoagulation therapy because of falling.    He is noted some increasing edema over the last couple of months.  He has no dyspnea on exertion but his activity level is limited.  He does walk with a walker.  He is noted no PND or orthopnea.  He is noted no chest discomfort, palpitations or lightheadedness.    Past Medical History:   Diagnosis Date   • Arrhythmia    • Atrial fibrillation (HCC)    • Cancer (HCC)     prostate   • Congestive heart failure (HCC)    • Hypertension      Past Surgical History:   Procedure Laterality Date   • CHOLECYSTECTOMY     • GYN SURGERY      leyda   • LUMBAR FUSION POSTERIOR     • OTHER CARDIAC SURGERY      A-fib   • OTHER CARDIAC SURGERY      chf   • OTHER ORTHOPEDIC SURGERY      lumbar lami   • SHOULDER ARTHROSCOPY       Family History   Problem Relation Age of Onset   • Heart Disease Neg Hx    • Heart Failure Neg Hx      Social History     Social History   • Marital status: Single     Spouse name: N/A   • Number of children: N/A   • Years of education: N/A     Occupational History   • Not on file.     Social History Main Topics   • Smoking status: Never Smoker   • Smokeless tobacco: Never Used   • Alcohol use No   • Drug use: No   • Sexual activity: Not on file     Other Topics Concern   • Not on file     Social History Narrative    ** Merged History Encounter **          Allergies   Allergen Reactions   • Beta Adrenergic Blockers  "Anaphylaxis     HR goes into teens, Cardiology wants him to never have another beta blocker   • Penicillins      Unknown reaction       Outpatient Encounter Prescriptions as of 12/12/2018   Medication Sig Dispense Refill   • ASPIRIN ADULT LOW STRENGTH 81 MG EC tablet Take 81 mg by mouth every day.  6   • ascorbic acid (ASCORBIC ACID) 500 MG Tab Take 500 mg by mouth every day.     • albuterol (PROAIR HFA) 108 (90 Base) MCG/ACT Aero Soln inhalation aerosol Inhale 2 Puffs by mouth every 6 hours as needed for Shortness of Breath.     • Psyllium (REGULOID PO) Take 8 oz by mouth every day.     • amLODIPine (NORVASC) 5 MG Tab Take 5 mg by mouth every day.     • escitalopram (LEXAPRO) 10 MG Tab Take 10 mg by mouth every day.     • omeprazole (PRILOSEC) 20 MG delayed-release capsule Take 20 mg by mouth every day.     • atorvastatin (LIPITOR) 40 MG Tab Take 40 mg by mouth every evening.     • memantine (NAMENDA) 5 MG Tab Take 5 mg by mouth every day.     • tamsulosin (FLOMAX) 0.4 MG capsule Take 0.4 mg by mouth every evening.     • tiotropium (SPIRIVA) 18 MCG Cap Inhale 18 mcg by mouth every day.     • Cholecalciferol (VITAMIN D PO) Take 50,000 Units by mouth every Friday.     • aspirin (ASA) 325 MG Tab Take 1 Tab by mouth every day. (Patient not taking: Reported on 12/12/2018) 100 Tab 0   • losartan (COZAAR) 25 MG Tab Take 25 mg by mouth every morning.     • psyllium (METAMUCIL) 58.12 % Pack Take 1 Packet by mouth every day.       No facility-administered encounter medications on file as of 12/12/2018.      ROS     Objective:   /70 (BP Location: Right arm, Patient Position: Sitting, BP Cuff Size: Adult)   Pulse 62   Ht 1.702 m (5' 7\")   Wt 74.8 kg (165 lb)   SpO2 94%   BMI 25.84 kg/m²      Physical Exam   Constitutional: He appears well-developed and well-nourished.   Neck: No JVD present.   Cardiovascular: Normal rate and regular rhythm.    No murmur heard.  Pulmonary/Chest: Effort normal. He has rales (Right " base).   Abdominal: Soft. There is no tenderness.   Musculoskeletal: He exhibits edema (2+ pretibial).     Lab Results   Component Value Date/Time    CHOLSTRLTOT 86 (L) 02/10/2018 03:40 AM    LDL 44 02/10/2018 03:40 AM    HDL 27 (A) 02/10/2018 03:40 AM    TRIGLYCERIDE 77 02/10/2018 03:40 AM       Lab Results   Component Value Date/Time    SODIUM 136 10/18/2018 01:48 PM    POTASSIUM 4.5 10/18/2018 01:48 PM    CHLORIDE 101 10/18/2018 01:48 PM    CO2 27 10/18/2018 01:48 PM    GLUCOSE 112 (H) 10/18/2018 01:48 PM    BUN 31 (H) 10/18/2018 01:48 PM    CREATININE 1.88 (H) 10/18/2018 01:48 PM     Lab Results   Component Value Date/Time    ALKPHOSPHAT 166 (H) 10/18/2018 01:48 PM    ASTSGOT 15 10/18/2018 01:48 PM    ALTSGPT 10 10/18/2018 01:48 PM    TBILIRUBIN 1.2 10/18/2018 01:48 PM      Lab Results   Component Value Date/Time    BNPBTYPENAT 240 (H) 09/11/2017 07:10 AM      Transthoracic  Echo Report    CONCLUSIONS  Technically difficult and incomplete study.   Left ventricle is normal in size.  Normal left ventricular systolic function.  Left ventricular ejection fraction is visually estimated to be 70%.  Grossly normal regional wall motion.  Mild mitral regurgitation.  Severely dilated left atrium.  Moderately dilated right ventricle.  Normal right ventricular systolic function.  Vena cava is not well visualized.  Mild tricuspid regurgitation.  Estimated right ventricular systolic pressure  is 45-50 mmHg.  Compared to 11/2014, no significant change seen    JAI NUNEZ  Exam Date:         09/05/2017    Assessment:     1. Localized edema     2. Persistent atrial fibrillation (HCC)     3. Essential hypertension         Medical Decision Making:  Today's Assessment / Status / Plan:     Mr. Nunez is having difficulty with increasing edema.  He is not having much in the way of dyspnea though he does have a few crackles at the right base.  At this time, we will discontinue amlodipine which could be contributory.  In  addition, will obtain a chest x-ray.  He will follow-up in a couple of weeks with a BMP and BNP prior.  Depending on his clinical course decide whether or not to reinstitute diuretic therapy or increase his antihypertensive therapy.      Thelma Cervantes M.D.  [address] [address]  [facility] [id number]  VIA Mail

## 2018-12-12 NOTE — PATIENT INSTRUCTIONS
Discontinue amlodipine    Chest x-ray    BMP and BNP within the next 2 weeks    Follow-up in 2 weeks

## 2018-12-28 ENCOUNTER — HOSPITAL ENCOUNTER (OUTPATIENT)
Dept: RADIOLOGY | Facility: MEDICAL CENTER | Age: 83
End: 2018-12-28
Attending: INTERNAL MEDICINE
Payer: MEDICARE

## 2018-12-28 ENCOUNTER — HOSPITAL ENCOUNTER (OUTPATIENT)
Dept: LAB | Facility: MEDICAL CENTER | Age: 83
End: 2018-12-28
Attending: INTERNAL MEDICINE
Payer: MEDICARE

## 2018-12-28 DIAGNOSIS — I10 ESSENTIAL HYPERTENSION: ICD-10-CM

## 2018-12-28 DIAGNOSIS — R79.89 ELEVATED BRAIN NATRIURETIC PEPTIDE (BNP) LEVEL: ICD-10-CM

## 2018-12-28 DIAGNOSIS — R60.0 LOCALIZED EDEMA: ICD-10-CM

## 2018-12-28 DIAGNOSIS — I48.19 PERSISTENT ATRIAL FIBRILLATION (HCC): ICD-10-CM

## 2018-12-28 LAB
ANION GAP SERPL CALC-SCNC: 6 MMOL/L (ref 0–11.9)
BNP SERPL-MCNC: 174 PG/ML (ref 0–100)
BUN SERPL-MCNC: 28 MG/DL (ref 8–22)
CALCIUM SERPL-MCNC: 9 MG/DL (ref 8.5–10.5)
CHLORIDE SERPL-SCNC: 111 MMOL/L (ref 96–112)
CO2 SERPL-SCNC: 24 MMOL/L (ref 20–33)
CREAT SERPL-MCNC: 1.48 MG/DL (ref 0.5–1.4)
GLUCOSE SERPL-MCNC: 98 MG/DL (ref 65–99)
POTASSIUM SERPL-SCNC: 4.1 MMOL/L (ref 3.6–5.5)
SODIUM SERPL-SCNC: 141 MMOL/L (ref 135–145)

## 2018-12-28 PROCEDURE — 71046 X-RAY EXAM CHEST 2 VIEWS: CPT

## 2018-12-28 PROCEDURE — 83880 ASSAY OF NATRIURETIC PEPTIDE: CPT | Mod: GA

## 2018-12-28 PROCEDURE — 80048 BASIC METABOLIC PNL TOTAL CA: CPT

## 2018-12-28 PROCEDURE — 36415 COLL VENOUS BLD VENIPUNCTURE: CPT | Mod: GA

## 2018-12-31 ENCOUNTER — OFFICE VISIT (OUTPATIENT)
Dept: CARDIOLOGY | Facility: MEDICAL CENTER | Age: 83
End: 2018-12-31
Payer: MEDICARE

## 2018-12-31 ENCOUNTER — TELEPHONE (OUTPATIENT)
Dept: CARDIOLOGY | Facility: MEDICAL CENTER | Age: 83
End: 2018-12-31

## 2018-12-31 VITALS
HEART RATE: 64 BPM | HEIGHT: 67 IN | DIASTOLIC BLOOD PRESSURE: 70 MMHG | OXYGEN SATURATION: 96 % | BODY MASS INDEX: 25.9 KG/M2 | WEIGHT: 165 LBS | SYSTOLIC BLOOD PRESSURE: 124 MMHG

## 2018-12-31 DIAGNOSIS — I48.19 PERSISTENT ATRIAL FIBRILLATION (HCC): ICD-10-CM

## 2018-12-31 DIAGNOSIS — I10 ESSENTIAL HYPERTENSION: ICD-10-CM

## 2018-12-31 DIAGNOSIS — R60.0 LOCALIZED EDEMA: ICD-10-CM

## 2018-12-31 DIAGNOSIS — R79.89 ELEVATED BRAIN NATRIURETIC PEPTIDE (BNP) LEVEL: ICD-10-CM

## 2018-12-31 DIAGNOSIS — R40.0 DAYTIME SOMNOLENCE: ICD-10-CM

## 2018-12-31 PROCEDURE — 99214 OFFICE O/P EST MOD 30 MIN: CPT | Performed by: INTERNAL MEDICINE

## 2018-12-31 NOTE — PROGRESS NOTES
Chief Complaint   Patient presents with   • Atrial Fibrillation     Follow up   • Transient Ischemic Attack   • Hypertension       Subjective:   Jie Nunez is a 89 y.o. male who presents today  for follow-up with a history of persistent atrial fibrillation and hypertension.  He also has a permanent pacemaker in place.  He has been taken off of anticoagulation therapy because of falling.  He has been having difficulty with increase in edema.  We discontinued amlodipine at the time of his last visit.  He is undergone repeat evaluation with a chest x-ray and lab work.    He continues to have difficulty with edema which has not changed significantly.  He is noted no chest discomfort, dyspnea on exertion, PND, orthopnea, palpitations or lightheadedness.    His son relates that he has dyspnea on exertion at about a half a block using his walker.    Past Medical History:   Diagnosis Date   • Arrhythmia    • Atrial fibrillation (HCC)    • Cancer (HCC)     prostate   • Congestive heart failure (HCC)    • Hypertension      Past Surgical History:   Procedure Laterality Date   • CHOLECYSTECTOMY     • GYN SURGERY      leyda   • LUMBAR FUSION POSTERIOR     • OTHER CARDIAC SURGERY      A-fib   • OTHER CARDIAC SURGERY      chf   • OTHER ORTHOPEDIC SURGERY      lumbar lami   • SHOULDER ARTHROSCOPY       Family History   Problem Relation Age of Onset   • Heart Disease Neg Hx    • Heart Failure Neg Hx      Social History     Social History   • Marital status: Single     Spouse name: N/A   • Number of children: N/A   • Years of education: N/A     Occupational History   • Not on file.     Social History Main Topics   • Smoking status: Never Smoker   • Smokeless tobacco: Never Used   • Alcohol use No   • Drug use: No   • Sexual activity: Not on file     Other Topics Concern   • Not on file     Social History Narrative    ** Merged History Encounter **          Allergies   Allergen Reactions   • Beta Adrenergic Blockers Anaphylaxis     " HR goes into teens, Cardiology wants him to never have another beta blocker   • Penicillins      Unknown reaction       Outpatient Encounter Prescriptions as of 12/31/2018   Medication Sig Dispense Refill   • ASPIRIN ADULT LOW STRENGTH 81 MG EC tablet Take 81 mg by mouth every day.  6   • ascorbic acid (ASCORBIC ACID) 500 MG Tab Take 500 mg by mouth every day.     • Psyllium (REGULOID PO) Take 8 oz by mouth every day.     • escitalopram (LEXAPRO) 10 MG Tab Take 10 mg by mouth every day.     • omeprazole (PRILOSEC) 20 MG delayed-release capsule Take 20 mg by mouth every day.     • atorvastatin (LIPITOR) 40 MG Tab Take 40 mg by mouth every evening.     • memantine (NAMENDA) 5 MG Tab Take 5 mg by mouth every day.     • tamsulosin (FLOMAX) 0.4 MG capsule Take 0.4 mg by mouth every evening.     • tiotropium (SPIRIVA) 18 MCG Cap Inhale 18 mcg by mouth every day.     • Cholecalciferol (VITAMIN D PO) Take 50,000 Units by mouth every Friday.     • albuterol (PROAIR HFA) 108 (90 Base) MCG/ACT Aero Soln inhalation aerosol Inhale 2 Puffs by mouth every 6 hours as needed for Shortness of Breath.     • aspirin (ASA) 325 MG Tab Take 1 Tab by mouth every day. (Patient not taking: Reported on 12/12/2018) 100 Tab 0   • losartan (COZAAR) 25 MG Tab Take 25 mg by mouth every morning.     • amLODIPine (NORVASC) 5 MG Tab Take 5 mg by mouth every day.     • psyllium (METAMUCIL) 58.12 % Pack Take 1 Packet by mouth every day.       No facility-administered encounter medications on file as of 12/31/2018.      ROS     Objective:   /70 (BP Location: Left arm, Patient Position: Sitting, BP Cuff Size: Adult)   Pulse 64   Ht 1.702 m (5' 7\")   Wt 74.8 kg (165 lb)   SpO2 96%   BMI 25.84 kg/m²     Physical Exam   Constitutional: He appears well-developed and well-nourished.   Neck: No JVD present.   Cardiovascular: Normal rate and regular rhythm.    Murmur (2/6 diastolic left sternal border and radiating towards the apex) " heard.  Pulmonary/Chest: Effort normal and breath sounds normal. He has no rales.   Abdominal: Soft. There is no tenderness.   Musculoskeletal: He exhibits edema (3+ pretibial).     Lab Results   Component Value Date/Time    CHOLSTRLTOT 86 (L) 02/10/2018 03:40 AM    LDL 44 02/10/2018 03:40 AM    HDL 27 (A) 02/10/2018 03:40 AM    TRIGLYCERIDE 77 02/10/2018 03:40 AM       Lab Results   Component Value Date/Time    SODIUM 141 12/28/2018 02:15 PM    POTASSIUM 4.1 12/28/2018 02:15 PM    CHLORIDE 111 12/28/2018 02:15 PM    CO2 24 12/28/2018 02:15 PM    GLUCOSE 98 12/28/2018 02:15 PM    BUN 28 (H) 12/28/2018 02:15 PM    CREATININE 1.48 (H) 12/28/2018 02:15 PM     Lab Results   Component Value Date/Time    ALKPHOSPHAT 166 (H) 10/18/2018 01:48 PM    ASTSGOT 15 10/18/2018 01:48 PM    ALTSGPT 10 10/18/2018 01:48 PM    TBILIRUBIN 1.2 10/18/2018 01:48 PM      Lab Results   Component Value Date/Time    BNPBTYPENAT 174 (H) 12/28/2018 02:15 PM      Chest x-ray:  1.  Cardiomegaly with mild central vascular congestion and minimal perihilar interstitial prominence which could represent minor perihilar edema.    2.  No evidence of gross congestive failure.    3.  Left unipolar transvenous pacing device in place.   Reading Provider Reading Date   Bismark Tyler M.D. Dec 28, 2018       Transthoracic  Echo Report  CONCLUSIONS  Technically difficult and incomplete study.   Left ventricle is normal in size.  Normal left ventricular systolic function.  Left ventricular ejection fraction is visually estimated to be 70%.  Grossly normal regional wall motion.  Mild mitral regurgitation.  Severely dilated left atrium.  Moderately dilated right ventricle.  Normal right ventricular systolic function.  Vena cava is not well visualized.  Mild tricuspid regurgitation.  Estimated right ventricular systolic pressure  is 45-50 mmHg.  Compared to 11/2014, no significant change seen    JAI ROJAS  Exam Date:         09/05/2017       Assessment:      1. Persistent atrial fibrillation (HCC)     2. Essential hypertension     3. Localized edema     4. Elevated brain natriuretic peptide (BNP) level         Medical Decision Making:  Today's Assessment / Status / Plan:     Mr. Nunez is relatively stable.  His edema did not improve with discontinuance of amlodipine.  His blood pressure has been elevated at home but appears normal today.  I would like him to have his blood pressure device checked to ensure its accuracy before we start increasing his medical therapy.  He will try elevating his legs when he is lying down during the day and put on compression stockings in the morning.  His BNP is mildly elevated but may be normal for his age.  He does have some difficulty with daytime somnolence and we will obtain an overnight pulse oximetry to exclude nocturnal hypoxemia.  He will follow-up in a couple of months with an echocardiogram and repeat BMP.

## 2018-12-31 NOTE — TELEPHONE ENCOUNTER
OPO faxed to [facility]   Fax: [phone]  Phone: [phone]  Coordinated with son to help put on and take off monitor. Son Fabian given the name of the company. Company informed to contact Fabian to set up date.

## 2018-12-31 NOTE — LETTER
[facility]   [address] [place] [address] [address]  [place] [address]  Phone: [phone]  Fax: [phone]              Jie Nunez  9/26/1926    Encounter Date: 12/31/2018    Daniele Rebolledo M.D.          PROGRESS NOTE:  Chief Complaint   Patient presents with   • Atrial Fibrillation     Follow up   • Transient Ischemic Attack   • Hypertension       Subjective:   Jie Nunez is a 89 y.o. male who presents today  for follow-up with a history of persistent atrial fibrillation and hypertension.  He also has a permanent pacemaker in place.  He has been taken off of anticoagulation therapy because of falling.  He has been having difficulty with increase in edema.  We discontinued amlodipine at the time of his last visit.  He is undergone repeat evaluation with a chest x-ray and lab work.    He continues to have difficulty with edema which has not changed significantly.  He is noted no chest discomfort, dyspnea on exertion, PND, orthopnea, palpitations or lightheadedness.    His son relates that he has dyspnea on exertion at about a half a block using his walker.    Past Medical History:   Diagnosis Date   • Arrhythmia    • Atrial fibrillation (HCC)    • Cancer (HCC)     prostate   • Congestive heart failure (HCC)    • Hypertension      Past Surgical History:   Procedure Laterality Date   • CHOLECYSTECTOMY     • GYN SURGERY      leyda   • LUMBAR FUSION POSTERIOR     • OTHER CARDIAC SURGERY      A-fib   • OTHER CARDIAC SURGERY      chf   • OTHER ORTHOPEDIC SURGERY      lumbar lami   • SHOULDER ARTHROSCOPY       Family History   Problem Relation Age of Onset   • Heart Disease Neg Hx    • Heart Failure Neg Hx      Social History     Social History   • Marital status: Single     Spouse name: N/A   • Number of children: N/A   • Years of education: N/A     Occupational History   • Not on file.     Social History Main Topics   • Smoking status: Never Smoker   • Smokeless tobacco:  "Never Used   • Alcohol use No   • Drug use: No   • Sexual activity: Not on file     Other Topics Concern   • Not on file     Social History Narrative    ** Merged History Encounter **          Allergies   Allergen Reactions   • Beta Adrenergic Blockers Anaphylaxis     HR goes into teens, Cardiology wants him to never have another beta blocker   • Penicillins      Unknown reaction       Outpatient Encounter Prescriptions as of 12/31/2018   Medication Sig Dispense Refill   • ASPIRIN ADULT LOW STRENGTH 81 MG EC tablet Take 81 mg by mouth every day.  6   • ascorbic acid (ASCORBIC ACID) 500 MG Tab Take 500 mg by mouth every day.     • Psyllium (REGULOID PO) Take 8 oz by mouth every day.     • escitalopram (LEXAPRO) 10 MG Tab Take 10 mg by mouth every day.     • omeprazole (PRILOSEC) 20 MG delayed-release capsule Take 20 mg by mouth every day.     • atorvastatin (LIPITOR) 40 MG Tab Take 40 mg by mouth every evening.     • memantine (NAMENDA) 5 MG Tab Take 5 mg by mouth every day.     • tamsulosin (FLOMAX) 0.4 MG capsule Take 0.4 mg by mouth every evening.     • tiotropium (SPIRIVA) 18 MCG Cap Inhale 18 mcg by mouth every day.     • Cholecalciferol (VITAMIN D PO) Take 50,000 Units by mouth every Friday.     • albuterol (PROAIR HFA) 108 (90 Base) MCG/ACT Aero Soln inhalation aerosol Inhale 2 Puffs by mouth every 6 hours as needed for Shortness of Breath.     • aspirin (ASA) 325 MG Tab Take 1 Tab by mouth every day. (Patient not taking: Reported on 12/12/2018) 100 Tab 0   • losartan (COZAAR) 25 MG Tab Take 25 mg by mouth every morning.     • amLODIPine (NORVASC) 5 MG Tab Take 5 mg by mouth every day.     • psyllium (METAMUCIL) 58.12 % Pack Take 1 Packet by mouth every day.       No facility-administered encounter medications on file as of 12/31/2018.      ROS     Objective:   /70 (BP Location: Left arm, Patient Position: Sitting, BP Cuff Size: Adult)   Pulse 64   Ht 1.702 m (5' 7\")   Wt 74.8 kg (165 lb)   SpO2 96%  "  BMI 25.84 kg/m²      Physical Exam   Constitutional: He appears well-developed and well-nourished.   Neck: No JVD present.   Cardiovascular: Normal rate and regular rhythm.    Murmur (2/6 diastolic left sternal border and radiating towards the apex) heard.  Pulmonary/Chest: Effort normal and breath sounds normal. He has no rales.   Abdominal: Soft. There is no tenderness.   Musculoskeletal: He exhibits edema (3+ pretibial).     Lab Results   Component Value Date/Time    CHOLSTRLTOT 86 (L) 02/10/2018 03:40 AM    LDL 44 02/10/2018 03:40 AM    HDL 27 (A) 02/10/2018 03:40 AM    TRIGLYCERIDE 77 02/10/2018 03:40 AM       Lab Results   Component Value Date/Time    SODIUM 141 12/28/2018 02:15 PM    POTASSIUM 4.1 12/28/2018 02:15 PM    CHLORIDE 111 12/28/2018 02:15 PM    CO2 24 12/28/2018 02:15 PM    GLUCOSE 98 12/28/2018 02:15 PM    BUN 28 (H) 12/28/2018 02:15 PM    CREATININE 1.48 (H) 12/28/2018 02:15 PM     Lab Results   Component Value Date/Time    ALKPHOSPHAT 166 (H) 10/18/2018 01:48 PM    ASTSGOT 15 10/18/2018 01:48 PM    ALTSGPT 10 10/18/2018 01:48 PM    TBILIRUBIN 1.2 10/18/2018 01:48 PM      Lab Results   Component Value Date/Time    BNPBTYPENAT 174 (H) 12/28/2018 02:15 PM      Chest x-ray:  1.  Cardiomegaly with mild central vascular congestion and minimal perihilar interstitial prominence which could represent minor perihilar edema.    2.  No evidence of gross congestive failure.    3.  Left unipolar transvenous pacing device in place.   Reading Provider Reading Date   Bismark Tyler M.D. Dec 28, 2018       Transthoracic  Echo Report  CONCLUSIONS  Technically difficult and incomplete study.   Left ventricle is normal in size.  Normal left ventricular systolic function.  Left ventricular ejection fraction is visually estimated to be 70%.  Grossly normal regional wall motion.  Mild mitral regurgitation.  Severely dilated left atrium.  Moderately dilated right ventricle.  Normal right ventricular systolic  function.  Vena cava is not well visualized.  Mild tricuspid regurgitation.  Estimated right ventricular systolic pressure  is 45-50 mmHg.  Compared to 11/2014, no significant change seen    JAI NUNEZ  Exam Date:         09/05/2017       Assessment:     1. Persistent atrial fibrillation (HCC)     2. Essential hypertension     3. Localized edema     4. Elevated brain natriuretic peptide (BNP) level         Medical Decision Making:  Today's Assessment / Status / Plan:     Mr. Nunez is relatively stable.  His edema did not improve with discontinuance of amlodipine.  His blood pressure has been elevated at home but appears normal today.  I would like him to have his blood pressure device checked to ensure its accuracy before we start increasing his medical therapy.  He will try elevating his legs when he is lying down during the day and use compression stockings in the morning.  His BNP is mildly elevated but may be normal for his age.  He does have some difficulty with daytime somnolence and we will obtain an overnight pulse oximetry to exclude nocturnal hypoxemia.  He will follow-up in a couple of months with an echocardiogram and repeat BMP.  Monse Cervantes M.D.  [address] [id number]  [place] [address]  VIA Mail

## 2019-01-26 ENCOUNTER — HOSPITAL ENCOUNTER (INPATIENT)
Facility: MEDICAL CENTER | Age: 84
LOS: 2 days | DRG: 690 | End: 2019-01-28
Attending: EMERGENCY MEDICINE | Admitting: HOSPITALIST
Payer: MEDICARE

## 2019-01-26 ENCOUNTER — APPOINTMENT (OUTPATIENT)
Dept: RADIOLOGY | Facility: MEDICAL CENTER | Age: 84
DRG: 690 | End: 2019-01-26
Attending: EMERGENCY MEDICINE
Payer: MEDICARE

## 2019-01-26 PROBLEM — R41.0 ACUTE DELIRIUM: Status: ACTIVE | Noted: 2019-01-26

## 2019-01-26 PROBLEM — N39.0 COMPLICATED UTI (URINARY TRACT INFECTION): Status: ACTIVE | Noted: 2019-01-26

## 2019-01-26 PROBLEM — Z66 DNR (DO NOT RESUSCITATE): Status: ACTIVE | Noted: 2019-01-26

## 2019-01-26 PROBLEM — N17.9 ACUTE ON CHRONIC KIDNEY FAILURE (HCC): Status: ACTIVE | Noted: 2019-01-26

## 2019-01-26 PROBLEM — N18.9 ACUTE ON CHRONIC KIDNEY FAILURE (HCC): Status: ACTIVE | Noted: 2019-01-26

## 2019-01-26 LAB
ALBUMIN SERPL BCP-MCNC: 3.2 G/DL (ref 3.2–4.9)
ALBUMIN/GLOB SERPL: 0.9 G/DL
ALP SERPL-CCNC: 145 U/L (ref 30–99)
ALT SERPL-CCNC: 11 U/L (ref 2–50)
ANION GAP SERPL CALC-SCNC: 8 MMOL/L (ref 0–11.9)
AST SERPL-CCNC: 18 U/L (ref 12–45)
BASOPHILS # BLD AUTO: 0.7 % (ref 0–1.8)
BASOPHILS # BLD: 0.06 K/UL (ref 0–0.12)
BILIRUB SERPL-MCNC: 1.7 MG/DL (ref 0.1–1.5)
BUN SERPL-MCNC: 26 MG/DL (ref 8–22)
CALCIUM SERPL-MCNC: 8.6 MG/DL (ref 8.4–10.2)
CHLORIDE SERPL-SCNC: 100 MMOL/L (ref 96–112)
CO2 SERPL-SCNC: 26 MMOL/L (ref 20–33)
CREAT SERPL-MCNC: 1.83 MG/DL (ref 0.5–1.4)
EOSINOPHIL # BLD AUTO: 0.27 K/UL (ref 0–0.51)
EOSINOPHIL NFR BLD: 3.1 % (ref 0–6.9)
ERYTHROCYTE [DISTWIDTH] IN BLOOD BY AUTOMATED COUNT: 52.3 FL (ref 35.9–50)
GLOBULIN SER CALC-MCNC: 3.4 G/DL (ref 1.9–3.5)
GLUCOSE SERPL-MCNC: 117 MG/DL (ref 65–99)
HCT VFR BLD AUTO: 42.3 % (ref 42–52)
HGB BLD-MCNC: 14 G/DL (ref 14–18)
IMM GRANULOCYTES # BLD AUTO: 0.03 K/UL (ref 0–0.11)
IMM GRANULOCYTES NFR BLD AUTO: 0.3 % (ref 0–0.9)
LACTATE BLD-SCNC: 1.3 MMOL/L (ref 0.5–2)
LYMPHOCYTES # BLD AUTO: 0.85 K/UL (ref 1–4.8)
LYMPHOCYTES NFR BLD: 9.8 % (ref 22–41)
MCH RBC QN AUTO: 32.1 PG (ref 27–33)
MCHC RBC AUTO-ENTMCNC: 33.1 G/DL (ref 33.7–35.3)
MCV RBC AUTO: 97 FL (ref 81.4–97.8)
MONOCYTES # BLD AUTO: 0.62 K/UL (ref 0–0.85)
MONOCYTES NFR BLD AUTO: 7.1 % (ref 0–13.4)
NEUTROPHILS # BLD AUTO: 6.88 K/UL (ref 1.82–7.42)
NEUTROPHILS NFR BLD: 79 % (ref 44–72)
NRBC # BLD AUTO: 0 K/UL
NRBC BLD-RTO: 0 /100 WBC
PLATELET # BLD AUTO: 130 K/UL (ref 164–446)
PMV BLD AUTO: 10.6 FL (ref 9–12.9)
POTASSIUM SERPL-SCNC: 3.7 MMOL/L (ref 3.6–5.5)
PROT SERPL-MCNC: 6.6 G/DL (ref 6–8.2)
RBC # BLD AUTO: 4.36 M/UL (ref 4.7–6.1)
SODIUM SERPL-SCNC: 134 MMOL/L (ref 135–145)
TROPONIN I SERPL-MCNC: 0.03 NG/ML (ref 0–0.04)
WBC # BLD AUTO: 8.7 K/UL (ref 4.8–10.8)

## 2019-01-26 PROCEDURE — 700111 HCHG RX REV CODE 636 W/ 250 OVERRIDE (IP): Performed by: EMERGENCY MEDICINE

## 2019-01-26 PROCEDURE — 93005 ELECTROCARDIOGRAM TRACING: CPT | Performed by: EMERGENCY MEDICINE

## 2019-01-26 PROCEDURE — 87040 BLOOD CULTURE FOR BACTERIA: CPT

## 2019-01-26 PROCEDURE — 71045 X-RAY EXAM CHEST 1 VIEW: CPT

## 2019-01-26 PROCEDURE — 85025 COMPLETE CBC W/AUTO DIFF WBC: CPT

## 2019-01-26 PROCEDURE — 80053 COMPREHEN METABOLIC PANEL: CPT

## 2019-01-26 PROCEDURE — A9270 NON-COVERED ITEM OR SERVICE: HCPCS | Performed by: HOSPITALIST

## 2019-01-26 PROCEDURE — 83605 ASSAY OF LACTIC ACID: CPT

## 2019-01-26 PROCEDURE — 99285 EMERGENCY DEPT VISIT HI MDM: CPT

## 2019-01-26 PROCEDURE — 700102 HCHG RX REV CODE 250 W/ 637 OVERRIDE(OP): Performed by: HOSPITALIST

## 2019-01-26 PROCEDURE — 84484 ASSAY OF TROPONIN QUANT: CPT

## 2019-01-26 PROCEDURE — 99223 1ST HOSP IP/OBS HIGH 75: CPT | Mod: AI | Performed by: HOSPITALIST

## 2019-01-26 PROCEDURE — 96365 THER/PROPH/DIAG IV INF INIT: CPT

## 2019-01-26 PROCEDURE — 770006 HCHG ROOM/CARE - MED/SURG/GYN SEMI*

## 2019-01-26 PROCEDURE — 700105 HCHG RX REV CODE 258: Performed by: EMERGENCY MEDICINE

## 2019-01-26 RX ORDER — OMEPRAZOLE 20 MG/1
20 CAPSULE, DELAYED RELEASE ORAL DAILY
Status: DISCONTINUED | OUTPATIENT
Start: 2019-01-27 | End: 2019-01-28 | Stop reason: HOSPADM

## 2019-01-26 RX ORDER — SODIUM CHLORIDE 9 MG/ML
1000 INJECTION, SOLUTION INTRAVENOUS ONCE
Status: COMPLETED | OUTPATIENT
Start: 2019-01-26 | End: 2019-01-26

## 2019-01-26 RX ORDER — ESCITALOPRAM OXALATE 10 MG/1
10 TABLET ORAL DAILY
Status: DISCONTINUED | OUTPATIENT
Start: 2019-01-27 | End: 2019-01-28 | Stop reason: HOSPADM

## 2019-01-26 RX ORDER — ALUMINUM ZIRCONIUM OCTACHLOROHYDREX GLY 16 G/100G
4 GEL TOPICAL DAILY
COMMUNITY
End: 2019-07-31

## 2019-01-26 RX ORDER — TIOTROPIUM BROMIDE 18 UG/1
1 CAPSULE ORAL; RESPIRATORY (INHALATION) DAILY
Status: DISCONTINUED | OUTPATIENT
Start: 2019-01-27 | End: 2019-01-28 | Stop reason: HOSPADM

## 2019-01-26 RX ORDER — ATORVASTATIN CALCIUM 40 MG/1
40 TABLET, FILM COATED ORAL NIGHTLY
Status: DISCONTINUED | OUTPATIENT
Start: 2019-01-26 | End: 2019-01-28 | Stop reason: HOSPADM

## 2019-01-26 RX ORDER — TAMSULOSIN HYDROCHLORIDE 0.4 MG/1
0.4 CAPSULE ORAL EVERY EVENING
Status: DISCONTINUED | OUTPATIENT
Start: 2019-01-26 | End: 2019-01-28 | Stop reason: HOSPADM

## 2019-01-26 RX ORDER — ASCORBIC ACID 500 MG
500 TABLET ORAL DAILY
Status: DISCONTINUED | OUTPATIENT
Start: 2019-01-27 | End: 2019-01-28 | Stop reason: HOSPADM

## 2019-01-26 RX ORDER — LEVOFLOXACIN 500 MG/1
500 TABLET, FILM COATED ORAL DAILY
Status: ON HOLD | COMMUNITY
Start: 2019-01-23 | End: 2019-01-28

## 2019-01-26 RX ORDER — HYDROCHLOROTHIAZIDE 25 MG/1
25 TABLET ORAL DAILY
COMMUNITY
End: 2019-07-31

## 2019-01-26 RX ORDER — MEMANTINE HYDROCHLORIDE 10 MG/1
5 TABLET ORAL DAILY
Status: DISCONTINUED | OUTPATIENT
Start: 2019-01-27 | End: 2019-01-28 | Stop reason: HOSPADM

## 2019-01-26 RX ORDER — LOSARTAN POTASSIUM 25 MG/1
25 TABLET ORAL EVERY MORNING
Status: DISCONTINUED | OUTPATIENT
Start: 2019-01-27 | End: 2019-01-28 | Stop reason: HOSPADM

## 2019-01-26 RX ADMIN — TAMSULOSIN HYDROCHLORIDE 0.4 MG: 0.4 CAPSULE ORAL at 20:34

## 2019-01-26 RX ADMIN — MEROPENEM 500 MG: 500 INJECTION, POWDER, FOR SOLUTION INTRAVENOUS at 16:06

## 2019-01-26 RX ADMIN — ATORVASTATIN CALCIUM 40 MG: 40 TABLET, FILM COATED ORAL at 20:34

## 2019-01-26 RX ADMIN — SODIUM CHLORIDE 1000 ML: 9 INJECTION, SOLUTION INTRAVENOUS at 15:09

## 2019-01-26 ASSESSMENT — COGNITIVE AND FUNCTIONAL STATUS - GENERAL
TURNING FROM BACK TO SIDE WHILE IN FLAT BAD: A LITTLE
DRESSING REGULAR LOWER BODY CLOTHING: A LITTLE
MOVING FROM LYING ON BACK TO SITTING ON SIDE OF FLAT BED: A LITTLE
PERSONAL GROOMING: A LITTLE
STANDING UP FROM CHAIR USING ARMS: A LOT
TOILETING: A LOT
HELP NEEDED FOR BATHING: A LITTLE
WALKING IN HOSPITAL ROOM: A LOT
DRESSING REGULAR UPPER BODY CLOTHING: A LITTLE
SUGGESTED CMS G CODE MODIFIER MOBILITY: CK
DAILY ACTIVITIY SCORE: 18
MOVING TO AND FROM BED TO CHAIR: A LITTLE
SUGGESTED CMS G CODE MODIFIER DAILY ACTIVITY: CK
MOBILITY SCORE: 15
CLIMB 3 TO 5 STEPS WITH RAILING: A LOT

## 2019-01-26 ASSESSMENT — PATIENT HEALTH QUESTIONNAIRE - PHQ9
1. LITTLE INTEREST OR PLEASURE IN DOING THINGS: NOT AT ALL
2. FEELING DOWN, DEPRESSED, IRRITABLE, OR HOPELESS: NOT AT ALL
SUM OF ALL RESPONSES TO PHQ9 QUESTIONS 1 AND 2: 0

## 2019-01-26 ASSESSMENT — LIFESTYLE VARIABLES
EVER_SMOKED: YES
ALCOHOL_USE: NO

## 2019-01-26 ASSESSMENT — COPD QUESTIONNAIRES
IN THE PAST 12 MONTHS DO YOU DO LESS THAN YOU USED TO BECAUSE OF YOUR BREATHING PROBLEMS: DISAGREE/UNSURE
HAVE YOU SMOKED AT LEAST 100 CIGARETTES IN YOUR ENTIRE LIFE: YES
DO YOU EVER COUGH UP ANY MUCUS OR PHLEGM?: NO/ONLY WITH OCCASIONAL COLDS OR INFECTIONS
DURING THE PAST 4 WEEKS HOW MUCH DID YOU FEEL SHORT OF BREATH: NONE/LITTLE OF THE TIME
COPD SCREENING SCORE: 4

## 2019-01-26 NOTE — ED TRIAGE NOTES
"Chief Complaint   Patient presents with   • ALOC     per family member, pt seemed more confused today     BP (!) 161/76   Pulse 67   Temp 36.4 °C (97.5 °F) (Temporal)   Resp 14   Ht 1.702 m (5' 7\")   Wt 68.2 kg (150 lb 7.4 oz)   SpO2 96%   BMI 23.57 kg/m²     Pt BIB [facility] from [facility]   "

## 2019-01-26 NOTE — ED NOTES
Med rec completed per pt's MAR  Allergies reviewed    Pt started a 7 day course of Levofloxacin on 1/23/19

## 2019-01-27 ENCOUNTER — APPOINTMENT (OUTPATIENT)
Dept: RADIOLOGY | Facility: MEDICAL CENTER | Age: 84
DRG: 690 | End: 2019-01-27
Attending: HOSPITALIST
Payer: MEDICARE

## 2019-01-27 PROBLEM — I27.20 PULMONARY HYPERTENSION (HCC): Status: ACTIVE | Noted: 2019-01-27

## 2019-01-27 LAB
ALBUMIN SERPL BCP-MCNC: 3.1 G/DL (ref 3.2–4.9)
ALBUMIN/GLOB SERPL: 1 G/DL
ALP SERPL-CCNC: 153 U/L (ref 30–99)
ALT SERPL-CCNC: 12 U/L (ref 2–50)
ANION GAP SERPL CALC-SCNC: 10 MMOL/L (ref 0–11.9)
APPEARANCE UR: CLEAR
AST SERPL-CCNC: 25 U/L (ref 12–45)
BACTERIA #/AREA URNS HPF: ABNORMAL /HPF
BASOPHILS # BLD AUTO: 0.7 % (ref 0–1.8)
BASOPHILS # BLD: 0.06 K/UL (ref 0–0.12)
BILIRUB SERPL-MCNC: 2.3 MG/DL (ref 0.1–1.5)
BILIRUB UR QL STRIP.AUTO: NEGATIVE
BUN SERPL-MCNC: 23 MG/DL (ref 8–22)
CALCIUM SERPL-MCNC: 8.7 MG/DL (ref 8.4–10.2)
CASTS URNS QL MICRO: ABNORMAL /LPF
CHLORIDE SERPL-SCNC: 100 MMOL/L (ref 96–112)
CO2 SERPL-SCNC: 24 MMOL/L (ref 20–33)
COLOR UR: YELLOW
CREAT SERPL-MCNC: 1.64 MG/DL (ref 0.5–1.4)
EKG IMPRESSION: NORMAL
EOSINOPHIL # BLD AUTO: 0.3 K/UL (ref 0–0.51)
EOSINOPHIL NFR BLD: 3.3 % (ref 0–6.9)
EPI CELLS #/AREA URNS HPF: ABNORMAL /HPF
ERYTHROCYTE [DISTWIDTH] IN BLOOD BY AUTOMATED COUNT: 50.6 FL (ref 35.9–50)
GLOBULIN SER CALC-MCNC: 3.1 G/DL (ref 1.9–3.5)
GLUCOSE SERPL-MCNC: 92 MG/DL (ref 65–99)
GLUCOSE UR STRIP.AUTO-MCNC: NEGATIVE MG/DL
HCT VFR BLD AUTO: 42.7 % (ref 42–52)
HGB BLD-MCNC: 14.1 G/DL (ref 14–18)
IMM GRANULOCYTES # BLD AUTO: 0.05 K/UL (ref 0–0.11)
IMM GRANULOCYTES NFR BLD AUTO: 0.5 % (ref 0–0.9)
KETONES UR STRIP.AUTO-MCNC: NEGATIVE MG/DL
LEUKOCYTE ESTERASE UR QL STRIP.AUTO: NEGATIVE
LYMPHOCYTES # BLD AUTO: 0.93 K/UL (ref 1–4.8)
LYMPHOCYTES NFR BLD: 10.2 % (ref 22–41)
MCH RBC QN AUTO: 31.8 PG (ref 27–33)
MCHC RBC AUTO-ENTMCNC: 33 G/DL (ref 33.7–35.3)
MCV RBC AUTO: 96.2 FL (ref 81.4–97.8)
MICRO URNS: ABNORMAL
MONOCYTES # BLD AUTO: 0.71 K/UL (ref 0–0.85)
MONOCYTES NFR BLD AUTO: 7.8 % (ref 0–13.4)
NEUTROPHILS # BLD AUTO: 7.11 K/UL (ref 1.82–7.42)
NEUTROPHILS NFR BLD: 77.5 % (ref 44–72)
NITRITE UR QL STRIP.AUTO: NEGATIVE
NRBC # BLD AUTO: 0 K/UL
NRBC BLD-RTO: 0 /100 WBC
PH UR STRIP.AUTO: 7 [PH]
PLATELET # BLD AUTO: 128 K/UL (ref 164–446)
PMV BLD AUTO: 11.1 FL (ref 9–12.9)
POTASSIUM SERPL-SCNC: 3.9 MMOL/L (ref 3.6–5.5)
PROT SERPL-MCNC: 6.2 G/DL (ref 6–8.2)
PROT UR QL STRIP: 100 MG/DL
RBC # BLD AUTO: 4.44 M/UL (ref 4.7–6.1)
RBC # URNS HPF: ABNORMAL /HPF
RBC UR QL AUTO: NEGATIVE
SODIUM SERPL-SCNC: 134 MMOL/L (ref 135–145)
SP GR UR STRIP.AUTO: 1.02
WBC # BLD AUTO: 9.2 K/UL (ref 4.8–10.8)
WBC #/AREA URNS HPF: ABNORMAL /HPF

## 2019-01-27 PROCEDURE — A9270 NON-COVERED ITEM OR SERVICE: HCPCS | Performed by: HOSPITALIST

## 2019-01-27 PROCEDURE — 700111 HCHG RX REV CODE 636 W/ 250 OVERRIDE (IP): Performed by: HOSPITALIST

## 2019-01-27 PROCEDURE — 700102 HCHG RX REV CODE 250 W/ 637 OVERRIDE(OP): Performed by: HOSPITALIST

## 2019-01-27 PROCEDURE — 97161 PT EVAL LOW COMPLEX 20 MIN: CPT

## 2019-01-27 PROCEDURE — 99232 SBSQ HOSP IP/OBS MODERATE 35: CPT | Performed by: HOSPITALIST

## 2019-01-27 PROCEDURE — 85025 COMPLETE CBC W/AUTO DIFF WBC: CPT

## 2019-01-27 PROCEDURE — 700102 HCHG RX REV CODE 250 W/ 637 OVERRIDE(OP): Performed by: INTERNAL MEDICINE

## 2019-01-27 PROCEDURE — 700105 HCHG RX REV CODE 258: Performed by: HOSPITALIST

## 2019-01-27 PROCEDURE — 81001 URINALYSIS AUTO W/SCOPE: CPT

## 2019-01-27 PROCEDURE — 99221 1ST HOSP IP/OBS SF/LOW 40: CPT | Performed by: INTERNAL MEDICINE

## 2019-01-27 PROCEDURE — A9270 NON-COVERED ITEM OR SERVICE: HCPCS | Performed by: INTERNAL MEDICINE

## 2019-01-27 PROCEDURE — 770020 HCHG ROOM/CARE - TELE (206)

## 2019-01-27 PROCEDURE — 76775 US EXAM ABDO BACK WALL LIM: CPT

## 2019-01-27 PROCEDURE — 80053 COMPREHEN METABOLIC PANEL: CPT

## 2019-01-27 RX ORDER — LINEZOLID 600 MG/1
600 TABLET, FILM COATED ORAL EVERY 12 HOURS
Status: DISCONTINUED | OUTPATIENT
Start: 2019-01-27 | End: 2019-01-28 | Stop reason: HOSPADM

## 2019-01-27 RX ORDER — HEPARIN SODIUM 5000 [USP'U]/ML
5000 INJECTION, SOLUTION INTRAVENOUS; SUBCUTANEOUS EVERY 8 HOURS
Status: DISCONTINUED | OUTPATIENT
Start: 2019-01-27 | End: 2019-01-28 | Stop reason: HOSPADM

## 2019-01-27 RX ADMIN — OMEPRAZOLE 20 MG: 20 CAPSULE, DELAYED RELEASE ORAL at 05:07

## 2019-01-27 RX ADMIN — ATORVASTATIN CALCIUM 40 MG: 40 TABLET, FILM COATED ORAL at 20:54

## 2019-01-27 RX ADMIN — LINEZOLID 600 MG: 600 TABLET, FILM COATED ORAL at 20:54

## 2019-01-27 RX ADMIN — LINEZOLID 600 MG: 600 TABLET, FILM COATED ORAL at 11:42

## 2019-01-27 RX ADMIN — LOSARTAN POTASSIUM 25 MG: 25 TABLET, FILM COATED ORAL at 05:08

## 2019-01-27 RX ADMIN — ESCITALOPRAM OXALATE 10 MG: 10 TABLET ORAL at 05:07

## 2019-01-27 RX ADMIN — ASPIRIN 81 MG: 81 TABLET, COATED ORAL at 05:08

## 2019-01-27 RX ADMIN — MEROPENEM 500 MG: 500 INJECTION, POWDER, FOR SOLUTION INTRAVENOUS at 05:09

## 2019-01-27 RX ADMIN — OXYCODONE HYDROCHLORIDE AND ACETAMINOPHEN 500 MG: 500 TABLET ORAL at 05:08

## 2019-01-27 RX ADMIN — MEMANTINE HYDROCHLORIDE 5 MG: 10 TABLET ORAL at 05:08

## 2019-01-27 RX ADMIN — HEPARIN SODIUM 5000 UNITS: 5000 INJECTION, SOLUTION INTRAVENOUS; SUBCUTANEOUS at 20:55

## 2019-01-27 RX ADMIN — TAMSULOSIN HYDROCHLORIDE 0.4 MG: 0.4 CAPSULE ORAL at 17:16

## 2019-01-27 ASSESSMENT — ENCOUNTER SYMPTOMS
VOMITING: 0
EYE PAIN: 0
SHORTNESS OF BREATH: 0
DEPRESSION: 0
DIZZINESS: 0
TINGLING: 0
HEADACHES: 0
ABDOMINAL PAIN: 0
FEVER: 0
CHILLS: 0
PALPITATIONS: 0
COUGH: 0
BLURRED VISION: 0
NECK PAIN: 0
NAUSEA: 0
BACK PAIN: 0
INSOMNIA: 0
SORE THROAT: 0

## 2019-01-27 ASSESSMENT — GAIT ASSESSMENTS
DISTANCE (FEET): 150
DEVIATION: SHUFFLED GAIT
GAIT LEVEL OF ASSIST: STAND BY ASSIST
ASSISTIVE DEVICE: FRONT WHEEL WALKER

## 2019-01-27 ASSESSMENT — COGNITIVE AND FUNCTIONAL STATUS - GENERAL
CLIMB 3 TO 5 STEPS WITH RAILING: A LOT
MOBILITY SCORE: 17
STANDING UP FROM CHAIR USING ARMS: A LITTLE
WALKING IN HOSPITAL ROOM: A LITTLE
MOVING TO AND FROM BED TO CHAIR: A LITTLE
SUGGESTED CMS G CODE MODIFIER MOBILITY: CK
TURNING FROM BACK TO SIDE WHILE IN FLAT BAD: A LITTLE
MOVING FROM LYING ON BACK TO SITTING ON SIDE OF FLAT BED: A LITTLE

## 2019-01-27 NOTE — CONSULTS
"DATE OF SERVICE:  01/27/2019    INFECTIOUS DISEASE CONSULTATION    REASON FOR CONSULT:  UTI.    CONSULTING PHYSICIAN:  Fred Wright MD    HISTORY OF PRESENT ILLNESS:  Please note majority of the history is obtained   from the chart as patient is currently confused and not sure why admitted to   the hospital, but this is a 89-year-old white male who was originally admitted   on 01/26/2019 after failure of treatment as an outpatient for urinary tract   infection.  He apparently lives in a memory care unit and they had noticed   some increasing confusion, so his urine was checked.  He was found to have a   positive urine culture for Enterococcus and he was started on levofloxacin.    These results are currently not available.  His altered mental status   persisted, so he was sent to the hospital for further evaluation and   management.  Since admission, he is noted to be confused, oriented to self   only.  He has no fever or leukocytosis.  He denies any pain.  When asked a   question, he usually answers, \"I don't think so\".   He is currently receiving meropenem and infectious disease is consulted for   antibiotic recommendations and management.    REVIEW OF SYSTEMS:  Unobtainable due to patient's underlying dementia.  He has   no documented fever since admission.    ALLERGIES:  HE IS INTOLERANT TO BETA-BLOCKERS.  HE HAS UNKNOWN REACTION TO   PENICILLIN and patient is unable to describe his prior reaction.    PAST MEDICAL HISTORY:  Atrial fibrillation, congestive heart failure,   hypertension, dementia.    FAMILY HISTORY:  Unknown.    SOCIAL HISTORY:  Nonsmoker, does not drink or use illicit drugs.    MEDICATIONS:  Review of his prior medications show he does probably have   hyperlipidemia, he is on Lipitor; hypertension, he is on Cozaar and Flomax, so   he may have BPH.    PHYSICAL EXAMINATION:  GENERAL:  He is an elderly, frail male in no acute distress, pleasant and   cooperative, sitting up to a chair.  VITAL " SIGNS:  He is afebrile.  Temperature is 97.6, blood pressure 140/119,   pulse 61, respirations 18, oxygen saturation 97% on room air.  He weighs 61   kg.  HEENT:  Normocephalic, atraumatic.  Pupils are reactive.  NECK:  Supple.  CARDIOVASCULAR:  Irregular rate and rhythm.  CHEST:  Clear to auscultation bilaterally, unlabored.  ABDOMEN:  Soft, nontender.  There is no rebound or guarding.  There is no   flank pain.  EXTREMITIES:  Show no cyanosis or clubbing.  NEUROLOGIC:  He is awake, alert, does follow commands, but is unable to   respond appropriately to questions.    CURRENT LABORATORY DATA:  White blood cell count of 9.2, H and H of 14.1 and   42.7, platelets of 128.  Sodium 134, potassium 3.9, chloride 100, bicarbonate   24, BUN 23, creatinine 1.64, it was 1.83 on admission.  AST 25, ALT 12,   alkaline phosphatase 153, total bilirubin 2.3.  Urinalysis on this admission   showed 0-2 wbc's.  Blood cultures x2 are negative.    IMAGING:  He did have a chest x-ray, which showed no pneumonia, but probably   some atelectasis and vascular congestion.    ASSESSMENT AND PLAN:  A 89-year-old male with dementia and atrial   fibrillation, admitted due to persistent altered mental status after a   presumed failure treatment of urinary tract infection.  He is currently afebrile and without   leukocytosis.  His admitting urinalysis showed 0 wbc's.  It would be helpful   to get his outpatient UA and urine culture result.  We would not expect   levofloxacin to be effective in treating Enterococcus and it may have   contributed to his confusion.  We will switch him to Zyvox pending receipt of   his outpatient urine culture result in case he had vancomycin-resistant   Enterococcus.  Anticipate a short course given his current normal urinalysis   findings.  We will need to monitor his platelets closely as he does have   thrombocytopenia.  His altered mental status is likely multifactorial   including presumed recent infection,  medications, and underlying dementia.    For his acute on chronic renal insufficiency, we will avoid nephrotoxic   agents.  It has improved with hydration.  Continue to monitor.  Further   recommendation pending culture results and clinical course.  Discussed with   internal medicine.    Thank you and we will follow with you.       ____________________________________     MD TERESITA OLIVARES / ESTEPHANIA    DD:  01/27/2019 14:10:17  DT:  01/27/2019 15:05:32    D#:  [id number]  Job#:  [id number]

## 2019-01-27 NOTE — ASSESSMENT & PLAN NOTE
Secondary to enterococcus most likely resistant organism, failed outpatient treatment with levofloxacin.  Awaiting culture report from [facility].  ID consulted given hospitalized for this now and less common bacteria.   Continue abx per ID recs today.

## 2019-01-27 NOTE — PROGRESS NOTES
· 2 RN skin check complete with ANA Dunbar.  · Devices in place Glasses.  · Skin assessed under devices yes.  · Confirmed pressure ulcers found on N/A.  · New potential pressure ulcers noted on N/A. Wound consult placed and wound reported.  · The following interventions in place patient turns self, mobilization with assistance from staff encouraged    2+ edema noted on BLE, baseline per patient. Feet are calloused and dry. BLE exhibit duskiness/pallor.  Bruise noted to left elbow, patient unsure of how he acquired it.  Mole with scabbing present on patient's upper back.

## 2019-01-27 NOTE — PROGRESS NOTES
Assessment completed. Pt AAO to self and place. Does not know month/year or why he's here. Pt easily reoriented, very pleasant, follows commands and answers questions appropriately. Denies pain or other complaints. Pt EOB to use urinal, will send urine per order. IVF infusing. No other needs at this time, bed alarm on, call light in reach.

## 2019-01-27 NOTE — ASSESSMENT & PLAN NOTE
Due to volume depletion secondary to urinary tract infection.  Normal saline infusion, daily BMP, avoid nephrotoxins.  Stage 3 baseline renal failure.

## 2019-01-27 NOTE — PROGRESS NOTES
Hospital Medicine Daily Progress Note    Date of Service  1/27/2019    Chief Complaint  89 y.o. male admitted 1/26/2019 with Increasing confusion and a recent UTI.    Hospital Course    He was admitted with acute delirium and his urine culture was noted to be positive for enterococcus. He had been treated with levaquin for his uti at his memory care facility.       Interval Problem Update  I ordered labs stat this am.     I reviewed his cxr today:  1.  Mildly enlarged cardiac silhouette with central vascular congestion.  2.  Perihilar linear opacities are likely due to atelectasis.  3.  Right paratracheal fullness may be due to vascular prominence.    I reviewed his previous echo today:  Technically difficult and incomplete study.   Left ventricle is normal in size.  Normal left ventricular systolic function.  Left ventricular ejection fraction is visually estimated to be 70%.  Grossly normal regional wall motion.  Mild mitral regurgitation.  Severely dilated left atrium.  Moderately dilated right ventricle.  Normal right ventricular systolic function.  Vena cava is not well visualized.  Mild tricuspid regurgitation.  Estimated right ventricular systolic pressure  is 45-50 mmHg.  Compared to 11/2014, no significant change seen      Consultants/Specialty  I consulted ID today    Code Status  DNR    Disposition  Back to [facility] once improved likely.     Review of Systems  Review of Systems   Constitutional: Negative for chills and fever.   HENT: Negative for sore throat.    Eyes: Negative for blurred vision and pain.   Respiratory: Negative for cough and shortness of breath.    Cardiovascular: Negative for chest pain and palpitations.   Gastrointestinal: Negative for abdominal pain, nausea and vomiting.   Genitourinary: Negative for dysuria and urgency.   Musculoskeletal: Negative for back pain and neck pain.   Skin: Negative for itching and rash.   Neurological: Negative for dizziness, tingling and headaches.    Psychiatric/Behavioral: Negative for depression. The patient does not have insomnia.    All other systems reviewed and are negative.       Physical Exam  Temp:  [36.3 °C (97.4 °F)-36.4 °C (97.6 °F)] 36.4 °C (97.6 °F)  Pulse:  [60-67] 61  Resp:  [7-30] 18  BP: (140-161)/() 140/119  SpO2:  [79 %-99 %] 97 %    Physical Exam   Constitutional: He is oriented to person, place, and time. He appears well-developed and well-nourished. No distress.   Patient seen and examined  Plan discussed with RN   AMILCART:   Right Ear: External ear normal.   Left Ear: External ear normal.   Nose: Nose normal.   Eyes: Right eye exhibits no discharge. Left eye exhibits no discharge. No scleral icterus.   Neck: No JVD present. No tracheal deviation present.   Cardiovascular: Normal rate, normal heart sounds and intact distal pulses.    No murmur heard.  Cap refill 2sec  Pulses 2+ throughout     Pulmonary/Chest: Effort normal and breath sounds normal. No respiratory distress. He has no wheezes. He has no rales.   Abdominal: Soft. Bowel sounds are normal. He exhibits no distension. There is no tenderness. There is no guarding.   Musculoskeletal: He exhibits no edema or tenderness.   Neurological: He is alert and oriented to person, place, and time.   Skin: Skin is warm and dry. He is not diaphoretic. No erythema.   Normal skin color   Psychiatric: He has a normal mood and affect. His behavior is normal.   Nursing note and vitals reviewed.      Fluids    Intake/Output Summary (Last 24 hours) at 01/27/19 0920  Last data filed at 01/27/19 0900   Gross per 24 hour   Intake              100 ml   Output              300 ml   Net             -200 ml       Laboratory  Recent Labs      01/26/19   1500   WBC  8.7   RBC  4.36*   HEMOGLOBIN  14.0   HEMATOCRIT  42.3   MCV  97.0   MCH  32.1   MCHC  33.1*   RDW  52.3*   PLATELETCT  130*   MPV  10.6     Recent Labs      01/26/19   1500   SODIUM  134*   POTASSIUM  3.7   CHLORIDE  100   CO2  26   GLUCOSE   117*   BUN  26*   CREATININE  1.83*   CALCIUM  8.6                   Imaging  US-RENAL   Final Result      1.  There is bilateral renal cortical thinning consistent with medical renal disease.   2.  There is a small right renal cyst.      DX-CHEST-PORTABLE (1 VIEW)   Final Result      1.  Mildly enlarged cardiac silhouette with central vascular congestion.   2.  Perihilar linear opacities are likely due to atelectasis.   3.  Right paratracheal fullness may be due to vascular prominence.           Assessment/Plan  Complicated UTI (urinary tract infection)- (present on admission)   Assessment & Plan    Secondary to enterococcus most likely resistant organism, failed outpatient treatment with levofloxacin.  [facility].  ID consulted given hospitalized for this now and less common bacteria.   Continue abx per ID recs today.      Dementia- (present on admission)   Assessment & Plan    Continue escitalopram and memantine.  Patient lives in a memory care unit.     Pulmonary hypertension (HCC)   Assessment & Plan    Noted on echo review. Not on o2. Watch for fluid overload close.      Acute delirium   Assessment & Plan    Secondary to urinary tract infection, mentation is slightly worse than baseline of dementia.  Also possible 2/2 levaquin  Off this med.   Frequent reorientation.  Avoid sedative medications as able.  Treat urinary tract infection.     DNR (do not resuscitate)- (present on admission)   Assessment & Plan    Confirmed with the patient's son Dr. Jozef Nunez on admission. POLST on file.     Acute on chronic kidney failure (HCC)- (present on admission)   Assessment & Plan    Due to volume depletion secondary to urinary tract infection.  Normal saline infusion, daily BMP, avoid nephrotoxins.  Stage 3 baseline renal failure.      Hyponatremia- (present on admission)   Assessment & Plan    Hypovolemic likely. Trend with iv fluids.      GERD (gastroesophageal reflux  disease)- (present on admission)   Assessment & Plan    Continue omeprazole 20 mg daily.     Essential hypertension- (present on admission)   Assessment & Plan    Continue losartan.     Persistent atrial fibrillation (HCC)- (present on admission)   Assessment & Plan    Start tele monitoring. I Reviewed his ekg- paced in afib. High risk for RVR with infection  Correct lytes  Volume repletion  On asa- not a great full anticoagulation candidate.             VTE prophylaxis: benita

## 2019-01-27 NOTE — PROGRESS NOTES
Telephone report received from Olga PEREZ.  Patient arrives to [id number], slide board used to transfer patient to bed. Incontinent, gown and linens changed.

## 2019-01-27 NOTE — PROGRESS NOTES
Pt up with PT, ambulated up to bathroom and in hallway with FWW, x1/SBA. Tolerated well. Pt up to cardiac chair for breakfast. Urine sample sent to lab. Pt states no other needs at this time. Call light in reach.

## 2019-01-27 NOTE — CARE PLAN
Problem: Infection  Goal: Will remain free from infection  Outcome: PROGRESSING AS EXPECTED  Pt afebrile, WBC WNL. Pt on IV Merrem. Possible ID consult today. Pt confused, unsure of how far off from baseline mentation. Standard precautions and hand hygiene utilized.     Problem: Urinary Elimination:  Goal: Ability to reestablish a normal urinary elimination pattern will improve  Outcome: PROGRESSING AS EXPECTED  Pt incontinent overnight. Able to use urinal this AM with assistance. Pt on IV ABX for unresolved UTI. Pt provided with frequent toileting opportunities, brief in place for incontinent episodes.

## 2019-01-27 NOTE — PROGRESS NOTES
Pt sitting up in cardiac chair for lunch. Son, Jozef, at bedside. Pt reports no needs at this time. Tolerating lunch well, fair appetite. Per son, pt is much improved from previous few days.

## 2019-01-27 NOTE — PROGRESS NOTES
Bedside report received from Yanelis PEREZ. Assumed care. POC discussed. Pt resting comfortably in bed. Safety precautions in place.

## 2019-01-27 NOTE — ED PROVIDER NOTES
ED Provider Note    CHIEF COMPLAINT  Chief Complaint   Patient presents with   • ALOC     per family member, pt seemed more confused today       HPI  Jie Nunez is a 89 y.o. male who presents to the emergency department due to a change in behavior and recent history of urinary tract infection.  The patient has a history of dementia and cannot provide a history himself however I have spoken with his son and the patient was diagnosed with a urinary tract infection for 5 days ago and reportedly cultures have grown out enterococcus.  Unfortunately the urine studies were not done at this laboratory and I do not have results.  The patient was started on levofloxacin and has been taking it for the last 4 days but his son noticed today that he seems a lot more tired and confused than usual so he was brought to the emergency department for evaluation.    REVIEW OF SYSTEMS no report of pain or difficulty breathing vomiting, all other systems negative    PAST MEDICAL HISTORY  Past Medical History:   Diagnosis Date   • Arrhythmia    • Atrial fibrillation (HCC)    • Cancer (HCC)     prostate   • Congestive heart failure (HCC)    • Hypertension        FAMILY HISTORY  Family History   Problem Relation Age of Onset   • Heart Disease Neg Hx    • Heart Failure Neg Hx        SOCIAL HISTORY  Social History     Social History   • Marital status: Single     Spouse name: N/A   • Number of children: N/A   • Years of education: N/A     Social History Main Topics   • Smoking status: Never Smoker   • Smokeless tobacco: Never Used   • Alcohol use No   • Drug use: No   • Sexual activity: Not on file     Other Topics Concern   • Not on file     Social History Narrative    ** Merged History Encounter **            SURGICAL HISTORY  Past Surgical History:   Procedure Laterality Date   • CHOLECYSTECTOMY     • GYN SURGERY      leyda   • LUMBAR FUSION POSTERIOR     • OTHER CARDIAC SURGERY      A-fib   • OTHER CARDIAC SURGERY      chf   • OTHER  "ORTHOPEDIC SURGERY      lumbar lami   • SHOULDER ARTHROSCOPY         CURRENT MEDICATIONS  Home Medications     Reviewed by Denita Williamson (Pharmacy Tech) on 01/26/19 at 1438  Med List Status: Complete   Medication Last Dose Status   albuterol (PROAIR HFA) 108 (90 Base) MCG/ACT Aero Soln inhalation aerosol PRN Active   ascorbic acid (ASCORBIC ACID) 500 MG Tab 1/26/2019 Active   aspirin EC (ECOTRIN) 81 MG Tablet Delayed Response 1/26/2019 Active   atorvastatin (LIPITOR) 40 MG Tab 1/25/2019 Active   Cholecalciferol (VITAMIN D PO) 1/25/2019 Active   escitalopram (LEXAPRO) 10 MG Tab 1/26/2019 Active   hydroCHLOROthiazide (HYDRODIURIL) 25 MG Tab 1/26/2019 Active   levoFLOXacin (LEVAQUIN) 500 MG tablet 1/26/2019 Active   losartan (COZAAR) 25 MG Tab 1/26/2019 Active   memantine (NAMENDA) 5 MG Tab 1/25/2019 Active   omeprazole (PRILOSEC) 20 MG delayed-release capsule 1/26/2019 Active   Probiotic Product (ALIGN) 4 MG Cap 1/26/2019 Active   Psyllium (REGULOID PO) 1/26/2019 Active   tamsulosin (FLOMAX) 0.4 MG capsule 1/25/2019 Active   tiotropium (SPIRIVA) 18 MCG Cap 1/26/2019 Active                ALLERGIES  Allergies   Allergen Reactions   • Beta Adrenergic Blockers Anaphylaxis     HR goes into teens, Cardiology wants him to never have another beta blocker   • Penicillins      Unknown reaction         PHYSICAL EXAM  VITAL SIGNS: BP (!) 161/76   Pulse 60   Temp 36.4 °C (97.5 °F) (Temporal)   Resp (!) 29   Ht 1.702 m (5' 7\")   Wt 68.2 kg (150 lb 7.4 oz)   SpO2 99%   BMI 23.57 kg/m²    Oxygen saturation is interpreted as adequate  Constitutional: Awake and he does not appear toxic or distressed  HENT: Mucous membranes are dry  Eyes: No erythema discharge or jaundice  Neck: Trachea midline no JVD  Cardiovascular: Regular rate and rhythm  Lungs: Somewhat diminished in the lung bases but no apparent difficulty breathing  Abdomen/Back: Soft nondistended nontender no peritoneal findings bowel sounds are present  Skin: Warm " and dry  Musculoskeletal: No acute bony deformity  Neurologic: Awake verbal moving all extremities but confused with a history of dementia    Laboratory  CBC shows white blood cell count of 8.7 hemoglobin is adequate at 14 basic metabolic panel shows an elevated creatinine of 1.83 with a GFR of 35 and for comparison on December 28, 2018 the creatinine was 1.48 and the GFR was 44.  The alk phos today is minimally elevated at 145 total bilirubin minimally elevated at 1.7 bedside lactic acid level is normal at 1.3 troponin is normal at 0.03.  Blood cultures were sent to the lab and urinalysis has been ordered and pending    EKG interpretation  Twelve-lead EKG showed a regular paced rhythm with no pathologic ST elevation    Radiology  DX-CHEST-PORTABLE (1 VIEW)   Final Result      1.  Mildly enlarged cardiac silhouette with central vascular congestion.   2.  Perihilar linear opacities are likely due to atelectasis.   3.  Right paratracheal fullness may be due to vascular prominence.            MEDICAL DECISION MAKING and DISPOSITION  In the emergency department an IV was established and the patient was started on meropenem for presumed drug resistant organism based on the history given above.  In addition due to the physical exam findings of dry mucous membranes and the laboratory findings of worsening renal function the patient was started on intravenous normal saline and he remains hemodynamically stable upon reevaluation.  Patient the patient was not given an oral fluid challenge initially because of the presentation of altered mental state.  He was kept n.p.o.  IV hospitalist and the patient is    IMPRESSION  1.  Urinary tract infection  2.  Presumed drug resistant organism  3.  Dehydration with renal insufficiency  4.  Altered mental state         Electronically signed by: Cy De Los Santos, 1/26/2019 7:01 PM

## 2019-01-27 NOTE — THERAPY
"Physical Therapy Evaluation completed.   Bed Mobility:  Supine to Sit: Minimal Assist  Transfers: Sit to Stand: Contact Guard Assist  Gait: Level Of Assist: Stand by Assist with Front-Wheel Walker       Plan of Care: Will benefit from Physical Therapy 2 times per week  Discharge Recommendations: Equipment: Will Continue to Assess for Equipment Needs. Post-acute therapy Discharge to home with home health for additional skilled therapy services.    Pt is a 89 yo male with diagnosis of UTI presenting with generalized weakness and deconditioning as well as confusion and therefore unable to determine pts PLOF and level of assist required at memory unit where pt resides. Recommend RN staff encourage ambulation 3xday with FWW. Pt reports feeling weak so PT will continue with progressing gait and therex. Pt should be ok to return to memory unit  provided pt gets assist whenever mobile.    See \"Rehab Therapy-Acute\" Patient Summary Report for complete documentation.     "

## 2019-01-27 NOTE — ED NOTES
Resting in bed, son will return as needed. Patient unable to void sitting on side of bed. Antibiotics infused.

## 2019-01-27 NOTE — PROGRESS NOTES
"Assessment completed along with admit profile to best of this RN's ability. Patient is poor historian, often saying \"I don't know\" in response to questioning, remains pleasant. Denies any pain/SOB. Due medications given. Bed alarm on, oriented to call light system.  "

## 2019-01-27 NOTE — ASSESSMENT & PLAN NOTE
Secondary to urinary tract infection, mentation is slightly worse than baseline of dementia.  Also possible 2/2 levaquin  Off this med.   Frequent reorientation.  Avoid sedative medications as able.  Treat urinary tract infection.

## 2019-01-27 NOTE — H&P
Hospital Medicine History & Physical Note    Date of Service  1/26/2019    Primary Care Physician  Thelma Cervantes M.D.    Consultants  Possibly infectious disease.    Code Status  DNAR/DNI    Chief Complaint  Increasing confusion recent UTI.    History of Presenting Illness  89 y.o. male who presented 1/26/2019 with failure of treatment outpatient for UTI.  He lives in a memory care unit and they have been noticing that he has been getting more confused.  Last week he was treated for urinary tract infection that ultimately grew out enterococcus.  This is according to the cultures [facility].  I do not have those results available to me now and I am awaiting them.  The patient's son Dr. Jozef Nunez is his power of  for healthcare, patient is DNR but they do agree to conservative treatment with antibiotics.    Review of Systems  Review of Systems   Unable to perform ROS: Dementia       Past Medical History   has a past medical history of Arrhythmia; Atrial fibrillation (HCC); Cancer (HCC); Congestive heart failure (HCC); and Hypertension.    Surgical History   has a past surgical history that includes lumbar fusion posterior; cholecystectomy; gyn surgery; other orthopedic surgery; shoulder arthroscopy; other cardiac surgery; and other cardiac surgery.     Family History  family history is not on file.     Social History   reports that he has never smoked. He has never used smokeless tobacco. He reports that he does not drink alcohol or use drugs.    Allergies  Allergies   Allergen Reactions   • Beta Adrenergic Blockers Anaphylaxis     HR goes into teens, Cardiology wants him to never have another beta blocker   • Penicillins      Unknown reaction         Medications  Prior to Admission Medications   Prescriptions Last Dose Informant Patient Reported? Taking?   Cholecalciferol (VITAMIN D PO) 1/25/2019 at 0800 MAR from Other Facility Yes No   Sig: Take 50,000 Units by mouth  every Friday.   Probiotic Product (ALIGN) 4 MG Cap 1/26/2019 at 1000 MAR from Other Facility Yes Yes   Sig: Take 4 mg by mouth every day.   Psyllium (REGULOID PO) 1/26/2019 at 0800 MAR from Other Facility Yes No   Sig: Take 1 Dose by mouth every day.   albuterol (PROAIR HFA) 108 (90 Base) MCG/ACT Aero Soln inhalation aerosol PRN at PRN MAR from Other Facility Yes No   Sig: Inhale 2 Puffs by mouth every 6 hours as needed for Shortness of Breath.   ascorbic acid (ASCORBIC ACID) 500 MG Tab 1/26/2019 at 0800 MAR from Other Facility Yes No   Sig: Take 500 mg by mouth every day.   aspirin EC (ECOTRIN) 81 MG Tablet Delayed Response 1/26/2019 at 0800 MAR from Other Facility Yes Yes   Sig: Take 81 mg by mouth every day.   atorvastatin (LIPITOR) 40 MG Tab 1/25/2019 at 1700 MAR from Other Facility Yes No   Sig: Take 40 mg by mouth every evening.   escitalopram (LEXAPRO) 10 MG Tab 1/26/2019 at 0800 MAR from Other Facility Yes No   Sig: Take 10 mg by mouth every day.   hydroCHLOROthiazide (HYDRODIURIL) 25 MG Tab 1/26/2019 at 0800 MAR from Other Facility Yes Yes   Sig: Take 25 mg by mouth every day.   levoFLOXacin (LEVAQUIN) 500 MG tablet 1/26/2019 at 0800 MAR from Other Facility Yes Yes   Sig: Take 500 mg by mouth every day. 7 day course   losartan (COZAAR) 25 MG Tab 1/26/2019 at 0800 MAR from Other Facility Yes No   Sig: Take 25 mg by mouth every morning.   memantine (NAMENDA) 5 MG Tab 1/25/2019 at 1700 MAR from Other Facility Yes No   Sig: Take 5 mg by mouth every day.   omeprazole (PRILOSEC) 20 MG delayed-release capsule 1/26/2019 at 0700 MAR from Other Facility Yes No   Sig: Take 20 mg by mouth every day.   tamsulosin (FLOMAX) 0.4 MG capsule 1/25/2019 at 1700 MAR from Other Facility Yes No   Sig: Take 0.4 mg by mouth every evening.   tiotropium (SPIRIVA) 18 MCG Cap 1/26/2019 at 0800 MAR from Other Facility Yes No   Sig: Inhale 18 mcg by mouth every day.      Facility-Administered Medications: None       Physical Exam  Temp:   [36.4 °C (97.5 °F)] 36.4 °C (97.5 °F)  Pulse:  [60-67] 60  Resp:  [7-30] 16  BP: (161)/(76) 161/76  SpO2:  [79 %-99 %] 99 %    Physical Exam   Constitutional: He appears well-developed and well-nourished. No distress.   Plan of care discussed with bedside RN.   HENT:   Nose: Nose normal.   Mouth/Throat: Oropharynx is clear and moist. No oropharyngeal exudate.   Eyes: Conjunctivae are normal. Right eye exhibits no discharge. Left eye exhibits no discharge. No scleral icterus.   Neck: No JVD present. No tracheal deviation present.   Cardiovascular: Normal rate, regular rhythm and normal heart sounds.    Pulmonary/Chest: Effort normal and breath sounds normal. No stridor. No respiratory distress. He has no wheezes. He has no rales. He exhibits no tenderness.   Abdominal: Soft. Bowel sounds are normal. He exhibits no distension. There is no tenderness.   Musculoskeletal: He exhibits no edema or tenderness.   Neurological: He is alert. He is disoriented. No cranial nerve deficit. He exhibits normal muscle tone.   Skin: Skin is warm and dry. He is not diaphoretic. There is pallor.   Psychiatric: He has a normal mood and affect. His behavior is normal.   Nursing note and vitals reviewed.      Laboratory:  Recent Labs      01/26/19   1500   WBC  8.7   RBC  4.36*   HEMOGLOBIN  14.0   HEMATOCRIT  42.3   MCV  97.0   MCH  32.1   MCHC  33.1*   RDW  52.3*   PLATELETCT  130*   MPV  10.6     Recent Labs      01/26/19   1500   SODIUM  134*   POTASSIUM  3.7   CHLORIDE  100   CO2  26   GLUCOSE  117*   BUN  26*   CREATININE  1.83*   CALCIUM  8.6     Recent Labs      01/26/19   1500   ALTSGPT  11   ASTSGOT  18   ALKPHOSPHAT  145*   TBILIRUBIN  1.7*   GLUCOSE  117*                 Recent Labs      01/26/19   1500   TROPONINI  0.03       Urinalysis:    No results found     Imaging:  DX-CHEST-PORTABLE (1 VIEW)   Final Result      1.  Mildly enlarged cardiac silhouette with central vascular congestion.   2.  Perihilar linear opacities are  likely due to atelectasis.   3.  Right paratracheal fullness may be due to vascular prominence.            Assessment/Plan:  I anticipate this patient will require at least two midnights for appropriate medical management, necessitating inpatient admission.    Complicated UTI (urinary tract infection)- (present on admission)   Assessment & Plan    Secondary to enterococcus most likely resistant organism, failed outpatient treatment with levofloxacin.  [facility]  Start meropenem IV, 3-day empiric course, consider ID consultation.     Dementia- (present on admission)   Assessment & Plan    Continue escitalopram and memantine.  Patient lives in a memory care unit.     Acute delirium   Assessment & Plan    Secondary to urinary tract infection, mentation is slightly worse than baseline of dementia.  Frequent reorientation.  Avoid sedative medications as able.  Treat urinary tract infection.     DNR (do not resuscitate)- (present on admission)   Assessment & Plan    Confirmed with the patient's son  Jozef Blancatino on admission. POLST on file.     Acute on chronic kidney failure (HCC)- (present on admission)   Assessment & Plan    Due to volume depletion secondary to urinary tract infection.  Normal saline infusion, daily BMP, avoid nephrotoxins.     GERD (gastroesophageal reflux disease)- (present on admission)   Assessment & Plan    Continue omeprazole 20 mg daily.     Essential hypertension- (present on admission)   Assessment & Plan    Continue losartan.         VTE prophylaxis: scd

## 2019-01-27 NOTE — CARE PLAN
Problem: Communication  Goal: The ability to communicate needs accurately and effectively will improve  Outcome: PROGRESSING AS EXPECTED    Intervention: [employer] patient and significant other/support system to call light to alert staff of needs  Patient oriented to call light system and all relevant hospital policies. Call light within reach and used appropriately when in need of assistance.        Problem: Infection  Goal: Will remain free from infection  Outcome: PROGRESSING AS EXPECTED    Intervention: Implement standard precautions and perform hand washing before and after patient contact  Standard precautions and hand hygiene practices implemented before and after patient room entry. Gloves worn during times of patient contact.

## 2019-01-28 ENCOUNTER — PATIENT OUTREACH (OUTPATIENT)
Dept: HEALTH INFORMATION MANAGEMENT | Facility: OTHER | Age: 84
End: 2019-01-28

## 2019-01-28 VITALS
HEIGHT: 67 IN | RESPIRATION RATE: 18 BRPM | WEIGHT: 145.94 LBS | TEMPERATURE: 97.2 F | OXYGEN SATURATION: 99 % | BODY MASS INDEX: 22.91 KG/M2 | DIASTOLIC BLOOD PRESSURE: 73 MMHG | HEART RATE: 62 BPM | SYSTOLIC BLOOD PRESSURE: 128 MMHG

## 2019-01-28 LAB
ALBUMIN SERPL BCP-MCNC: 2.7 G/DL (ref 3.2–4.9)
ALBUMIN/GLOB SERPL: 1 G/DL
ALP SERPL-CCNC: 133 U/L (ref 30–99)
ALT SERPL-CCNC: 7 U/L (ref 2–50)
ANION GAP SERPL CALC-SCNC: 7 MMOL/L (ref 0–11.9)
AST SERPL-CCNC: 31 U/L (ref 12–45)
BASOPHILS # BLD AUTO: 0.8 % (ref 0–1.8)
BASOPHILS # BLD: 0.07 K/UL (ref 0–0.12)
BILIRUB SERPL-MCNC: 1.9 MG/DL (ref 0.1–1.5)
BUN SERPL-MCNC: 25 MG/DL (ref 8–22)
CALCIUM SERPL-MCNC: 8.5 MG/DL (ref 8.4–10.2)
CHLORIDE SERPL-SCNC: 104 MMOL/L (ref 96–112)
CO2 SERPL-SCNC: 23 MMOL/L (ref 20–33)
CREAT SERPL-MCNC: 1.64 MG/DL (ref 0.5–1.4)
EOSINOPHIL # BLD AUTO: 0.36 K/UL (ref 0–0.51)
EOSINOPHIL NFR BLD: 4.3 % (ref 0–6.9)
ERYTHROCYTE [DISTWIDTH] IN BLOOD BY AUTOMATED COUNT: 48.5 FL (ref 35.9–50)
GLOBULIN SER CALC-MCNC: 2.6 G/DL (ref 1.9–3.5)
GLUCOSE BLD-MCNC: 109 MG/DL (ref 65–99)
GLUCOSE SERPL-MCNC: 102 MG/DL (ref 65–99)
HCT VFR BLD AUTO: 37.8 % (ref 42–52)
HGB BLD-MCNC: 12.9 G/DL (ref 14–18)
IMM GRANULOCYTES # BLD AUTO: 0.04 K/UL (ref 0–0.11)
IMM GRANULOCYTES NFR BLD AUTO: 0.5 % (ref 0–0.9)
LYMPHOCYTES # BLD AUTO: 1.21 K/UL (ref 1–4.8)
LYMPHOCYTES NFR BLD: 14.4 % (ref 22–41)
MCH RBC QN AUTO: 32 PG (ref 27–33)
MCHC RBC AUTO-ENTMCNC: 34.1 G/DL (ref 33.7–35.3)
MCV RBC AUTO: 93.8 FL (ref 81.4–97.8)
MONOCYTES # BLD AUTO: 0.67 K/UL (ref 0–0.85)
MONOCYTES NFR BLD AUTO: 8 % (ref 0–13.4)
NEUTROPHILS # BLD AUTO: 6.06 K/UL (ref 1.82–7.42)
NEUTROPHILS NFR BLD: 72 % (ref 44–72)
NRBC # BLD AUTO: 0 K/UL
NRBC BLD-RTO: 0 /100 WBC
PLATELET # BLD AUTO: 113 K/UL (ref 164–446)
PMV BLD AUTO: 11.7 FL (ref 9–12.9)
POTASSIUM SERPL-SCNC: 4.5 MMOL/L (ref 3.6–5.5)
PROT SERPL-MCNC: 5.3 G/DL (ref 6–8.2)
RBC # BLD AUTO: 4.03 M/UL (ref 4.7–6.1)
SODIUM SERPL-SCNC: 134 MMOL/L (ref 135–145)
WBC # BLD AUTO: 8.4 K/UL (ref 4.8–10.8)

## 2019-01-28 PROCEDURE — 700111 HCHG RX REV CODE 636 W/ 250 OVERRIDE (IP): Performed by: HOSPITALIST

## 2019-01-28 PROCEDURE — A9270 NON-COVERED ITEM OR SERVICE: HCPCS | Performed by: INTERNAL MEDICINE

## 2019-01-28 PROCEDURE — A9270 NON-COVERED ITEM OR SERVICE: HCPCS | Performed by: HOSPITALIST

## 2019-01-28 PROCEDURE — 700102 HCHG RX REV CODE 250 W/ 637 OVERRIDE(OP): Performed by: INTERNAL MEDICINE

## 2019-01-28 PROCEDURE — 80053 COMPREHEN METABOLIC PANEL: CPT

## 2019-01-28 PROCEDURE — 99232 SBSQ HOSP IP/OBS MODERATE 35: CPT | Performed by: INTERNAL MEDICINE

## 2019-01-28 PROCEDURE — 51798 US URINE CAPACITY MEASURE: CPT

## 2019-01-28 PROCEDURE — 700102 HCHG RX REV CODE 250 W/ 637 OVERRIDE(OP): Performed by: HOSPITALIST

## 2019-01-28 PROCEDURE — 82962 GLUCOSE BLOOD TEST: CPT

## 2019-01-28 PROCEDURE — 85025 COMPLETE CBC W/AUTO DIFF WBC: CPT

## 2019-01-28 PROCEDURE — 99239 HOSP IP/OBS DSCHRG MGMT >30: CPT | Performed by: HOSPITALIST

## 2019-01-28 RX ORDER — LINEZOLID 600 MG/1
600 TABLET, FILM COATED ORAL EVERY 12 HOURS
Qty: 4 TAB | Refills: 0 | Status: SHIPPED | OUTPATIENT
Start: 2019-01-28 | End: 2019-01-30

## 2019-01-28 RX ADMIN — LOSARTAN POTASSIUM 25 MG: 25 TABLET, FILM COATED ORAL at 05:10

## 2019-01-28 RX ADMIN — OMEPRAZOLE 20 MG: 20 CAPSULE, DELAYED RELEASE ORAL at 05:08

## 2019-01-28 RX ADMIN — LINEZOLID 600 MG: 600 TABLET, FILM COATED ORAL at 05:08

## 2019-01-28 RX ADMIN — HEPARIN SODIUM 5000 UNITS: 5000 INJECTION, SOLUTION INTRAVENOUS; SUBCUTANEOUS at 05:05

## 2019-01-28 RX ADMIN — OXYCODONE HYDROCHLORIDE AND ACETAMINOPHEN 500 MG: 500 TABLET ORAL at 05:08

## 2019-01-28 RX ADMIN — ASPIRIN 81 MG: 81 TABLET, COATED ORAL at 05:08

## 2019-01-28 RX ADMIN — MEMANTINE HYDROCHLORIDE 5 MG: 10 TABLET ORAL at 05:08

## 2019-01-28 RX ADMIN — TIOTROPIUM BROMIDE 1 CAPSULE: 18 CAPSULE ORAL; RESPIRATORY (INHALATION) at 05:06

## 2019-01-28 RX ADMIN — ESCITALOPRAM OXALATE 10 MG: 10 TABLET ORAL at 05:08

## 2019-01-28 NOTE — DIETARY
"Nutrition services: Day 2 of admit.  Jie Nunez is a 89 y.o. male with admitting DX of Complicated UTI.     Consult received for poor PO intake PTA.     Spoke with nurse regarding pt. Stated pt with dementia and very confused.Not appropriate for interview. Pt from a memory care unit noted with increased confusion on admit 2/2 UTI.     Assessment:  Height: 170.2 cm (5' 7\")  Weight: 66.2 kg (145 lb 15.1 oz)  Body mass index is 22.86 kg/m².   Diet/Intake: Regular. <50% x 2 and >50% x 2     Evaluation:   1. BMI low for age at 22.86 (>66 yo >23 BMI)   2. Pt with failure outpt for trx of UTI. Lives in a memory care unit.   Meds: Vit C 500mg QD, Namenda   Skin: 2+ edema to BLE though noted as baseline for pt. Pt with no decubs noted.   Labs: BUN 25 GFR 39 Alk Phos 133 Alb 2.7     No indication for malnutrition risk at this time.     Recommendations/Plan:  1. Diet as ordered.   2. Encourage intake of 50% or greater.   3. Document intake of all meals  as % taken in ADL's to provide interdisciplinary communication across all shifts.   4. Monitor weight.  5. Nutrition rep will continue to see patient for ongoing meal and snack preferences.     RD to follow per protocol. Pt intake adequate and pending discharge.    "

## 2019-01-28 NOTE — PROGRESS NOTES
Infectious Disease Progress Note    Author: Solange Romero M.D. Date & Time of service: 2019  3:17 PM    Chief Complaint:  UTI    Interval History:  89-year-old male with dementia and atrial fibrillation, admitted due to persistent altered mental status after a   failure treatment of urinary tract infection   AF somnolent but easily awakened denies pain    Labs Reviewed and Medications Reviewed.    Review of Systems:  Review of Systems   Unable to perform ROS: Dementia       Hemodynamics:  Temp (24hrs), Av.4 °C (97.5 °F), Min:36.2 °C (97.2 °F), Max:36.5 °C (97.7 °F)  Temperature: 36.2 °C (97.2 °F)  Pulse  Av.2  Min: 60  Max: 71   Blood Pressure : 128/73       Physical Exam:  Physical Exam   Constitutional: He appears well-developed. No distress.   HENT:   Head: Normocephalic and atraumatic.   Eyes: Pupils are equal, round, and reactive to light. EOM are normal.   Neck: Neck supple.   Cardiovascular:   IRR   Pulmonary/Chest: Effort normal. No respiratory distress.   Abdominal: Soft. He exhibits no distension. There is no tenderness. There is no guarding.   Neurological:   Somnolent but awakens easily  Follows commands  Hard of hearing   Skin: He is not diaphoretic.   Nursing note and vitals reviewed.      Meds:    Current Facility-Administered Medications:   •  linezolid  •  heparin  •  ascorbic acid  •  aspirin EC  •  atorvastatin  •  escitalopram  •  losartan  •  memantine  •  omeprazole  •  tamsulosin  •  tiotropium    Labs:  Recent Labs      19   1500  19   0937  19   0433   WBC  8.7  9.2  8.4   RBC  4.36*  4.44*  4.03*   HEMOGLOBIN  14.0  14.1  12.9*   HEMATOCRIT  42.3  42.7  37.8*   MCV  97.0  96.2  93.8   MCH  32.1  31.8  32.0   RDW  52.3*  50.6*  48.5   PLATELETCT  130*  128*  113*   MPV  10.6  11.1  11.7   NEUTSPOLYS  79.00*  77.50*  72.00   LYMPHOCYTES  9.80*  10.20*  14.40*   MONOCYTES  7.10  7.80  8.00   EOSINOPHILS  3.10  3.30  4.30   BASOPHILS  0.70  0.70  0.80      Recent Labs      01/26/19   1500  01/27/19   0937  01/28/19   0433   SODIUM  134*  134*  134*   POTASSIUM  3.7  3.9  4.5   CHLORIDE  100  100  104   CO2  26  24  23   GLUCOSE  117*  92  102*   BUN  26*  23*  25*     Recent Labs      01/26/19   1500  01/27/19   0937  01/28/19   0433   ALBUMIN  3.2  3.1*  2.7*   TBILIRUBIN  1.7*  2.3*  1.9*   ALKPHOSPHAT  145*  153*  133*   TOTPROTEIN  6.6  6.2  5.3*   ALTSGPT  11  12  7   ASTSGOT  18  25  31   CREATININE  1.83*  1.64*  1.64*       Imaging:  Dx-chest-portable (1 View)    Result Date: 1/26/2019 1/26/2019 3:14 PM HISTORY/REASON FOR EXAM:  Hypertension. CHF. Pacemaker. TECHNIQUE/EXAM DESCRIPTION AND NUMBER OF VIEWS: Single portable view of the chest. COMPARISON: 12/20/2018 FINDINGS: Single lead pacemaker is unchanged in appearance. The mediastinal and cardiac silhouette is enlarged. Right superior paratracheal fullness could be due to vascular prominence. There is atherosclerosis of the aorta. There is perihilar bronchial wall thickening. There is minimal linear perihilar opacity. There is no focal airspace process. There is no significant pleural effusion. There is no visible pneumothorax. There is chronic degenerative change or posterior matter change of the left glenohumeral joint.     1.  Mildly enlarged cardiac silhouette with central vascular congestion. 2.  Perihilar linear opacities are likely due to atelectasis. 3.  Right paratracheal fullness may be due to vascular prominence.    Us-renal    Result Date: 1/27/2019 1/27/2019 4:21 PM HISTORY/REASON FOR EXAM:  Abnormal Labs TECHNIQUE/EXAM DESCRIPTION: Renal ultrasound. COMPARISON:  Right upper quadrant ultrasound 6/18/2015 FINDINGS: The right kidney measures . The left kidney measures . There is no hydronephrosis. There is bilateral cortical thinning. There are no abnormal calcifications. There is a 12 mm right renal cortical cyst. The bladder demonstrates no focal wall abnormality.     1.  There is  "bilateral renal cortical thinning consistent with medical renal disease. 2.  There is a small right renal cyst.      Micro:  Results     Procedure Component Value Units Date/Time    URINALYSIS CULTURE, IF INDICATED [[id number]]  (Abnormal) Collected:  01/27/19 0916    Order Status:  Completed Specimen:  Urine Updated:  01/27/19 0949     Color Yellow     Character Clear     Specific Gravity 1.020     Ph 7.0     Glucose Negative mg/dL      Ketones Negative mg/dL      Protein 100 (A) mg/dL      Bilirubin Negative     Nitrite Negative     Leukocyte Esterase Negative     Occult Blood Negative     Micro Urine Req Microscopic    BLOOD CULTURE [[id number]] Collected:  01/26/19 1500    Order Status:  Completed Specimen:  Blood from Peripheral Updated:  01/27/19 0644     Significant Indicator NEG     Source BLD     Site PERIPHERAL     Blood Culture No Growth    Note: Blood cultures are incubated for 5 days and  are monitored continuously.Positive blood cultures  are called to the RN and reported as soon as  they are identified.    Blood culture testing and Gram stain, if indicated, are  performed at [address].  Positive blood cultures are  sent to [facility], [address] [place] [address], for organism identification and  susceptibility testing.      Narrative:       Per Hospital Policy: Only change Specimen Src: to \"Line\" if  specified by physician order.    BLOOD CULTURE [[id number]] Collected:  01/26/19 1500    Order Status:  Completed Specimen:  Blood from Peripheral Updated:  01/27/19 0644     Significant Indicator NEG     Source BLD     Site PERIPHERAL     Blood Culture No Growth    Note: Blood cultures are incubated for 5 days and  are monitored continuously.Positive blood cultures  are called to the RN and reported as soon as  they are identified.    Blood culture testing and Gram stain, if indicated, are  performed at [facility]" "[facility], [address]  [facility] [place], [place].  Positive blood cultures are  sent to [facility], [address] [place] [address], for organism identification and  susceptibility testing.      Narrative:       Per Hospital Policy: Only change Specimen Src: to \"Line\" if  specified by physician order.          Assessment:  Active Hospital Problems    Diagnosis   • Complicated UTI (urinary tract infection) [N39.0]   Delerium  RADHA    Plan:  UTI  Afebrile   No leukocytosis  AMS-improved  Bcxs neg  OSH Ucx by report +enterococcus  Admitting urinalysis showed 0 wbc's.   Continue Zyvox for 3 days  Monitor his platelets due to thrombocytopenia    RADHA  Improved  Continue to monitor.    Discussed with internal medicine.  "

## 2019-01-28 NOTE — DISCHARGE SUMMARY
Discharge Summary    CHIEF COMPLAINT ON ADMISSION  Chief Complaint   Patient presents with   • ALOC     per family member, pt seemed more confused today       Reason for Admission  Complicated UTI (urinary tract inf*     Admission Date  1/26/2019    CODE STATUS  Prior    HPI & HOSPITAL COURSE  This is a 89 y.o. male here with confusion. He had a hostpry of a recent UTI treated with Levaquin through his PCP. He was admitted with acute delirium and his urine culture was obtained from his PCP and noted to be positive for enterococcus. He was seen by ID and placed onto Zyvox. He improved rapidly with this therapy. Sensitivities were not available from his previous culture and thus he will complete a 3 day course after discharge ov zyvox. He is feeling well at the time of discharge.     Therefore, he is discharged in good and stable condition to home with close outpatient follow-up.    The patient met 2-midnight criteria for an inpatient stay at the time of discharge.    Discharge Date  1/28/2019    FOLLOW UP ITEMS POST DISCHARGE  none    DISCHARGE DIAGNOSES  Active Problems:    Complicated UTI (urinary tract infection) POA: Yes    Dementia POA: Yes    Persistent atrial fibrillation (HCC) POA: Yes    Essential hypertension POA: Yes    GERD (gastroesophageal reflux disease) POA: Yes    Hyponatremia POA: Yes    Tachycardia-bradycardia syndrome (HCC) POA: Yes    Acute on chronic kidney failure (HCC) POA: Yes    DNR (do not resuscitate) POA: Yes    Acute delirium POA: Unknown    Pulmonary hypertension (HCC) POA: Unknown  Resolved Problems:    * No resolved hospital problems. *      FOLLOW UP  Future Appointments  Date Time Provider Department Center   3/5/2019 2:00 PM Daniele Rebolledo M.D. [facility] None     ALDA Javier  [address]  [address]  [facility] [address]  [phone]      [facility]  LEFT A MSSG FOR YOUR PROVIDER REGARDING NEED FOR A FOLLOW UP APPOINTMENT. PLEASE CALL THEIR OFFICE IF YOU DO NOT HEAR FROM  THEM WITH IN 2 DAYS. THANK YOU    Thelma Cervantes M.D.  [address] [address]  [facility] [address] [phone]            MEDICATIONS ON DISCHARGE     Medication List      START taking these medications      Instructions   linezolid 600 MG Tabs  Commonly known as:  ZYVOX   Take 1 Tab by mouth every 12 hours for 2 days.  Dose:  600 mg        CONTINUE taking these medications      Instructions   ALIGN 4 MG Caps   Take 4 mg by mouth every day.  Dose:  4 mg     ascorbic acid 500 MG Tabs  Commonly known as:  ascorbic acid   Take 500 mg by mouth every day.  Dose:  500 mg     aspirin EC 81 MG Tbec  Commonly known as:  ECOTRIN   Take 81 mg by mouth every day.  Dose:  81 mg     atorvastatin 40 MG Tabs  Commonly known as:  LIPITOR   Take 40 mg by mouth every evening.  Dose:  40 mg     escitalopram 10 MG Tabs  Commonly known as:  LEXAPRO   Take 10 mg by mouth every day.  Dose:  10 mg     hydroCHLOROthiazide 25 MG Tabs  Commonly known as:  HYDRODIURIL   Take 25 mg by mouth every day.  Dose:  25 mg     losartan 25 MG Tabs  Commonly known as:  COZAAR   Take 25 mg by mouth every morning.  Dose:  25 mg     memantine 5 MG Tabs  Commonly known as:  NAMENDA   Take 5 mg by mouth every day.  Dose:  5 mg     omeprazole 20 MG delayed-release capsule  Commonly known as:  PRILOSEC   Take 20 mg by mouth every day.  Dose:  20 mg     PROAIR  (90 Base) MCG/ACT Aers inhalation aerosol  Generic drug:  albuterol   Inhale 2 Puffs by mouth every 6 hours as needed for Shortness of Breath.  Dose:  2 Puff     REGULOID PO   Take 1 Dose by mouth every day.  Dose:  1 Dose     tamsulosin 0.4 MG capsule  Commonly known as:  FLOMAX   Take 0.4 mg by mouth every evening.  Dose:  0.4 mg     tiotropium 18 MCG Caps  Commonly known as:  SPIRIVA   Inhale 18 mcg by mouth every day.  Dose:  18 mcg     VITAMIN D PO   Take 50,000 Units by mouth every Friday.  Dose:  09239 Units        STOP taking these medications    levoFLOXacin 500 MG tablet  Commonly known as:   LEVAQUIN            Allergies  Allergies   Allergen Reactions   • Beta Adrenergic Blockers Anaphylaxis     HR goes into teens, Cardiology wants him to never have another beta blocker   • Penicillins      Unknown reaction         DIET  No orders of the defined types were placed in this encounter.      ACTIVITY  As tolerated.  Weight bearing as tolerated    CONSULTATIONS  ID- Dr Romero    PROCEDURES  none    LABORATORY  Lab Results   Component Value Date    SODIUM 134 (L) 01/28/2019    POTASSIUM 4.5 01/28/2019    CHLORIDE 104 01/28/2019    CO2 23 01/28/2019    GLUCOSE 102 (H) 01/28/2019    BUN 25 (H) 01/28/2019    CREATININE 1.64 (H) 01/28/2019        Lab Results   Component Value Date    WBC 8.4 01/28/2019    HEMOGLOBIN 12.9 (L) 01/28/2019    HEMATOCRIT 37.8 (L) 01/28/2019    PLATELETCT 113 (L) 01/28/2019        Total time of the discharge process exceeds 35 minutes.

## 2019-01-28 NOTE — PROGRESS NOTES
Pt back to bed. Due meds given. Pt voided in urinal. Bedside report given to Mohan PEREZ. POC discussed. Pt resting comfortably in bed. Safety precautions in place.

## 2019-01-28 NOTE — RESPIRATORY CARE
COPD EDUCATION by COPD CLINICAL EDUCATOR  1/28/2019 at 8:50 AM by Che Charlton     Patient reviewed by COPD education team. Patient does not qualify for COPD program.

## 2019-01-28 NOTE — PROGRESS NOTES
Telemetry:    Rate: 50-60s  Rhythm: 100% paced (underlying afibrillation)  Ectopy: Frequent pvcs    Measurement:  Paced

## 2019-01-28 NOTE — DISCHARGE PLANNING
Anticipated Discharge Disposition: d/c back to [facility] memory care unit    Action: Spoke to bedside RN who indicates she believes the facility has transport services.    Spoke to facility and they indicate either the family or [facility] can provide transport as they do not have this service. However, whenever he arrives they will be able to accept him.    RAGHAVw updated RN.    LSw completed internal transport from for [employer] as pt is not A/O and can sit up in a w/c during transport, per RN.      LSw faxed to [facility] for f/u. Lsw left  w/ [facility] requesging f/u.    Barriers to Discharge: transport approval    Plan: f/u w/ [facility], medical team

## 2019-01-28 NOTE — DISCHARGE PLANNING
Anticipated Discharge Disposition: d/c home to memory care unit    Action: Spoke to [facility] who advises pt's p/u time of 3PM.    LSw updated bedside RN as above.     Barriers to Discharge: none    Plan: pt d/cing home via [facility]

## 2019-01-28 NOTE — PROGRESS NOTES
Patient discharged to home ([place]) via transport. Discharge paperwork given, belongings sent with patient.

## 2019-01-28 NOTE — DISCHARGE INSTRUCTIONS
Discharge Instructions    Discharged to home by medical transportation with escort. Discharged via wheelchair, hospital escort: Yes.  Special equipment needed: Not Applicable    Be sure to schedule a follow-up appointment with your primary care doctor or any specialists as instructed.     Discharge Plan:   Diet Plan: Discussed  Activity Level: Discussed  Confirmed Follow up Appointment: Patient to Call and Schedule Appointment  Confirmed Symptoms Management: Discussed  Medication Reconciliation Updated: Yes  Influenza Vaccine Indication: Not indicated: Previously immunized this influenza season and > 8 years of age    I understand that a diet low in cholesterol, fat, and sodium is recommended for good health. Unless I have been given specific instructions below for another diet, I accept this instruction as my diet prescription.   Other diet: Regular    Special Instructions: None      Urinary Tract Infection, Adult  A urinary tract infection (UTI) is an infection of any part of the urinary tract, which includes the kidneys, ureters, bladder, and urethra. These organs make, store, and get rid of urine in the body. UTI can be a bladder infection (cystitis) or kidney infection (pyelonephritis).  What are the causes?  This infection may be caused by fungi, viruses, or bacteria. Bacteria are the most common cause of UTIs. This condition can also be caused by repeated incomplete emptying of the bladder during urination.  What increases the risk?  This condition is more likely to develop if:  · You ignore your need to urinate or hold urine for long periods of time.  · You do not empty your bladder completely during urination.  · You wipe back to front after urinating or having a bowel movement, if you are female.  · You are uncircumcised, if you are male.  · You are constipated.  · You have a urinary catheter that stays in place (indwelling).  · You have a weak defense (immune) system.  · You have a medical condition that  affects your bowels, kidneys, or bladder.  · You have diabetes.  · You take antibiotic medicines frequently or for long periods of time, and the antibiotics no longer work well against certain types of infections (antibiotic resistance).  · You take medicines that irritate your urinary tract.  · You are exposed to chemicals that irritate your urinary tract.  · You are female.  What are the signs or symptoms?  Symptoms of this condition include:  · Fever.  · Frequent urination or passing small amounts of urine frequently.  · Needing to urinate urgently.  · Pain or burning with urination.  · Urine that smells bad or unusual.  · Cloudy urine.  · Pain in the lower abdomen or back.  · Trouble urinating.  · Blood in the urine.  · Vomiting or being less hungry than normal.  · Diarrhea or abdominal pain.  · Vaginal discharge, if you are female.  How is this diagnosed?  This condition is diagnosed with a medical history and physical exam. You will also need to provide a urine sample to test your urine. Other tests may be done, including:  · Blood tests.  · Sexually transmitted disease (STD) testing.  If you have had more than one UTI, a cystoscopy or imaging studies may be done to determine the cause of the infections.  How is this treated?  Treatment for this condition often includes a combination of two or more of the following:  · Antibiotic medicine.  · Other medicines to treat less common causes of UTI.  · Over-the-counter medicines to treat pain.  · Drinking enough water to stay hydrated.  Follow these instructions at home:  · Take over-the-counter and prescription medicines only as told by your health care provider.  · If you were prescribed an antibiotic, take it as told by your health care provider. Do not stop taking the antibiotic even if you start to feel better.  · Avoid alcohol, caffeine, tea, and carbonated beverages. They can irritate your bladder.  · Drink enough fluid to keep your urine clear or pale  yellow.  · Keep all follow-up visits as told by your health care provider. This is important.  · Make sure to:  ¨ Empty your bladder often and completely. Do not hold urine for long periods of time.  ¨ Empty your bladder before and after sex.  ¨ Wipe from front to back after a bowel movement if you are female. Use each tissue one time when you wipe.  Contact a health care provider if:  · You have back pain.  · You have a fever.  · You feel nauseous or vomit.  · Your symptoms do not get better after 3 days.  · Your symptoms go away and then return.  Get help right away if:  · You have severe back pain or lower abdominal pain.  · You are vomiting and cannot keep down any medicines or water.  This information is not intended to replace advice given to you by your health care provider. Make sure you discuss any questions you have with your health care provider.  Document Released: 09/27/2006 Document Revised: 05/31/2017 Document Reviewed: 11/07/2016  [employer] Interactive Patient Education © 2017 [employer]      · Is patient discharged on Warfarin / Coumadin?   No     Depression / Suicide Risk    As you are discharged from this [facility] facility, it is important to learn how to keep safe from harming yourself.    Recognize the warning signs:  · Abrupt changes in personality, positive or negative- including increase in energy   · Giving away possessions  · Change in eating patterns- significant weight changes-  positive or negative  · Change in sleeping patterns- unable to sleep or sleeping all the time   · Unwillingness or inability to communicate  · Depression  · Unusual sadness, discouragement and loneliness  · Talk of wanting to die  · Neglect of personal appearance   · Rebelliousness- reckless behavior  · Withdrawal from people/activities they love  · Confusion- inability to concentrate     If you or a loved one observes any of these behaviors or has concerns about self-harm, here's what you can do:  · Talk  about it- your feelings and reasons for harming yourself  · Remove any means that you might use to hurt yourself (examples: pills, rope, extension cords, firearm)  · Get professional help from the community (Mental Health, Substance Abuse, psychological counseling)  · Do not be alone:Call your Safe Contact- someone whom you trust who will be there for you.  · Call your local CRISIS HOTLINE 174-9705 or 556-817-5995  · Call your local Children's Mobile Crisis Response Team [place] (927) 379-2745 or wwwFast FiBR  · Call the toll free National Suicide Prevention Hotlines   · National Suicide Prevention Lifeline 723-642-BZMC (1876)  · National Hope Line Network 800-SUICIDE (089-5521)

## 2019-01-28 NOTE — PROGRESS NOTES
Patient expressed urge to urinate, but currently unable.  Bladder scan revealed 317 mL in bladder. Continuing to monitor.

## 2019-01-28 NOTE — CARE PLAN
Problem: Infection  Goal: Will remain free from infection  Outcome: PROGRESSING AS EXPECTED  No new signs or symptoms of infection at this time.     Problem: Mobility  Goal: Risk for activity intolerance will decrease  Outcome: PROGRESSING AS EXPECTED  Patient able to ambulate with standby assistance.  Bears entire weight during activity.

## 2019-01-28 NOTE — ASSESSMENT & PLAN NOTE
Start tele monitoring. I Reviewed his ekg- paced in afib. High risk for RVR with infection  Correct lytes  Volume repletion  On asa- not a great full anticoagulation candidate.

## 2019-01-28 NOTE — DISCHARGE PLANNING
Received Transport Form @ 1956  Spoke to Estevan @ [facility]    Transport is scheduled for 01-28-19 @1500 [address] to [address]

## 2019-02-05 ENCOUNTER — TELEPHONE (OUTPATIENT)
Dept: CARDIOLOGY | Facility: MEDICAL CENTER | Age: 84
End: 2019-02-05

## 2019-02-05 DIAGNOSIS — G47.34 NOCTURNAL HYPOXIA: ICD-10-CM

## 2019-02-05 NOTE — TELEPHONE ENCOUNTER
OPO Results / Echo Order   Received: Yesterday      Call with results   Message Contents   Hortensia ARACELIS Davis, Med Ass't  Tarahbilly Jonas R.N.   Phone Number: [phone]             [facility] Tarah Nunez called on behalf of his dad Jie; he asked that you give him a call to go over the results of his dad's OPO test that he had done when you can. Also, he misplaced the order they received for his dad's Echo and asked that we mail another one out for him.     Thank you,     Hortensia      Discussed with FK  Please order STAT referral to sleep studies      Dr. Nunez called. No answer, voicemail left with request for call back and contact information.    Referral placed.    ===========================================================================    Dr. Nunez calls back and  warm transfers the call to me.    He states his Dad has not been doing well and they may not proceed with the sleep study but will be in touch with the office if they decide not to do so. Informed to call [phone] and remind then it is an urgent if they decide to proceed. OPO discussed with Dr. Nunez in greater detail to discuss his Dad's fatigue.    Also notified to schedule echo he does not need paper order but to call [phone]

## 2019-02-11 ENCOUNTER — APPOINTMENT (OUTPATIENT)
Dept: RADIOLOGY | Facility: MEDICAL CENTER | Age: 84
End: 2019-02-11
Attending: EMERGENCY MEDICINE
Payer: OTHER GOVERNMENT

## 2019-02-11 ENCOUNTER — HOSPITAL ENCOUNTER (EMERGENCY)
Facility: MEDICAL CENTER | Age: 84
End: 2019-02-11
Attending: EMERGENCY MEDICINE
Payer: OTHER GOVERNMENT

## 2019-02-11 VITALS
RESPIRATION RATE: 20 BRPM | TEMPERATURE: 97.7 F | DIASTOLIC BLOOD PRESSURE: 80 MMHG | BODY MASS INDEX: 25.11 KG/M2 | HEART RATE: 60 BPM | OXYGEN SATURATION: 94 % | WEIGHT: 160 LBS | HEIGHT: 67 IN | SYSTOLIC BLOOD PRESSURE: 159 MMHG

## 2019-02-11 DIAGNOSIS — S02.2XXA CLOSED FRACTURE OF NASAL BONE, INITIAL ENCOUNTER: ICD-10-CM

## 2019-02-11 DIAGNOSIS — S00.81XA ABRASION OF FACE, INITIAL ENCOUNTER: ICD-10-CM
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
## 2019-02-11 LAB — EKG IMPRESSION: NORMAL

## 2019-02-11 PROCEDURE — 99284 EMERGENCY DEPT VISIT MOD MDM: CPT

## 2019-02-11 PROCEDURE — 70486 CT MAXILLOFACIAL W/O DYE: CPT

## 2019-02-11 PROCEDURE — 70450 CT HEAD/BRAIN W/O DYE: CPT

## 2019-02-11 PROCEDURE — 93005 ELECTROCARDIOGRAM TRACING: CPT | Performed by: EMERGENCY MEDICINE

## 2019-02-11 NOTE — ED PROVIDER NOTES
ED Provider Note    CHIEF COMPLAINT  No chief complaint on file.      HPI  Jie Nunez is a 89 y.o. male who presents for evaluation after ground-level fall.  I received a phone call from Dr. Nunez who is the patient's son.  His father has dementia and is in a memory care facility.  He states that just this past Friday he was placed on hospice.  He was contacted by the facility and told that his father had a ground-level fall and had a bloody nose therefore he is transported here.  The patient has no complaints at this time but is an unreliable historian based on his memory issues.    REVIEW OF SYSTEMS  See HPI for further details. All other systems negative.    PAST MEDICAL HISTORY  Past Medical History:   Diagnosis Date   • Arrhythmia    • Atrial fibrillation (HCC)    • Cancer (HCC)     prostate   • Congestive heart failure (HCC)    • Hypertension        FAMILY HISTORY  Family History   Problem Relation Age of Onset   • Heart Disease Neg Hx    • Heart Failure Neg Hx        SOCIAL HISTORY  Social History     Social History   • Marital status: Single     Spouse name: N/A   • Number of children: N/A   • Years of education: N/A     Social History Main Topics   • Smoking status: Never Smoker   • Smokeless tobacco: Never Used   • Alcohol use No   • Drug use: No   • Sexual activity: Not on file     Other Topics Concern   • Not on file     Social History Narrative    ** Merged History Encounter **            SURGICAL HISTORY  Past Surgical History:   Procedure Laterality Date   • CHOLECYSTECTOMY     • GYN SURGERY      leyda   • LUMBAR FUSION POSTERIOR     • OTHER CARDIAC SURGERY      A-fib   • OTHER CARDIAC SURGERY      chf   • OTHER ORTHOPEDIC SURGERY      lumbar lami   • SHOULDER ARTHROSCOPY         CURRENT MEDICATIONS  Home Medications    **Home medications have not yet been reviewed for this encounter**         ALLERGIES  Allergies   Allergen Reactions   • Beta Adrenergic Blockers Anaphylaxis     HR goes into  teens, Cardiology wants him to never have another beta blocker   • Penicillins      Unknown reaction         PHYSICAL EXAM  VITAL SIGNS: Reviewed   Constitutional: Well developed, Well nourished   HENT: Abrasion to the nose.  There is swelling to the bridge of the nose.  There is some dried blood at the nostrils but no active epistaxis.  He does have bilateral periorbital ecchymosis.  No malocclusion.  Eyes:  EOMI, Conjunctiva normal, No discharge.   Cardiovascular: Normal heart rate, Normal rhythm, No murmurs, No rubs, No gallops.   Thorax & Lungs: Clear to auscultation without wheezes, rales, or rhonchi. No chest tenderness.   Abdomen: Soft nontender.  Skin: Warm, Dry.  Musculoskeletal: Good range of motion in all major joints. No tenderness to palpation or major deformities noted.   Neurologic: Awake and alert, No focal deficits noted.       RADIOLOGY/PROCEDURES  CT-HEAD W/O   Final Result      No acute intracranial abnormality is identified.      There are periventricular and subcortical white matter changes present.  This finding is nonspecific and could be from previous small vessel ischemia, demyelination, or gliosis.      Focal hypodensity in the right basal ganglia is likely related to a prior lacunar infarct.      Atrophy      Nasal bone fractures with overlying soft tissue swelling.      Small amount of fluid in the mastoid air cells on the right.      CT-MAXILLOFACIAL W/O PLUS RECONS   Final Result      BILATERAL nasal bone fractures            COURSE & MEDICAL DECISION MAKING  Pertinent Labs & Imaging studies reviewed. (See chart for details)  This is a 89-year-old here for evaluation after a ground-level fall.  He is the father of Jozef Nunez who is a local gastroenterologist.  I received a phone call from Jozef and we discussed the case prior to his father's arrival.  Due to the patient's dementia he is not able to really give any history he states that he feels absolutely fine now.  I think his exact  words were I feel great.  On exam he appears neurologically intact moving all extremities spontaneously and symmetrically and it appears his only site of trauma is to his nose and face.  He has no active bleeding.  According to Jozef he was placed on hospice 3 days ago.  CT scan of the head and facial bones is obtained.  He has bilateral nasal bone fractures but otherwise the studies show no evidence of acute abnormalities.  Upon repeat evaluation the patient is sitting up in awake and alert and states he feels fine.  His periorbital ecchymosis seems to be evolving at this point.  He still has no acute bleeding at this point.  I have contacted Jozef and we have discussed the case.  I believe the patient is fine for discharge back to his care facility.  I do not think he requires nasal packing at this time.  He will be provided discharge instructions on nasal fractures and abrasions.  I requested that he return to the emergency department at any time if he feels he needs to.    FINAL IMPRESSION  1.  Ground-level fall  2.  Bilateral nasal bone fracture  3.  Facial abrasion  4.  Bilateral periorbital ecchymosis         Electronically signed by: Bill Vasquez, 2/11/2019 8:41 AM

## 2019-02-11 NOTE — DISCHARGE PLANNING
Medical Social Work    Referral: Transportation back to [facility]    Intervention: LSW notified by bedside RN that pt is ready to dc back to [facility] ([phone]) [place] [address]. LSW called and spoke w/ Trixie who confirmed that pt is a resident in their Memory Care Unit. Per Trixie, their facility does not provide transportation and pt's family is out of town. LSW completed transport request for [facility] and faxed to [facility] ([phone]).     Plan: Awaiting transport time.

## 2019-02-11 NOTE — DISCHARGE PLANNING
Received Transport Form @ 1120  Spoke to Daysi @ [facility]    Transport is scheduled for 2/11 @ 1230 going to [facility]. Lisa) notified of transport time via .

## 2019-02-11 NOTE — ED TRIAGE NOTES
Chief Complaint   Patient presents with   • Fall   • Facial Injury     Vitals:    02/11/19 0843   BP: 159/80   Pulse: 84   Resp: 16   Temp: 36.5 °C (97.7 °F)   SpO2: 97%     Pt had unwitnessed fall of unknown mechanism this morning at [facility]. Pt struck nose on bottom drawer of dresser, pt thinks he had +LOC. No complaints at this time. Bruising and swelling to nose.

## 2019-02-20 NOTE — ADDENDUM NOTE
Encounter addended by: Solange Romero M.D. on: 2/20/2019  2:01 PM<BR>    Actions taken: Sign clinical note, Charge Capture section accepted

## 2019-04-11 DIAGNOSIS — E78.5 HYPERLIPIDEMIA, UNSPECIFIED HYPERLIPIDEMIA TYPE: Primary | ICD-10-CM

## 2019-04-11 RX ORDER — ATORVASTATIN CALCIUM 40 MG/1
40 TABLET, FILM COATED ORAL EVERY EVENING
Qty: 90 TAB | Refills: 3 | Status: SHIPPED | OUTPATIENT
Start: 2019-04-11

## 2019-06-05 NOTE — PROGRESS NOTES
Bedside report given to Violeta PEREZ, POC discussed.   Epidermal Closure Graft Donor Site (Optional): simple interrupted

## 2019-07-31 ENCOUNTER — APPOINTMENT (OUTPATIENT)
Dept: RADIOLOGY | Facility: MEDICAL CENTER | Age: 84
DRG: 291 | End: 2019-07-31
Attending: EMERGENCY MEDICINE
Payer: MEDICARE

## 2019-07-31 ENCOUNTER — HOSPITAL ENCOUNTER (INPATIENT)
Facility: MEDICAL CENTER | Age: 84
LOS: 1 days | DRG: 291 | End: 2019-08-02
Attending: EMERGENCY MEDICINE | Admitting: HOSPITALIST
Payer: MEDICARE

## 2019-07-31 DIAGNOSIS — S09.90XA CLOSED HEAD INJURY, INITIAL ENCOUNTER: ICD-10-CM

## 2019-07-31 DIAGNOSIS — E87.70 HYPERVOLEMIA, UNSPECIFIED HYPERVOLEMIA TYPE: ICD-10-CM

## 2019-07-31 DIAGNOSIS — Z66 DNR (DO NOT RESUSCITATE): ICD-10-CM

## 2019-07-31 DIAGNOSIS — S01.81XA FACIAL LACERATION, INITIAL ENCOUNTER: ICD-10-CM

## 2019-07-31 DIAGNOSIS — T07.XXXA ABRASIONS OF MULTIPLE SITES: ICD-10-CM

## 2019-07-31 DIAGNOSIS — Z51.5 END OF LIFE CARE: ICD-10-CM
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
## 2019-07-31 DIAGNOSIS — R79.89 ELEVATED TROPONIN: ICD-10-CM

## 2019-07-31 PROBLEM — N17.9 ACUTE RENAL FAILURE SUPERIMPOSED ON STAGE 3 CHRONIC KIDNEY DISEASE (HCC): Status: ACTIVE | Noted: 2017-09-05
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
## 2019-07-31 PROBLEM — D69.6 THROMBOCYTOPENIA (HCC): Status: ACTIVE | Noted: 2019-07-31

## 2019-07-31 LAB
ALBUMIN SERPL BCP-MCNC: 2.8 G/DL (ref 3.2–4.9)
ALBUMIN SERPL BCP-MCNC: 2.8 G/DL (ref 3.2–4.9)
ALBUMIN/GLOB SERPL: 0.9 G/DL
ALBUMIN/GLOB SERPL: 0.9 G/DL
ALP SERPL-CCNC: 155 U/L (ref 30–99)
ALP SERPL-CCNC: 164 U/L (ref 30–99)
ALT SERPL-CCNC: 16 U/L (ref 2–50)
ALT SERPL-CCNC: 17 U/L (ref 2–50)
ANION GAP SERPL CALC-SCNC: 11 MMOL/L (ref 0–11.9)
ANION GAP SERPL CALC-SCNC: 8 MMOL/L (ref 0–11.9)
AST SERPL-CCNC: 21 U/L (ref 12–45)
AST SERPL-CCNC: 23 U/L (ref 12–45)
BASOPHILS # BLD AUTO: 1.5 % (ref 0–1.8)
BASOPHILS # BLD: 0.12 K/UL (ref 0–0.12)
BILIRUB SERPL-MCNC: 2 MG/DL (ref 0.1–1.5)
BILIRUB SERPL-MCNC: 2.4 MG/DL (ref 0.1–1.5)
BUN SERPL-MCNC: 34 MG/DL (ref 8–22)
BUN SERPL-MCNC: 35 MG/DL (ref 8–22)
CALCIUM SERPL-MCNC: 8.2 MG/DL (ref 8.4–10.2)
CALCIUM SERPL-MCNC: 8.3 MG/DL (ref 8.4–10.2)
CHLORIDE SERPL-SCNC: 111 MMOL/L (ref 96–112)
CHLORIDE SERPL-SCNC: 112 MMOL/L (ref 96–112)
CO2 SERPL-SCNC: 24 MMOL/L (ref 20–33)
CO2 SERPL-SCNC: 25 MMOL/L (ref 20–33)
CREAT SERPL-MCNC: 2.38 MG/DL (ref 0.5–1.4)
CREAT SERPL-MCNC: 2.39 MG/DL (ref 0.5–1.4)
EKG IMPRESSION: NORMAL
EOSINOPHIL # BLD AUTO: 0.59 K/UL (ref 0–0.51)
EOSINOPHIL NFR BLD: 7.2 % (ref 0–6.9)
ERYTHROCYTE [DISTWIDTH] IN BLOOD BY AUTOMATED COUNT: 59.2 FL (ref 35.9–50)
GLOBULIN SER CALC-MCNC: 3.1 G/DL (ref 1.9–3.5)
GLOBULIN SER CALC-MCNC: 3.2 G/DL (ref 1.9–3.5)
GLUCOSE SERPL-MCNC: 104 MG/DL (ref 65–99)
GLUCOSE SERPL-MCNC: 92 MG/DL (ref 65–99)
HCT VFR BLD AUTO: 39.4 % (ref 42–52)
HGB BLD-MCNC: 13 G/DL (ref 14–18)
IMM GRANULOCYTES # BLD AUTO: 0.03 K/UL (ref 0–0.11)
IMM GRANULOCYTES NFR BLD AUTO: 0.4 % (ref 0–0.9)
LYMPHOCYTES # BLD AUTO: 1.23 K/UL (ref 1–4.8)
LYMPHOCYTES NFR BLD: 15 % (ref 22–41)
MCH RBC QN AUTO: 33.2 PG (ref 27–33)
MCHC RBC AUTO-ENTMCNC: 33 G/DL (ref 33.7–35.3)
MCV RBC AUTO: 100.5 FL (ref 81.4–97.8)
MONOCYTES # BLD AUTO: 0.7 K/UL (ref 0–0.85)
MONOCYTES NFR BLD AUTO: 8.5 % (ref 0–13.4)
NEUTROPHILS # BLD AUTO: 5.53 K/UL (ref 1.82–7.42)
NEUTROPHILS NFR BLD: 67.4 % (ref 44–72)
NRBC # BLD AUTO: 0 K/UL
NRBC BLD-RTO: 0 /100 WBC
NT-PROBNP SERPL IA-MCNC: ABNORMAL PG/ML (ref 0–125)
NT-PROBNP SERPL IA-MCNC: ABNORMAL PG/ML (ref 0–125)
PLATELET # BLD AUTO: 98 K/UL (ref 164–446)
PMV BLD AUTO: 11.6 FL (ref 9–12.9)
POTASSIUM SERPL-SCNC: 3.9 MMOL/L (ref 3.6–5.5)
POTASSIUM SERPL-SCNC: 4 MMOL/L (ref 3.6–5.5)
PROT SERPL-MCNC: 5.9 G/DL (ref 6–8.2)
PROT SERPL-MCNC: 6 G/DL (ref 6–8.2)
RBC # BLD AUTO: 3.92 M/UL (ref 4.7–6.1)
SODIUM SERPL-SCNC: 144 MMOL/L (ref 135–145)
SODIUM SERPL-SCNC: 147 MMOL/L (ref 135–145)
TROPONIN T SERPL-MCNC: 62 NG/L (ref 6–19)
WBC # BLD AUTO: 8.2 K/UL (ref 4.8–10.8)

## 2019-07-31 PROCEDURE — 700111 HCHG RX REV CODE 636 W/ 250 OVERRIDE (IP): Performed by: HOSPITALIST

## 2019-07-31 PROCEDURE — 700102 HCHG RX REV CODE 250 W/ 637 OVERRIDE(OP): Performed by: HOSPITALIST

## 2019-07-31 PROCEDURE — 71045 X-RAY EXAM CHEST 1 VIEW: CPT

## 2019-07-31 PROCEDURE — 85025 COMPLETE CBC W/AUTO DIFF WBC: CPT

## 2019-07-31 PROCEDURE — 94760 N-INVAS EAR/PLS OXIMETRY 1: CPT

## 2019-07-31 PROCEDURE — 99285 EMERGENCY DEPT VISIT HI MDM: CPT

## 2019-07-31 PROCEDURE — 36415 COLL VENOUS BLD VENIPUNCTURE: CPT

## 2019-07-31 PROCEDURE — 99220 PR INITIAL OBSERVATION CARE,LEVL III: CPT | Mod: GW | Performed by: HOSPITALIST

## 2019-07-31 PROCEDURE — 304217 HCHG IRRIGATION SYSTEM

## 2019-07-31 PROCEDURE — 92610 EVALUATE SWALLOWING FUNCTION: CPT

## 2019-07-31 PROCEDURE — 304999 HCHG REPAIR-SIMPLE/INTERMED LEVEL 1

## 2019-07-31 PROCEDURE — 96376 TX/PRO/DX INJ SAME DRUG ADON: CPT

## 2019-07-31 PROCEDURE — 70486 CT MAXILLOFACIAL W/O DYE: CPT

## 2019-07-31 PROCEDURE — 700101 HCHG RX REV CODE 250

## 2019-07-31 PROCEDURE — 96374 THER/PROPH/DIAG INJ IV PUSH: CPT

## 2019-07-31 PROCEDURE — 93005 ELECTROCARDIOGRAM TRACING: CPT | Performed by: EMERGENCY MEDICINE

## 2019-07-31 PROCEDURE — 84484 ASSAY OF TROPONIN QUANT: CPT

## 2019-07-31 PROCEDURE — A9270 NON-COVERED ITEM OR SERVICE: HCPCS | Performed by: HOSPITALIST

## 2019-07-31 PROCEDURE — 96372 THER/PROPH/DIAG INJ SC/IM: CPT

## 2019-07-31 PROCEDURE — G0378 HOSPITAL OBSERVATION PER HR: HCPCS

## 2019-07-31 PROCEDURE — 700102 HCHG RX REV CODE 250 W/ 637 OVERRIDE(OP): Performed by: EMERGENCY MEDICINE

## 2019-07-31 PROCEDURE — 700111 HCHG RX REV CODE 636 W/ 250 OVERRIDE (IP): Performed by: EMERGENCY MEDICINE

## 2019-07-31 PROCEDURE — 303747 HCHG EXTRA SUTURE

## 2019-07-31 PROCEDURE — 70450 CT HEAD/BRAIN W/O DYE: CPT

## 2019-07-31 PROCEDURE — 72125 CT NECK SPINE W/O DYE: CPT

## 2019-07-31 PROCEDURE — A9270 NON-COVERED ITEM OR SERVICE: HCPCS | Performed by: EMERGENCY MEDICINE

## 2019-07-31 PROCEDURE — 83880 ASSAY OF NATRIURETIC PEPTIDE: CPT

## 2019-07-31 PROCEDURE — 80053 COMPREHEN METABOLIC PANEL: CPT

## 2019-07-31 RX ORDER — MEMANTINE HYDROCHLORIDE 10 MG/1
5 TABLET ORAL EVERY EVENING
Status: DISCONTINUED | OUTPATIENT
Start: 2019-07-31 | End: 2019-08-02 | Stop reason: HOSPADM

## 2019-07-31 RX ORDER — LORAZEPAM 0.5 MG/1
0.5 TABLET ORAL
Status: DISCONTINUED | OUTPATIENT
Start: 2019-07-31 | End: 2019-08-02 | Stop reason: HOSPADM

## 2019-07-31 RX ORDER — AMOXICILLIN 250 MG
2 CAPSULE ORAL 2 TIMES DAILY
Status: DISCONTINUED | OUTPATIENT
Start: 2019-07-31 | End: 2019-08-02 | Stop reason: HOSPADM

## 2019-07-31 RX ORDER — LIDOCAINE HYDROCHLORIDE AND EPINEPHRINE 10; 10 MG/ML; UG/ML
10 INJECTION, SOLUTION INFILTRATION; PERINEURAL ONCE
Status: ACTIVE | OUTPATIENT
Start: 2019-07-31 | End: 2019-08-01

## 2019-07-31 RX ORDER — FUROSEMIDE 10 MG/ML
20 INJECTION INTRAMUSCULAR; INTRAVENOUS ONCE
Status: COMPLETED | OUTPATIENT
Start: 2019-07-31 | End: 2019-07-31

## 2019-07-31 RX ORDER — IBUPROFEN 200 MG
400 TABLET ORAL 2 TIMES DAILY
COMMUNITY

## 2019-07-31 RX ORDER — LOSARTAN POTASSIUM 25 MG/1
25 TABLET ORAL EVERY MORNING
Status: DISCONTINUED | OUTPATIENT
Start: 2019-07-31 | End: 2019-08-02 | Stop reason: HOSPADM

## 2019-07-31 RX ORDER — OMEPRAZOLE 20 MG/1
20 CAPSULE, DELAYED RELEASE ORAL DAILY
Status: DISCONTINUED | OUTPATIENT
Start: 2019-07-31 | End: 2019-07-31

## 2019-07-31 RX ORDER — ESCITALOPRAM OXALATE 10 MG/1
10 TABLET ORAL DAILY
Status: DISCONTINUED | OUTPATIENT
Start: 2019-07-31 | End: 2019-08-02 | Stop reason: HOSPADM

## 2019-07-31 RX ORDER — FUROSEMIDE 10 MG/ML
40 INJECTION INTRAMUSCULAR; INTRAVENOUS
Status: DISCONTINUED | OUTPATIENT
Start: 2019-07-31 | End: 2019-08-02 | Stop reason: HOSPADM

## 2019-07-31 RX ORDER — LORAZEPAM 0.5 MG/1
0.5 TABLET ORAL EVERY 4 HOURS PRN
COMMUNITY

## 2019-07-31 RX ORDER — TIOTROPIUM BROMIDE 18 UG/1
1 CAPSULE ORAL; RESPIRATORY (INHALATION) DAILY
Status: DISCONTINUED | OUTPATIENT
Start: 2019-07-31 | End: 2019-08-02 | Stop reason: HOSPADM

## 2019-07-31 RX ORDER — ASPIRIN 325 MG
325 TABLET ORAL ONCE
Status: COMPLETED | OUTPATIENT
Start: 2019-07-31 | End: 2019-07-31

## 2019-07-31 RX ORDER — LABETALOL HYDROCHLORIDE 5 MG/ML
INJECTION, SOLUTION INTRAVENOUS
Status: COMPLETED
Start: 2019-07-31 | End: 2019-07-31

## 2019-07-31 RX ORDER — NITROGLYCERIN 0.4 MG/1
0.4 TABLET SUBLINGUAL ONCE
Status: COMPLETED | OUTPATIENT
Start: 2019-07-31 | End: 2019-07-31

## 2019-07-31 RX ORDER — POLYETHYLENE GLYCOL 3350 17 G/17G
1 POWDER, FOR SOLUTION ORAL
Status: DISCONTINUED | OUTPATIENT
Start: 2019-07-31 | End: 2019-08-02 | Stop reason: HOSPADM

## 2019-07-31 RX ORDER — BISACODYL 10 MG
10 SUPPOSITORY, RECTAL RECTAL
Status: DISCONTINUED | OUTPATIENT
Start: 2019-07-31 | End: 2019-08-02 | Stop reason: HOSPADM

## 2019-07-31 RX ORDER — MORPHINE SULFATE 20 MG/ML
5 SOLUTION ORAL
COMMUNITY

## 2019-07-31 RX ORDER — FUROSEMIDE 20 MG/1
20 TABLET ORAL DAILY
COMMUNITY
Start: 2019-07-25

## 2019-07-31 RX ORDER — ATORVASTATIN CALCIUM 40 MG/1
40 TABLET, FILM COATED ORAL EVERY EVENING
Status: DISCONTINUED | OUTPATIENT
Start: 2019-07-31 | End: 2019-08-02 | Stop reason: HOSPADM

## 2019-07-31 RX ORDER — LABETALOL HYDROCHLORIDE 5 MG/ML
10 INJECTION, SOLUTION INTRAVENOUS ONCE
Status: COMPLETED | OUTPATIENT
Start: 2019-07-31 | End: 2019-07-31

## 2019-07-31 RX ADMIN — LOSARTAN POTASSIUM 25 MG: 25 TABLET, FILM COATED ORAL at 17:08

## 2019-07-31 RX ADMIN — NITROGLYCERIN 0.4 MG: 0.4 TABLET, ORALLY DISINTEGRATING SUBLINGUAL at 10:15

## 2019-07-31 RX ADMIN — LABETALOL HYDROCHLORIDE 10 MG: 5 INJECTION INTRAVENOUS at 10:14

## 2019-07-31 RX ADMIN — ASPIRIN 325 MG ORAL TABLET 325 MG: 325 PILL ORAL at 10:14

## 2019-07-31 RX ADMIN — ENOXAPARIN SODIUM 40 MG: 100 INJECTION SUBCUTANEOUS at 17:05

## 2019-07-31 RX ADMIN — SENNOSIDES,DOCUSATE SODIUM 2 TABLET: 8.6; 5 TABLET, FILM COATED ORAL at 17:08

## 2019-07-31 RX ADMIN — FUROSEMIDE 20 MG: 10 INJECTION, SOLUTION INTRAMUSCULAR; INTRAVENOUS at 02:00

## 2019-07-31 RX ADMIN — LABETALOL HYDROCHLORIDE 10 MG: 5 INJECTION, SOLUTION INTRAVENOUS at 10:14

## 2019-07-31 RX ADMIN — FUROSEMIDE 40 MG: 10 INJECTION, SOLUTION INTRAMUSCULAR; INTRAVENOUS at 17:06

## 2019-07-31 ASSESSMENT — PATIENT HEALTH QUESTIONNAIRE - PHQ9
2. FEELING DOWN, DEPRESSED, IRRITABLE, OR HOPELESS: NOT AT ALL
SUM OF ALL RESPONSES TO PHQ9 QUESTIONS 1 AND 2: 0

## 2019-07-31 ASSESSMENT — LIFESTYLE VARIABLES: EVER_SMOKED: NEVER

## 2019-07-31 NOTE — ASSESSMENT & PLAN NOTE
I discussed the case with Dr. Nunez his son  He would like him comfortable enough to be off of oxygen  patient to return back to nursing home on hospice once stable

## 2019-07-31 NOTE — ASSESSMENT & PLAN NOTE
Blood pressure has been running in the 160s 170s.  Plan is to DC unnecessary medications when he returns back to assisted living with hospice   continue with losartan for now

## 2019-07-31 NOTE — THERAPY
Speech Language Therapy Clinical Swallow Evaluation completed.    Functional Status: Pt was seen at bedside for CSE. Per RN, Pt has been very somnolent today, and was observed to demonstrate bolus holding with water and have wet/gurgly vocal quality after the swallow. Upon arrival to Pt's room, Pt was found asleep in bed. Pt required max cues to open eyes and respond to this provider. Pt was unable to provide case hx information; however, RN indicated that Pt was receiving hospice services prior to this admission. Pt was unable to follow directives [facility]. Pt was provided with PO trials of thins (tsps, cup sips), NTL (tsps) and puree (tsps). Pt demonstrated inconsistent ability to remain awake/alert throughout session - and would alternate between being awake/alert and conversing to being obtunded, which increases Pt's risk of aspiration. PO trials were provided by SLP.     Pt demonstrated inconsistent performance with tsps of thins (bolus holding vs. delayed swallow vs. timely swallow) however, no coughing/choking and/or change in vocal quality was noted. Pt was provided with cup sip of thins, and demonstrated overt coughing after the swallow, which is concerning for possible penetration/aspiration. Pt demonstrated improved tolerance with NTL; however, was unable to participate with more than x2 sips secondary to falling asleep. Pt then awoke again was then provided with tsps of puree, and again demonstrated bolus holding, requiring max cues to swallow. PO trials were discontinued at this time due to Pt's fluctuating levels of alertness. At this time, Pt appears at high risk for aspiration/penetration. Recommend strict adherence to safe swallow precautions, 1:1 feeding/supervision and modified PO diet of Dysphagia 1 solids, Nectar-thick liquids and meds crushed in puree. Pt MUST be awake/alert for PO intake. If Pt does not tolerate diet, recommend NPO status and consideration of artificial  "nutrition/hydration via feeding tube (okay per most recent POLST/Advanced Directive.) Will defer to MD for most appropriate POC. RN aware. SLP following. Thank you for the consult.    Recommendations - Diet: Dysphagia I, Nectar Thick Liquid; Ok for sips of thin water b/t meals, as tolerated                          Strategies: Strict 1:1 feeding, Direct supervision during meals, Assistance needed for meal tray set-up and Head of Bed at 90 Degrees                          Medication Administration: Crush all Medications in Puree    Plan of Care: Will benefit from Speech Therapy 3 times per week  Post-Acute Therapy: Recommend inpatient transitional care services for continued speech therapy services.      See \"Rehab Therapy-Acute\" Patient Summary Report for complete documentation.   "

## 2019-07-31 NOTE — PROGRESS NOTES
· 2 RN skin check complete with ANA Vera.  · Skin assessment: Bruise R eye, stitches on side of eye, bruised nose, bruised R hip, R elbow abrasion, L knee abrasion  · Devices in place: telemetry monitor, IV, briefs  · Skin assessed under devices yes  · Confirmed pressure ulcers found on n/a  · New potential pressure ulcers noted on n/a.   · The following interventions in place: wound protocols in place, pillows used for positioning.

## 2019-07-31 NOTE — ED PROVIDER NOTES
ED Provider Note  CHIEF COMPLAINT  Chief Complaint   Patient presents with   • GLF   • Facial Laceration       HPI  Jie Nunez is a 89 y.o. male who presents from nursing home where he is on hospice.  Over the last week or so patient has had multiple falls.  He fell again last night and sustained an avulsion type laceration under his left eye.  He also has bruising to his face and nasal swelling and discomfort.  The patient is a poor historian.  He tells me he does not have any pain anywhere.  He answers simple questions and follows commands.  The patient's son, who is a gastroenterologist here in town, called me prior to patient arrival to give me some of his history.  He is concerned about the laceration as it continues to ooze.  The hospice nurse tried to place some Steri-Strips on it but with the persistent bleeding the Steri-Strips are not really working.  He would like the patient checked out here in the emergency department to deal with not only a laceration, but evaluate for any other injuries.  EMS reports that patient's Lasix dose was recently decreased.  Patient denies shortness of breath but he appears to be wheezing upon MD arrival.    REVIEW OF SYSTEMS  See HPI for further details.  ROS unable to be obtained due to patient being poor historian and not being very communicative.  All other systems are negative.    PAST MEDICAL HISTORY  Past Medical History:   Diagnosis Date   • Arrhythmia    • Atrial fibrillation (HCC)    • Cancer (HCC)     prostate   • Congestive heart failure (HCC)    • Hypertension        FAMILY HISTORY  Family History   Problem Relation Age of Onset   • Heart Disease Neg Hx    • Heart Failure Neg Hx        SOCIAL HISTORY  Social History     Socioeconomic History   • Marital status: Single     Spouse name: Not on file   • Number of children: Not on file   • Years of education: Not on file   • Highest education level: Not on file   Occupational History   • Not on file   Social  Needs   • Financial resource strain: Not on file   • Food insecurity:     Worry: Not on file     Inability: Not on file   • Transportation needs:     Medical: Not on file     Non-medical: Not on file   Tobacco Use   • Smoking status: Never Smoker   • Smokeless tobacco: Never Used   Substance and Sexual Activity   • Alcohol use: No   • Drug use: No   • Sexual activity: Not on file   Lifestyle   • Physical activity:     Days per week: Not on file     Minutes per session: Not on file   • Stress: Not on file   Relationships   • Social connections:     Talks on phone: Not on file     Gets together: Not on file     Attends [employer] service: Not on file     Active member of club or organization: Not on file     Attends meetings of clubs or organizations: Not on file     Relationship status: Not on file   • Intimate partner violence:     Fear of current or ex partner: Not on file     Emotionally abused: Not on file     Physically abused: Not on file     Forced sexual activity: Not on file   Other Topics Concern   • Not on file   Social History Narrative    ** Merged History Encounter **            SURGICAL HISTORY  Past Surgical History:   Procedure Laterality Date   • CHOLECYSTECTOMY     • GYN SURGERY      leyda   • LUMBAR FUSION POSTERIOR     • OTHER CARDIAC SURGERY      A-fib   • OTHER CARDIAC SURGERY      chf   • OTHER ORTHOPEDIC SURGERY      lumbar lami   • SHOULDER ARTHROSCOPY         CURRENT MEDICATIONS  Home Medications    **Home medications have not yet been reviewed for this encounter**         ALLERGIES  Allergies   Allergen Reactions   • Beta Adrenergic Blockers Anaphylaxis     HR goes into teens, Cardiology wants him to never have another beta blocker   • Penicillins      Unknown reaction         PHYSICAL EXAM  VITAL SIGNS: BP (!) 216/102 Comment: RN notified.  Pulse 84   Temp 36.3 °C (97.3 °F) (Temporal)   Resp (!) 22   Wt 68 kg (149 lb 14.6 oz)   SpO2 97%   BMI 23.48 kg/m²    Constitutional: Well  developed, well nourished; No acute distress; Non-toxic appearance.   HENT: Normocephalic, atraumatic; Bilateral external ears normal; dry mucous membranes.  Ecchymosis under bilateral eyes.  Nasal ecchymosis.  There is some ecchymosis to the right ear without auricular hematoma.  There is some ecchymosis to the right temporal region.  There is a 2 cm V-shaped avulsion of the thin tissues of the patient's skin just under his left eye.  There is no lower eyelid involvement.  No subconjunctival hemorrhage.  Extra ocular movements are intact.  No raccoons or pool's.  Eyes: PERRL, EOMI, Conjunctiva normal. No discharge.   Neck:  Supple, nontender midline; No stridor; No nuchal rigidity.   Lymphatic: No cervical lymphadenopathy noted.   Cardiovascular: Regular rate and rhythm without murmurs, rubs, or gallop.   Thorax & Lungs: No respiratory distress, breath sounds clear to auscultation bilaterally without wheezing, rales or rhonchi. Nontender chest wall. No crepitus or subcutaneous air  Abdomen: Soft, nontender, bowel sounds normal. No obvious masses; No pulsatile masses; no rebound, guarding, or peritoneal signs.   Skin: Good color; warm and dry without rash or petechia.  Back: Nontender, No CVA tenderness.   Extremities: Distal dorsalis pedis, posterior tibial pulses are equal bilaterally; ; Nontender calves or saphenous, No cyanosis, No clubbing.  No pain with range of motion of either hip.  No shortening or rotation.  3+ pitting edema to bilateral lower extremities.  Musculoskeletal: Good range of motion in all major joints. No tenderness to palpation or major deformities noted.   Neurologic: Alert & and awake but somnolent.  Open eyes to voice.  Follows simple commands.  Answers yes no to some simple questions.  Moves all extremities.    EKG: EKG timed 848.  Atrial fibrillation.  Rate of 61.  Left axis deviation.  Pacemaker spikes present.  Q waves inferiorly.  Poor R wave progression.  No obvious ST elevation or  depression.  T wave flat throughout the limb leads.    Unable to document EKG and epiphany as epiphany is not working with the new epic upgrade.    RADIOLOGY/PROCEDURES  CT-CSPINE WITHOUT PLUS RECONS   Final Result      1.  No evidence of cervical spine fracture.      2.  Multilevel degenerative disc disease and facet degeneration.      3.  Osteopenia.      CT-MAXILLOFACIAL W/O PLUS RECONS   Final Result      1.  Again seen comminuted and impacted nasal fracture which was present on the prior examination.      2.  No evidence of acute fracture.      CT-HEAD W/O   Final Result      1.  No evidence of acute intracranial process.      2.  Cerebral atrophy as well as periventricular chronic small vessel ischemic change.      DX-CHEST-PORTABLE (1 VIEW)   Final Result      1.  Bibasilar atelectasis and interstitial prominence.      2.  Cardiomegaly.          Laceration repair: Timeout was performed.  The laceration is just under the left eye.  Wound was anesthetized using lidocaine 1% with epinephrine.  Wound was irrigated, cleansed and explored.  No deep structure involvement.  The wound does not involve the lower eyelid or the lateral canthus.  Wound was closed using six-point 0 nylon on a P1 needle.  Patient tolerated procedure well.  Suture removal will need to be in 5 days.    COURSE & MEDICAL DECISION MAKING  Pertinent Labs & Imaging studies reviewed. (See chart for details)  Results for orders placed or performed during the hospital encounter of 07/31/19   CBC WITH DIFFERENTIAL   Result Value Ref Range    WBC 8.2 4.8 - 10.8 K/uL    RBC 3.92 (L) 4.70 - 6.10 M/uL    Hemoglobin 13.0 (L) 14.0 - 18.0 g/dL    Hematocrit 39.4 (L) 42.0 - 52.0 %    .5 (H) 81.4 - 97.8 fL    MCH 33.2 (H) 27.0 - 33.0 pg    MCHC 33.0 (L) 33.7 - 35.3 g/dL    RDW 59.2 (H) 35.9 - 50.0 fL    Platelet Count 98 (L) 164 - 446 K/uL    MPV 11.6 9.0 - 12.9 fL    Neutrophils-Polys 67.40 44.00 - 72.00 %    Lymphocytes 15.00 (L) 22.00 - 41.00 %     Monocytes 8.50 0.00 - 13.40 %    Eosinophils 7.20 (H) 0.00 - 6.90 %    Basophils 1.50 0.00 - 1.80 %    Immature Granulocytes 0.40 0.00 - 0.90 %    Nucleated RBC 0.00 /100 WBC    Neutrophils (Absolute) 5.53 1.82 - 7.42 K/uL    Lymphs (Absolute) 1.23 1.00 - 4.80 K/uL    Monos (Absolute) 0.70 0.00 - 0.85 K/uL    Eos (Absolute) 0.59 (H) 0.00 - 0.51 K/uL    Baso (Absolute) 0.12 0.00 - 0.12 K/uL    Immature Granulocytes (abs) 0.03 0.00 - 0.11 K/uL    NRBC (Absolute) 0.00 K/uL   COMP METABOLIC PANEL   Result Value Ref Range    Sodium 144 135 - 145 mmol/L    Potassium 3.9 3.6 - 5.5 mmol/L    Chloride 111 96 - 112 mmol/L    Co2 25 20 - 33 mmol/L    Anion Gap 8.0 0.0 - 11.9    Glucose 92 65 - 99 mg/dL    Bun 34 (H) 8 - 22 mg/dL    Creatinine 2.39 (H) 0.50 - 1.40 mg/dL    Calcium 8.2 (L) 8.4 - 10.2 mg/dL    AST(SGOT) 23 12 - 45 U/L    ALT(SGPT) 16 2 - 50 U/L    Alkaline Phosphatase 164 (H) 30 - 99 U/L    Total Bilirubin 2.0 (H) 0.1 - 1.5 mg/dL    Albumin 2.8 (L) 3.2 - 4.9 g/dL    Total Protein 5.9 (L) 6.0 - 8.2 g/dL    Globulin 3.1 1.9 - 3.5 g/dL    A-G Ratio 0.9 g/dL   proBrain Natriuretic Peptide, NT   Result Value Ref Range    NT-proBNP 10546 (H) 0 - 125 pg/mL   TROPONIN   Result Value Ref Range    Troponin T 62 (H) 6 - 19 ng/L   ESTIMATED GFR   Result Value Ref Range    GFR If  31 (A) >60 mL/min/1.73 m 2    GFR If Non African American 26 (A) >60 mL/min/1.73 m 2   EKG (NOW)   Result Value Ref Range    Report       [facility] Emergency [facility].    Test Date:  2019  Pt Name:    JAI ROJAS               Department: EDSM  MRN:        [id number]                      Room:       -ROOM 5  Gender:     Male                         Technician: DELIA  :        [id number]                   Requested By:RED SMALL  Order #:    [id number]                    Reading MD:    Measurements  Intervals                                Axis  Rate:       61                           P:  DC:                                       QRS:        -52  QRSD:       134                          T:          -50  QT:         512  QTc:        516    Interpretive Statements  AFIB/FLUT AND V-PACED COMPLEXES  NO FURTHER RHYTHM ANALYSIS ATTEMPTED DUE TO PACED RHYTHM  RBBB AND LAFB  Compared to ECG 02/11/2019 09:01:35  Left anterior fascicular block now present  Right bundle-branch block now present  Atrial-paced complex(es) or rhythm no longer present  Intraventricular conduction delay no longer present  M yocardial infarct finding no longer present  ST (T wave) deviation no longer present       Patient presents to the ER from a [facility] care center after multiple falls over the last 4 to 5 days.  Family states that patient has fallen about 6 times in the last 4 to 5 days.  He is here today because he has a laceration just below his left eye which continues to ooze blood despite the hospice nurse trying to Steri-Strip it shut.  Patient has ecchymosis under bilateral eyes.  His nose is ecchymotic.  He has a V-shaped laceration just under and lateral to his left eye.  The laceration approaches the eyelid in the lateral canthus but does not involve it.  The conjunctive is clear.  There is no subconjunctival hemorrhage.  Extra ocular movements are intact.  Facial CT shows comminuted nasal bone fractures which was seen on previous CT scan.  No new.  No other facial fractures.  CT brain is negative.  CT C-spine is negative.  Patient has history of heart failure.  Family and hospice decided to take the patient off of his Lasix a few days ago because he thought that might be causing to fall.  I think as a consequence he is developed some increased fluid overload.  He has increased interstitial markings on his chest x-ray.  He has some audible wheezes.  He is not hypoxic and is not in any respiratory distress however.  He does have significant 3+ pitting edema bilateral lower extreme is.  His BNP is markedly elevated over 12,000.  Troponin  is a little bit high as well.  EKG shows A. fib without any ST elevation or depression.  I think his minimal troponin elevation is due to some heart strain as result of the heart failure.  I gave him some Lasix, and aspirin, and some nitroglycerin to try to offload him a little bit.  I spoke with the patient's son, who is a gastroenterologist in [facility].  He feels its best to take the patient off of hospice at this time, treat his CHF, and then once he is more comfortable and medically managed a little bit better put him back on the hospice.  The patient's wound was closed here in the emergency department.  His skin is very thin but I was able to approximate the margins reasonably well.  Suture removal be in 5 days.  The patient tolerated the procedure well.  At this time patient will be admitted to the hospital service under the care of Dr. Ibarra.       FINAL IMPRESSION  1. Facial laceration, initial encounter Acute    2. Closed head injury, initial encounter Acute    3. Fall from ground level Acute    4. Abrasions of multiple sites Acute    5. Hypervolemia, unspecified hypervolemia type Acute    6. Elevated troponin Acute           This dictation has been created using voice recognition software. The accuracy of the dictation is limited by the abilities of the software. I expect there may be some errors of grammar and possibly content. I made every attempt to manually correct the errors within my dictation. However, errors related to voice recognition software may still exist and should be interpreted within the appropriate context.  Electronically signed by: Sabrina Baez, 7/31/2019 8:16 AM

## 2019-07-31 NOTE — PROGRESS NOTES
Assumed pt care. AO to self. VSS.  Pt appears comfortable at this time.  Discussed POC.  No evidence of learning.  Hourly rounding in place. Fall precautions in place and call lights w/in reach.  Will continue to monitor.

## 2019-07-31 NOTE — ASSESSMENT & PLAN NOTE
Multifactorial, including low flow state and overload  Slight improvement with diuresis  IV Lasix higher dose initiated, continue to monitor renal functions closely  Avoid nephrotoxins such as NSAIDs

## 2019-07-31 NOTE — CARE PLAN
Problem: Safety  Goal: Will remain free from injury  7/31/2019 1438 by Hollie Cloud R.N.  Outcome: PROGRESSING AS EXPECTED  7/31/2019 1432 by Hollie Cloud R.N.  Outcome: PROGRESSING AS EXPECTED  Goal: Will remain free from falls  7/31/2019 1438 by Hollie Cloud R.N.  Outcome: PROGRESSING AS EXPECTED  7/31/2019 1432 by Hollie Cloud R.N.  Outcome: PROGRESSING AS EXPECTED  Intervention: Implement fall precautions  Flowsheets (Taken 7/31/2019 1432)  Bed Alarm: Yes - Alarm On  Environmental Precautions: Treaded Slipper Socks on Patient;Personal Belongings, Wastebasket, Call Bell etc. in Easy Reach;Transferred to Stronger Side;Report Given to Other Health Care Providers Regarding Fall Risk;Bed in Low Position;Communication Sign for Patients & Families;Mobility Assessed & Appropriate Sign Placed  Bedrails: Bedrails Closest to Bathroom Down  Chair/Bed Strip Alarm: Yes - Alarm On     Problem: Infection  Goal: Will remain free from infection  7/31/2019 1438 by Hollie Cloud R.N.  Outcome: PROGRESSING AS EXPECTED  7/31/2019 1432 by Hollie Cloud R.N.  Outcome: PROGRESSING AS EXPECTED

## 2019-07-31 NOTE — ED NOTES
1005 All results back, chart up for MD for re evaluation. poc update given to pt. No further questions at this time. Further orders and dispo pending  1019Pt medicated as directed by ER md, poc update given to pt. Further orders and dispo pending at this time. No further questions from pt at this time

## 2019-07-31 NOTE — ED TRIAGE NOTES
Pt to ed , pt had glf this morning onto carpet. Laceration to below left eye.  Pt neuro at baseline

## 2019-07-31 NOTE — H&P
Hospital Medicine History & Physical Note    Date of Service  7/31/2019    Primary Care Physician  Thelma Cervantes M.D.    Consultants  None    Code Status  DNAR/DNI    Chief Complaint  Chief Complaint   Patient presents with   • GLF   • Facial Laceration       History of Presenting Illness  Mayra is a very pleasant 89 y.o. male with a past medical history of Dementia, Pulmonary hypertension, history of prostate cancer, atrial fibrillation, and nursing home on hospice, presented to the emergency room on 7/31/2019 patient had multiple falls which she has had over the past week.  However today he sustained another mechanical ground-level fall and sustained an avulsion type laceration under his left eye as well as multiple areas of bruising around his face and nose.  In addition patient was noted to be short of breath and struggling.  Apparently from notes appears that his Lasix dose was recently decreased.  Although patient is a poor historian and is confused.  He is the father of gastroenterology Dr. Fabian Weldon.  ERP did discuss case with him who really wished for us to stabilize him make him feel better in terms of getting fluid out of him, addressing his lacerations, and then sending him back to his nursing home.    Review of Systems  Review of Systems   Unable to perform ROS: Dementia       Past Medical History  Past Medical History:   Diagnosis Date   • Arrhythmia    • Atrial fibrillation (HCC)    • Cancer (HCC)     prostate   • Congestive heart failure (HCC)    • Hypertension        Surgical History   has a past surgical history that includes lumbar fusion posterior; cholecystectomy; gyn surgery; other orthopedic surgery; shoulder arthroscopy; other cardiac surgery; and other cardiac surgery.    Family History  Unable to obtain    Social History   reports that he has never smoked. He has never used smokeless tobacco. He reports that he does not drink alcohol or use drugs.    Allergies  Allergies    Allergen Reactions   • Beta Adrenergic Blockers Anaphylaxis     HR goes into teens, Cardiology wants him to never have another beta blocker   • Penicillins      Unknown reaction         Medications  Prior to Admission medications    Medication Sig Start Date End Date Taking? Authorizing Provider   Menthol-Zinc Oxide (REMEDY CALAZIME EX) 1 Application by Apply externally route 2 Times a Day.   Yes Physician Outpatient   furosemide (LASIX) 20 MG Tab Take 20 mg by mouth every day. 3 DAY COURSE 7/25/19  Yes Physician Outpatient   ibuprofen (MOTRIN) 200 MG Tab Take 400 mg by mouth 2 Times a Day.   Yes Physician Outpatient   LORazepam (ATIVAN) 0.5 MG Tab Take 0.5 mg by mouth every four hours as needed for Anxiety.   Yes Physician Outpatient   morphine 100 MG/5ML Solution Take 5 mg by mouth every 1 hour as needed (PAIN).   Yes Physician Outpatient   atorvastatin (LIPITOR) 40 MG Tab Take 1 Tab by mouth every evening. 4/11/19   Daniele Rebolledo M.D.   aspirin EC (ECOTRIN) 81 MG Tablet Delayed Response Take 81 mg by mouth every day.    Physician Outpatient   ascorbic acid (ASCORBIC ACID) 500 MG Tab Take 500 mg by mouth every day.    Physician Outpatient   losartan (COZAAR) 25 MG Tab Take 25 mg by mouth every morning.    Physician Outpatient   escitalopram (LEXAPRO) 10 MG Tab Take 10 mg by mouth every day.    Physician Outpatient   omeprazole (PRILOSEC) 20 MG delayed-release capsule Take 20 mg by mouth every day.    Physician Outpatient   memantine (NAMENDA) 5 MG Tab Take 5 mg by mouth every evening.    Physician Outpatient   tiotropium (SPIRIVA) 18 MCG Cap Inhale 18 mcg by mouth every day.    Physician Outpatient   Cholecalciferol (VITAMIN D PO) Take 50,000 Units by mouth every Friday.    Physician Outpatient       Physical Exam  Temp:  [36.3 °C (97.3 °F)] 36.3 °C (97.3 °F)  Pulse:  [60-84] 60  Resp:  [16-31] 22  BP: (147-216)/() 176/79  SpO2:  [85 %-99 %] 96 %  Physical Exam   Constitutional: He appears  well-developed and well-nourished.   HENT:   Head: Normocephalic.   Mouth/Throat: No oropharyngeal exudate.   Mucous membranes dry  Ecchymosis noted under bilateral eyes with ecchymosis under right auricular area, right temporal area, with a small laceration/avulsion about 2 cm under left eye   Eyes: Pupils are equal, round, and reactive to light. Conjunctivae are normal. Right eye exhibits no discharge. No scleral icterus.   Neck: Neck supple. No JVD present. No thyromegaly present.   Cardiovascular: Intact distal pulses.   No murmur heard.  Pulses:       Dorsalis pedis pulses are 2+ on the right side, and 2+ on the left side.   Cap refill < 3 s   Pulmonary/Chest: Effort normal and breath sounds normal. No stridor. No respiratory distress. He has no wheezes. He has no rales.   Abdominal: Soft. Bowel sounds are normal. He exhibits no distension. There is no tenderness. There is no rebound.   Musculoskeletal: Normal range of motion. He exhibits edema.   +3 pitting edema bilateral lower extremities   Neurological: He is alert. No cranial nerve deficit.   Alert and confused, follows simple commands   Skin: Skin is warm and dry. He is not diaphoretic. No erythema.   Psychiatric: He has a normal mood and affect. His behavior is normal. Thought content normal.   Nursing note and vitals reviewed.      Laboratory:  Recent Labs     07/31/19  0841   WBC 8.2   RBC 3.92*   HEMOGLOBIN 13.0*   HEMATOCRIT 39.4*   .5*   MCH 33.2*   MCHC 33.0*   RDW 59.2*   PLATELETCT 98*   MPV 11.6     Recent Labs     07/31/19  0841   SODIUM 144   POTASSIUM 3.9   CHLORIDE 111   CO2 25   GLUCOSE 92   BUN 34*   CREATININE 2.39*   CALCIUM 8.2*     Recent Labs     07/31/19  0841   ALTSGPT 16   ASTSGOT 23   ALKPHOSPHAT 164*   TBILIRUBIN 2.0*   GLUCOSE 92               Urinalysis:          Imaging:  CT-CSPINE WITHOUT PLUS RECONS   Final Result      1.  No evidence of cervical spine fracture.      2.  Multilevel degenerative disc disease and facet  degeneration.      3.  Osteopenia.      CT-MAXILLOFACIAL W/O PLUS RECONS   Final Result      1.  Again seen comminuted and impacted nasal fracture which was present on the prior examination.      2.  No evidence of acute fracture.      CT-HEAD W/O   Final Result      1.  No evidence of acute intracranial process.      2.  Cerebral atrophy as well as periventricular chronic small vessel ischemic change.      DX-CHEST-PORTABLE (1 VIEW)   Final Result      1.  Bibasilar atelectasis and interstitial prominence.      2.  Cardiomegaly.          Assessment/Plan:  I anticipate this patient is appropriate for observation status at this time.    * Acute diastolic heart failure (HCC)  Assessment & Plan  Last echocardiogram was in 2017 which showed preserved LVEF function however patient did have pulmonary hypertension  We will increase his dose of Lasix to IV 40 mg twice daily  Strict I/Os.      Thrombocytopenia (HCC)  Assessment & Plan  Platelets 98,000  Continue to monitor    Elevated troponin  Assessment & Plan  Elevated troponins at 62  Most likely due to demand ischemia, patient is a poor historian  EKG does not show any evidence of acute ischemia  Continue to trend serial troponins to make sure that this decreases    Fall  Assessment & Plan  Most likely due to deconditioning  PT and OT ordered  Fall precautions ordered    Pulmonary hypertension (HCC)- (present on admission)  Assessment & Plan  Prior echocardiogram finding, resume Lasix    End of life care- (present on admission)  Assessment & Plan  Patient to return back to nursing home on hospice once stable    Acute renal failure superimposed on stage 3 chronic kidney disease (HCC)- (present on admission)  Assessment & Plan  Multifactorial, including low flow state  IV Lasix higher dose initiated, continue to monitor renal functions closely  Avoid nephrotoxins such as NSAIDs    GERD (gastroesophageal reflux disease)- (present on admission)  Assessment & Plan  Continue  with omeprazole    Dementia- (present on admission)  Assessment & Plan  Baseline  Resume memantine    Essential hypertension- (present on admission)  Assessment & Plan  Continue with losartan.    Persistent atrial fibrillation (HCC)- (present on admission)  Assessment & Plan  Chronic rate controlled  Not on Anticoagulation  Resume aspirin.       VTE prophylaxis: Prophylaxis: SCDs

## 2019-07-31 NOTE — ASSESSMENT & PLAN NOTE
Last echocardiogram was in 2017 which showed preserved LVEF function however patient did have pulmonary hypertension  Increase Lasix to IV 40 mg twice daily, continue for 24 more hrs  Strict I/Os.

## 2019-07-31 NOTE — ASSESSMENT & PLAN NOTE
Most likely due to deconditioning  He has a hospital bed arranged for him at the assisted living facility and soon will have a nighttime caregiver to prevent falls out of bed at night

## 2019-08-01 ENCOUNTER — PATIENT OUTREACH (OUTPATIENT)
Dept: HEALTH INFORMATION MANAGEMENT | Facility: OTHER | Age: 84
End: 2019-08-01

## 2019-08-01 LAB
ALBUMIN SERPL BCP-MCNC: 2.6 G/DL (ref 3.2–4.9)
ALBUMIN/GLOB SERPL: 0.9 G/DL
ALP SERPL-CCNC: 147 U/L (ref 30–99)
ALT SERPL-CCNC: 15 U/L (ref 2–50)
ANION GAP SERPL CALC-SCNC: 11 MMOL/L (ref 0–11.9)
ANISOCYTOSIS BLD QL SMEAR: ABNORMAL
APPEARANCE UR: CLEAR
AST SERPL-CCNC: 19 U/L (ref 12–45)
BACTERIA #/AREA URNS HPF: NEGATIVE /HPF
BASOPHILS # BLD AUTO: 1.6 % (ref 0–1.8)
BASOPHILS # BLD: 0.16 K/UL (ref 0–0.12)
BILIRUB SERPL-MCNC: 2.3 MG/DL (ref 0.1–1.5)
BILIRUB UR QL STRIP.AUTO: NEGATIVE
BUN SERPL-MCNC: 35 MG/DL (ref 8–22)
BURR CELLS BLD QL SMEAR: NORMAL
CALCIUM SERPL-MCNC: 8.1 MG/DL (ref 8.4–10.2)
CHLORIDE SERPL-SCNC: 109 MMOL/L (ref 96–112)
CO2 SERPL-SCNC: 25 MMOL/L (ref 20–33)
COLOR UR: YELLOW
CREAT SERPL-MCNC: 2.34 MG/DL (ref 0.5–1.4)
EOSINOPHIL # BLD AUTO: 0.47 K/UL (ref 0–0.51)
EOSINOPHIL NFR BLD: 4.8 % (ref 0–6.9)
EPI CELLS #/AREA URNS HPF: ABNORMAL /HPF
ERYTHROCYTE [DISTWIDTH] IN BLOOD BY AUTOMATED COUNT: 64.2 FL (ref 35.9–50)
GLOBULIN SER CALC-MCNC: 2.8 G/DL (ref 1.9–3.5)
GLUCOSE SERPL-MCNC: 88 MG/DL (ref 65–99)
GLUCOSE UR STRIP.AUTO-MCNC: NEGATIVE MG/DL
HCT VFR BLD AUTO: 41.6 % (ref 42–52)
HGB BLD-MCNC: 12.5 G/DL (ref 14–18)
IMM GRANULOCYTES # BLD AUTO: 0.04 K/UL (ref 0–0.11)
IMM GRANULOCYTES NFR BLD AUTO: 0.4 % (ref 0–0.9)
KETONES UR STRIP.AUTO-MCNC: NEGATIVE MG/DL
LEUKOCYTE ESTERASE UR QL STRIP.AUTO: ABNORMAL
LYMPHOCYTES # BLD AUTO: 1.27 K/UL (ref 1–4.8)
LYMPHOCYTES NFR BLD: 12.9 % (ref 22–41)
MACROCYTES BLD QL SMEAR: ABNORMAL
MCH RBC QN AUTO: 33.2 PG (ref 27–33)
MCHC RBC AUTO-ENTMCNC: 30 G/DL (ref 33.7–35.3)
MCV RBC AUTO: 110.6 FL (ref 81.4–97.8)
MICRO URNS: ABNORMAL
MONOCYTES # BLD AUTO: 0.82 K/UL (ref 0–0.85)
MONOCYTES NFR BLD AUTO: 8.3 % (ref 0–13.4)
NEUTROPHILS NFR BLD: 72 % (ref 44–72)
NITRITE UR QL STRIP.AUTO: NEGATIVE
NRBC # BLD AUTO: 0 K/UL
NRBC BLD-RTO: 0 /100 WBC
OVALOCYTES BLD QL SMEAR: NORMAL
PH UR STRIP.AUTO: 6.5 [PH] (ref 5–8)
PLATELET # BLD AUTO: 98 K/UL (ref 164–446)
PLATELET BLD QL SMEAR: NORMAL
PMV BLD AUTO: 11.6 FL (ref 9–12.9)
POIKILOCYTOSIS BLD QL SMEAR: NORMAL
POTASSIUM SERPL-SCNC: 3.6 MMOL/L (ref 3.6–5.5)
PROT SERPL-MCNC: 5.4 G/DL (ref 6–8.2)
PROT UR QL STRIP: 30 MG/DL
RBC # BLD AUTO: 3.76 M/UL (ref 4.7–6.1)
RBC # URNS HPF: ABNORMAL /HPF
RBC BLD AUTO: PRESENT
RBC UR QL AUTO: ABNORMAL
SODIUM SERPL-SCNC: 145 MMOL/L (ref 135–145)
SP GR UR STRIP.AUTO: 1.01
WBC # BLD AUTO: 9.8 K/UL (ref 4.8–10.8)
WBC #/AREA URNS HPF: ABNORMAL /HPF

## 2019-08-01 PROCEDURE — 770020 HCHG ROOM/CARE - TELE (206)

## 2019-08-01 PROCEDURE — 80053 COMPREHEN METABOLIC PANEL: CPT

## 2019-08-01 PROCEDURE — 96372 THER/PROPH/DIAG INJ SC/IM: CPT

## 2019-08-01 PROCEDURE — 96376 TX/PRO/DX INJ SAME DRUG ADON: CPT

## 2019-08-01 PROCEDURE — 87086 URINE CULTURE/COLONY COUNT: CPT

## 2019-08-01 PROCEDURE — 700111 HCHG RX REV CODE 636 W/ 250 OVERRIDE (IP): Performed by: HOSPITALIST

## 2019-08-01 PROCEDURE — 85025 COMPLETE CBC W/AUTO DIFF WBC: CPT

## 2019-08-01 PROCEDURE — 81001 URINALYSIS AUTO W/SCOPE: CPT

## 2019-08-01 PROCEDURE — A9270 NON-COVERED ITEM OR SERVICE: HCPCS | Performed by: HOSPITALIST

## 2019-08-01 PROCEDURE — 92526 ORAL FUNCTION THERAPY: CPT

## 2019-08-01 PROCEDURE — 99232 SBSQ HOSP IP/OBS MODERATE 35: CPT | Mod: GV | Performed by: INTERNAL MEDICINE

## 2019-08-01 PROCEDURE — 700102 HCHG RX REV CODE 250 W/ 637 OVERRIDE(OP): Performed by: HOSPITALIST

## 2019-08-01 RX ADMIN — SENNOSIDES,DOCUSATE SODIUM 2 TABLET: 8.6; 5 TABLET, FILM COATED ORAL at 06:13

## 2019-08-01 RX ADMIN — ENOXAPARIN SODIUM 40 MG: 100 INJECTION SUBCUTANEOUS at 06:13

## 2019-08-01 RX ADMIN — MEMANTINE HYDROCHLORIDE 5 MG: 10 TABLET ORAL at 18:24

## 2019-08-01 RX ADMIN — ASPIRIN 81 MG: 81 TABLET ORAL at 06:15

## 2019-08-01 RX ADMIN — TIOTROPIUM BROMIDE 1 CAPSULE: 18 CAPSULE ORAL; RESPIRATORY (INHALATION) at 06:27

## 2019-08-01 RX ADMIN — LOSARTAN POTASSIUM 25 MG: 25 TABLET, FILM COATED ORAL at 06:13

## 2019-08-01 RX ADMIN — ESCITALOPRAM OXALATE 10 MG: 10 TABLET ORAL at 06:14

## 2019-08-01 RX ADMIN — FUROSEMIDE 40 MG: 10 INJECTION, SOLUTION INTRAMUSCULAR; INTRAVENOUS at 18:24

## 2019-08-01 RX ADMIN — SENNOSIDES,DOCUSATE SODIUM 2 TABLET: 8.6; 5 TABLET, FILM COATED ORAL at 18:24

## 2019-08-01 RX ADMIN — OMEPRAZOLE 40 MG: 20 CAPSULE, DELAYED RELEASE ORAL at 06:27

## 2019-08-01 RX ADMIN — ATORVASTATIN CALCIUM 40 MG: 40 TABLET, FILM COATED ORAL at 18:24

## 2019-08-01 RX ADMIN — FUROSEMIDE 40 MG: 10 INJECTION, SOLUTION INTRAMUSCULAR; INTRAVENOUS at 06:13

## 2019-08-01 ASSESSMENT — LIFESTYLE VARIABLES
EVER HAD A DRINK FIRST THING IN THE MORNING TO STEADY YOUR NERVES TO GET RID OF A HANGOVER: NO
HAVE PEOPLE ANNOYED YOU BY CRITICIZING YOUR DRINKING: NO
ON A TYPICAL DAY WHEN YOU DRINK ALCOHOL HOW MANY DRINKS DO YOU HAVE: 0
TOTAL SCORE: 0
HAVE YOU EVER FELT YOU SHOULD CUT DOWN ON YOUR DRINKING: NO
HOW MANY TIMES IN THE PAST YEAR HAVE YOU HAD 5 OR MORE DRINKS IN A DAY: 0
DOES PATIENT WANT TO STOP DRINKING: NO
TOTAL SCORE: 0
AVERAGE NUMBER OF DAYS PER WEEK YOU HAVE A DRINK CONTAINING ALCOHOL: 0
CONSUMPTION TOTAL: NEGATIVE
ALCOHOL_USE: NO
TOTAL SCORE: 0
EVER FELT BAD OR GUILTY ABOUT YOUR DRINKING: NO
EVER_SMOKED: UNABLE TO EVALUATE AT THIS TIME - NEEDS ASSESSMENT PRIOR TO DISCHARGE

## 2019-08-01 ASSESSMENT — COPD QUESTIONNAIRES
COPD SCREENING SCORE: 2
IN THE PAST 12 MONTHS DO YOU DO LESS THAN YOU USED TO BECAUSE OF YOUR BREATHING PROBLEMS: DISAGREE/UNSURE
DURING THE PAST 4 WEEKS HOW MUCH DID YOU FEEL SHORT OF BREATH: NONE/LITTLE OF THE TIME
HAVE YOU SMOKED AT LEAST 100 CIGARETTES IN YOUR ENTIRE LIFE: NO/DON'T KNOW
DO YOU EVER COUGH UP ANY MUCUS OR PHLEGM?: NO/ONLY WITH OCCASIONAL COLDS OR INFECTIONS

## 2019-08-01 ASSESSMENT — COGNITIVE AND FUNCTIONAL STATUS - GENERAL
DRESSING REGULAR UPPER BODY CLOTHING: TOTAL
DRESSING REGULAR LOWER BODY CLOTHING: A LOT
HELP NEEDED FOR BATHING: TOTAL
MOBILITY SCORE: 10
DAILY ACTIVITIY SCORE: 8
PERSONAL GROOMING: TOTAL
SUGGESTED CMS G CODE MODIFIER MOBILITY: CL
WALKING IN HOSPITAL ROOM: A LOT
TOILETING: TOTAL
STANDING UP FROM CHAIR USING ARMS: TOTAL
TURNING FROM BACK TO SIDE WHILE IN FLAT BAD: A LITTLE
CLIMB 3 TO 5 STEPS WITH RAILING: TOTAL
MOVING TO AND FROM BED TO CHAIR: A LOT
SUGGESTED CMS G CODE MODIFIER DAILY ACTIVITY: CM
EATING MEALS: A LOT
MOVING FROM LYING ON BACK TO SITTING ON SIDE OF FLAT BED: UNABLE

## 2019-08-01 ASSESSMENT — ENCOUNTER SYMPTOMS
BACK PAIN: 0
SHORTNESS OF BREATH: 0
DIZZINESS: 0
HALLUCINATIONS: 0
ABDOMINAL PAIN: 0
SORE THROAT: 0
FOCAL WEAKNESS: 0
DIARRHEA: 0
HEARTBURN: 0
WEAKNESS: 1
PALPITATIONS: 0
FALLS: 1
MYALGIAS: 0
DEPRESSION: 0
BLOOD IN STOOL: 0
COUGH: 0
PND: 1
BRUISES/BLEEDS EASILY: 1
HEADACHES: 0

## 2019-08-01 ASSESSMENT — PATIENT HEALTH QUESTIONNAIRE - PHQ9
SUM OF ALL RESPONSES TO PHQ9 QUESTIONS 1 AND 2: 0
2. FEELING DOWN, DEPRESSED, IRRITABLE, OR HOPELESS: NOT AT ALL
2. FEELING DOWN, DEPRESSED, IRRITABLE, OR HOPELESS: NOT AT ALL
1. LITTLE INTEREST OR PLEASURE IN DOING THINGS: NOT AT ALL
SUM OF ALL RESPONSES TO PHQ9 QUESTIONS 1 AND 2: 0

## 2019-08-01 ASSESSMENT — PAIN SCALES - WONG BAKER: WONGBAKER_NUMERICALRESPONSE: HURTS JUST A LITTLE BIT

## 2019-08-01 NOTE — PROGRESS NOTES
Hospital Medicine Daily Progress Note    Date of Service  8/1/2019    Chief Complaint  89 y.o. male admitted 7/31/2019 with increased falls, weakness    Hospital Course    History of end-stage heart failure on hospice living in an assisted living facility admitted for increased falls found to have fluid overload secondary to acute on chronic heart failure with preserved ejection fraction.      Interval Problem Update  8/1: Has weaned to 1.5 L, good urine output with increased Lasix.  Creatinine slightly improved.  I talked to his son at bedside.  Goal is to DC back to [facility] w hospice and wean O2    Consultants/Specialty  None    Code Status  DNR/DNI    Disposition  Back to [facility]    Review of Systems  Review of Systems   Constitutional: Positive for malaise/fatigue.   HENT: Negative for sore throat.    Respiratory: Negative for cough and shortness of breath.    Cardiovascular: Positive for leg swelling and PND. Negative for palpitations.   Gastrointestinal: Negative for abdominal pain, blood in stool, diarrhea and heartburn.   Genitourinary: Negative for dysuria and frequency.   Musculoskeletal: Positive for falls. Negative for back pain and myalgias.   Neurological: Positive for weakness. Negative for dizziness, focal weakness and headaches.   Endo/Heme/Allergies: Bruises/bleeds easily.   Psychiatric/Behavioral: Negative for depression and hallucinations.   All other systems reviewed and are negative.       Physical Exam  Temp:  [36.2 °C (97.1 °F)-36.4 °C (97.5 °F)] 36.2 °C (97.2 °F)  Pulse:  [61-75] 61  Resp:  [18-20] 20  BP: (161-194)/(63-95) 186/88  SpO2:  [95 %-100 %] 96 %    Physical Exam   Constitutional: He is oriented to person, place, and time. No distress.   Elderly, listless   HENT:   Head: Normocephalic.   Mouth/Throat: No oropharyngeal exudate.   Bruise and superficial abrasion around L eye  Very hard of hearing   Eyes: Pupils are equal, round, and reactive to light. Conjunctivae are normal.   Neck: Normal  range of motion. No tracheal deviation present.   Cardiovascular: Normal rate and regular rhythm.   No murmur heard.  Pulmonary/Chest: Effort normal. No respiratory distress. He has no wheezes. He exhibits no tenderness.   Abdominal: Soft. He exhibits no distension. There is no tenderness. There is no guarding.   Musculoskeletal: Normal range of motion. He exhibits edema. He exhibits no tenderness.   Lymphadenopathy:     He has no cervical adenopathy.   Neurological: He is alert and oriented to person, place, and time. No cranial nerve deficit.   Skin: Skin is warm and dry. No rash noted.   Multiple bruises, left eye and scattered on extremities   Psychiatric: He is withdrawn. Cognition and memory are impaired.   Short answers   Nursing note and vitals reviewed.      Fluids    Intake/Output Summary (Last 24 hours) at 8/1/2019 1536  Last data filed at 8/1/2019 1000  Gross per 24 hour   Intake 20 ml   Output 2350 ml   Net -2330 ml       Laboratory  Recent Labs     07/31/19  0841 08/01/19  0435   WBC 8.2 9.8   RBC 3.92* 3.76*   HEMOGLOBIN 13.0* 12.5*   HEMATOCRIT 39.4* 41.6*   .5* 110.6*   MCH 33.2* 33.2*   MCHC 33.0* 30.0*   RDW 59.2* 64.2*   PLATELETCT 98* 98*   MPV 11.6 11.6     Recent Labs     07/31/19  0841 07/31/19  1944 08/01/19  0612   SODIUM 144 147* 145   POTASSIUM 3.9 4.0 3.6   CHLORIDE 111 112 109   CO2 25 24 25   GLUCOSE 92 104* 88   BUN 34* 35* 35*   CREATININE 2.39* 2.38* 2.34*   CALCIUM 8.2* 8.3* 8.1*                   Imaging  CT-CSPINE WITHOUT PLUS RECONS   Final Result      1.  No evidence of cervical spine fracture.      2.  Multilevel degenerative disc disease and facet degeneration.      3.  Osteopenia.      CT-MAXILLOFACIAL W/O PLUS RECONS   Final Result      1.  Again seen comminuted and impacted nasal fracture which was present on the prior examination.      2.  No evidence of acute fracture.      CT-HEAD W/O   Final Result      1.  No evidence of acute intracranial process.      2.   Cerebral atrophy as well as periventricular chronic small vessel ischemic change.      DX-CHEST-PORTABLE (1 VIEW)   Final Result      1.  Bibasilar atelectasis and interstitial prominence.      2.  Cardiomegaly.           Assessment/Plan  * Acute diastolic heart failure (HCC)  Assessment & Plan  Last echocardiogram was in 2017 which showed preserved LVEF function however patient did have pulmonary hypertension  Increase Lasix to IV 40 mg twice daily, continue for 24 more hrs  Strict I/Os.      Thrombocytopenia (HCC)  Assessment & Plan  Platelets 98,000  Continue to monitor    Elevated troponin  Assessment & Plan  Elevated troponins at 62  Most likely due to demand ischemia  Diurese    Fall  Assessment & Plan  Most likely due to deconditioning  He has a hospital bed arranged for him at the assisted living facility and soon will have a nighttime caregiver to prevent falls out of bed at night    Pulmonary hypertension (HCC)- (present on admission)  Assessment & Plan  Prior echocardiogram finding, resume Lasix    End of life care- (present on admission)  Assessment & Plan  I discussed the case with Dr. Nunez his son  He would like him comfortable enough to be off of oxygen  patient to return back to nursing home on hospice once stable    Acute renal failure superimposed on stage 3 chronic kidney disease (HCC)- (present on admission)  Assessment & Plan  Multifactorial, including low flow state and overload  Slight improvement with diuresis  IV Lasix higher dose initiated, continue to monitor renal functions closely  Avoid nephrotoxins such as NSAIDs    GERD (gastroesophageal reflux disease)- (present on admission)  Assessment & Plan  Continue with omeprazole    Dementia- (present on admission)  Assessment & Plan  Baseline  Resume memantine    Essential hypertension- (present on admission)  Assessment & Plan  Blood pressure has been running in the 160s 170s.  Plan is to DC unnecessary medications when he returns back  to assisted living with hospice   continue with losartan for now    Persistent atrial fibrillation (HCC)- (present on admission)  Assessment & Plan  Chronic rate controlled  Not on Anticoagulation  Resume aspirin.          VTE prophylaxis: Heparin

## 2019-08-01 NOTE — PROGRESS NOTES
Monitor Summary     Rhythm:V Paced 62-88  Measurements: -/0.14/-  ECTOPIES: R PVC        Normal Values  Rhythm SR  HR Range    Measurements 0.12-0.20 / 0.06-0.10  / 0.30-0.52

## 2019-08-01 NOTE — DISCHARGE PLANNING
LSW placed call to son Fabian. Pt was living at [facility] and was on service with [facility]. Fabian would like pt to d/c back to [facility] and provided consent for LSW to send referral back to [facility]. Pt plan to d/c tomorrow.

## 2019-08-01 NOTE — CARE PLAN
Problem: Safety  Goal: Will remain free from injury  Outcome: PROGRESSING AS EXPECTED  Intervention: Provide assistance with mobility  Flowsheets (Taken 8/1/2019 1112)  Assistance: Assistance of One  Ambulation Tolerance: Tires Quickly; General Weakness  Goal: Will remain free from falls  Outcome: PROGRESSING AS EXPECTED

## 2019-08-01 NOTE — PROGRESS NOTES
RE: PATIENT'S SON KING([phone])  KING @ BEDSIDE; PLAN OF CARE REVIEWED WITH KING; KING REQUESTS THAT NURSING STAFF CALL WITH ANY CONCERNS(OKAY TO LEAVE A DETAILED VOICE MESSAGE)

## 2019-08-01 NOTE — PROGRESS NOTES
Pt is A&Ox1, only to self, resting comfortably in bed. Assessment completed. Due medication have been given. Bed is in lowest postion, bed alarm is on, and side rails up x2, pt calls when needs assistance and call light within reach.

## 2019-08-01 NOTE — THERAPY
"Speech Language Therapy dysphagia treatment completed.     Functional Status: Pt was seen at bedside for f/u dysphagia tx and therapeutic feeding session with lunch tray. Pt currently on D1/NTL diet + 1:1 feeding/supervision. Per RN, Pt requires multiple directives to swallow each bite/sip; however, demonstrated no coughing/choking with recent meals. Upon arrival to Pt's room, Pt's caregiver, Ariane, was present at bedside. Ariane reported that she helps feed the Pt at night, and he eats a Regular/thin diet. He typically does not cough/choke when he eats; however, he demonstrates bolus holding and delayed swallow initiation consistently. He often takes >1-hour to eat a meal. Pt demonstrated improved alertness initially; however, began to fall asleep towards end of session.     Pt was administered PO trials of thins (cup sips) and demonstrated overt coughing after the swallow for +2/3 (66%) trials, which is concerning for possible aspiration/penetration. Pt required max cues to keep eyes open and swallow bolus. Pt demonstrated improved tolerance with NTL; however, had facial grimace after each sip. No coughing/choking was noted. Pt was then provided with one bite of mashed potatoes, and demonstrated bolus holding for >1-min, despite max verbal and tactile cues from SLP and/or Ariane. When asked if he was too tired to eat lunch, he reported \"yes.\" PO trials were discontinued due to fluctuating levels of alertness. At this time, recommend con't 1:1 supervision/feeding, strict adherence to safe swallow precautions and con't diet of D1/NTL with meds crushed in puree. There is concern for inadequate PO intake and poor caloric intake; however, will defer to MD and Pt/family re: appropriateness of PO diet + supplements vs. consideration of artificial nutrition/hydration via feeding tube (as Pt is currently on hospice at baseline.) RN aware.     Recommendations: At this time, recommend con't 1:1 supervision/feeding, strict " "adherence to safe swallow precautions and con't diet of D1/NTL with meds crushed in puree.     Plan of Care: Will benefit from Speech Therapy 3 times per week  Post-Acute Therapy: Recommend inpatient transitional care services for continued speech therapy services - or -outpatient or home health transitional care services for continued speech therapy services (per Pt/family preference)    See \"Rehab Therapy-Acute\" Patient Summary Report for complete documentation.     "

## 2019-08-01 NOTE — PROGRESS NOTES
Received report from Anuel PEREZ. Discussed plan of care and safety measures in place. No further needs at this time.

## 2019-08-01 NOTE — PROGRESS NOTES
Telemetry Shift Summary    Rhythm V paced w/ BBB  HR Range 60s-70s  Ectopy rare PVCs, rare triplets  Measurements ---/0.14/0.50        Normal Values  Rhythm SR  HR Range    Measurements 0.12-0.20 / 0.06-0.10  / 0.30-0.52

## 2019-08-02 VITALS
DIASTOLIC BLOOD PRESSURE: 85 MMHG | RESPIRATION RATE: 20 BRPM | SYSTOLIC BLOOD PRESSURE: 165 MMHG | HEART RATE: 66 BPM | OXYGEN SATURATION: 92 % | TEMPERATURE: 97.1 F | BODY MASS INDEX: 23.58 KG/M2 | WEIGHT: 150.57 LBS

## 2019-08-02 LAB
ALBUMIN SERPL BCP-MCNC: 2.5 G/DL (ref 3.2–4.9)
BASOPHILS # BLD AUTO: 0.9 % (ref 0–1.8)
BASOPHILS # BLD: 0.08 K/UL (ref 0–0.12)
BUN SERPL-MCNC: 33 MG/DL (ref 8–22)
CALCIUM SERPL-MCNC: 8.2 MG/DL (ref 8.4–10.2)
CHLORIDE SERPL-SCNC: 104 MMOL/L (ref 96–112)
CO2 SERPL-SCNC: 32 MMOL/L (ref 20–33)
CREAT SERPL-MCNC: 2.23 MG/DL (ref 0.5–1.4)
EOSINOPHIL # BLD AUTO: 0.42 K/UL (ref 0–0.51)
EOSINOPHIL NFR BLD: 4.9 % (ref 0–6.9)
ERYTHROCYTE [DISTWIDTH] IN BLOOD BY AUTOMATED COUNT: 57.5 FL (ref 35.9–50)
GLUCOSE SERPL-MCNC: 103 MG/DL (ref 65–99)
HCT VFR BLD AUTO: 36.5 % (ref 42–52)
HGB BLD-MCNC: 12.1 G/DL (ref 14–18)
IMM GRANULOCYTES # BLD AUTO: 0.04 K/UL (ref 0–0.11)
IMM GRANULOCYTES NFR BLD AUTO: 0.5 % (ref 0–0.9)
LYMPHOCYTES # BLD AUTO: 1.21 K/UL (ref 1–4.8)
LYMPHOCYTES NFR BLD: 14 % (ref 22–41)
MAGNESIUM SERPL-MCNC: 1.5 MG/DL (ref 1.5–2.5)
MCH RBC QN AUTO: 33.1 PG (ref 27–33)
MCHC RBC AUTO-ENTMCNC: 33.2 G/DL (ref 33.7–35.3)
MCV RBC AUTO: 99.7 FL (ref 81.4–97.8)
MONOCYTES # BLD AUTO: 0.64 K/UL (ref 0–0.85)
MONOCYTES NFR BLD AUTO: 7.4 % (ref 0–13.4)
NEUTROPHILS # BLD AUTO: 6.26 K/UL (ref 1.82–7.42)
NEUTROPHILS NFR BLD: 72.3 % (ref 44–72)
NRBC # BLD AUTO: 0 K/UL
NRBC BLD-RTO: 0 /100 WBC
PHOSPHATE SERPL-MCNC: 3.8 MG/DL (ref 2.5–4.5)
PLATELET # BLD AUTO: 103 K/UL (ref 164–446)
PMV BLD AUTO: 11.5 FL (ref 9–12.9)
POTASSIUM SERPL-SCNC: 2.9 MMOL/L (ref 3.6–5.5)
RBC # BLD AUTO: 3.66 M/UL (ref 4.7–6.1)
SODIUM SERPL-SCNC: 146 MMOL/L (ref 135–145)
WBC # BLD AUTO: 8.7 K/UL (ref 4.8–10.8)

## 2019-08-02 PROCEDURE — A9270 NON-COVERED ITEM OR SERVICE: HCPCS | Performed by: HOSPITALIST

## 2019-08-02 PROCEDURE — 85025 COMPLETE CBC W/AUTO DIFF WBC: CPT

## 2019-08-02 PROCEDURE — 80069 RENAL FUNCTION PANEL: CPT

## 2019-08-02 PROCEDURE — A9270 NON-COVERED ITEM OR SERVICE: HCPCS | Performed by: INTERNAL MEDICINE

## 2019-08-02 PROCEDURE — 99239 HOSP IP/OBS DSCHRG MGMT >30: CPT | Mod: GV | Performed by: INTERNAL MEDICINE

## 2019-08-02 PROCEDURE — 700102 HCHG RX REV CODE 250 W/ 637 OVERRIDE(OP): Performed by: HOSPITALIST

## 2019-08-02 PROCEDURE — 700111 HCHG RX REV CODE 636 W/ 250 OVERRIDE (IP): Performed by: HOSPITALIST

## 2019-08-02 PROCEDURE — 83735 ASSAY OF MAGNESIUM: CPT

## 2019-08-02 PROCEDURE — 700111 HCHG RX REV CODE 636 W/ 250 OVERRIDE (IP): Performed by: INTERNAL MEDICINE

## 2019-08-02 PROCEDURE — 700102 HCHG RX REV CODE 250 W/ 637 OVERRIDE(OP): Performed by: INTERNAL MEDICINE

## 2019-08-02 RX ORDER — POTASSIUM CHLORIDE 20 MEQ/1
20 TABLET, EXTENDED RELEASE ORAL DAILY
Qty: 30 TAB | Refills: 1 | Status: SHIPPED | OUTPATIENT
Start: 2019-08-02

## 2019-08-02 RX ORDER — POTASSIUM CHLORIDE 20 MEQ/1
40 TABLET, EXTENDED RELEASE ORAL ONCE
Status: COMPLETED | OUTPATIENT
Start: 2019-08-02 | End: 2019-08-02

## 2019-08-02 RX ORDER — POTASSIUM CHLORIDE 20 MEQ/1
40 TABLET, EXTENDED RELEASE ORAL 2 TIMES DAILY
Status: DISCONTINUED | OUTPATIENT
Start: 2019-08-02 | End: 2019-08-02

## 2019-08-02 RX ORDER — POTASSIUM CHLORIDE 20 MEQ/1
40 TABLET, EXTENDED RELEASE ORAL ONCE
Status: DISCONTINUED | OUTPATIENT
Start: 2019-08-02 | End: 2019-08-02 | Stop reason: HOSPADM

## 2019-08-02 RX ADMIN — POTASSIUM CHLORIDE 40 MEQ: 1500 TABLET, EXTENDED RELEASE ORAL at 13:22

## 2019-08-02 RX ADMIN — ENOXAPARIN SODIUM 30 MG: 100 INJECTION SUBCUTANEOUS at 06:11

## 2019-08-02 RX ADMIN — OMEPRAZOLE 40 MG: 20 CAPSULE, DELAYED RELEASE ORAL at 06:28

## 2019-08-02 RX ADMIN — LOSARTAN POTASSIUM 25 MG: 25 TABLET, FILM COATED ORAL at 06:11

## 2019-08-02 RX ADMIN — ASPIRIN 81 MG: 81 TABLET ORAL at 06:11

## 2019-08-02 RX ADMIN — ESCITALOPRAM OXALATE 10 MG: 10 TABLET ORAL at 06:11

## 2019-08-02 RX ADMIN — FUROSEMIDE 40 MG: 10 INJECTION, SOLUTION INTRAMUSCULAR; INTRAVENOUS at 06:11

## 2019-08-02 ASSESSMENT — PAIN SCALES - PAIN ASSESSMENT IN ADVANCED DEMENTIA (PAINAD)
BODYLANGUAGE: RELAXED
FACIALEXPRESSION: SMILING OR INEXPRESSIVE
CONSOLABILITY: NO NEED TO CONSOLE
TOTALSCORE: 0
BREATHING: NORMAL

## 2019-08-02 NOTE — CARE PLAN
Pt instructed to use call light, bed in low locked position, call light within reach, provided frequent checks.

## 2019-08-02 NOTE — DISCHARGE INSTRUCTIONS
Nursing Comm:  D/C to: [facility]  Condition: guarded  Diet: As tolerated for comfort    Activity: Wheelchair bound, OOB with assistance and supervision only to avoid risk of falling    Instructions: Please instruct patient to return to the ED if worsening SOB and desire for IV lasix.  Fever/evidence of infection and desire for IV abx.    Follow up:  For now, I wrote to continue cozaar, asa and statin but these meds will likely be discontinued by hospice.  Continue lasix for comfort if desired.

## 2019-08-02 NOTE — DISCHARGE PLANNING
Received Transport Form @ 7339  Spoke to Camille @ [facility]    Transport is scheduled for 8/2 @1500 going to [address] Venkatesh ABURTO.

## 2019-08-02 NOTE — PLAN OF CARE (IOPOC)
2nd call paged out to dr Vera re pt remains obtunded, unable to admin PO K at this time.     [facility] [facility] here for pickup.

## 2019-08-02 NOTE — PROGRESS NOTES
Telemetry Shift Summary     Rhythm V-Paced/A-Fib  HR Range 60-62  Ectopy PVC's  Measurements  - /0.14/0.5           Normal Values  Rhythm SR  HR Range    Measurements 0.12-0.20 / 0.06-0.10  / 0.30-0.52

## 2019-08-02 NOTE — THERAPY
SPEECH THERAPY CONTACT NOTE:    Attempted to see Pt at bedside for f/u dysphagia tx session; however, Pt was found asleep in bed and was unable to wake up fully to safely participate with PO trials. SLP will attempt to follow-up at a later time as able/appropriate. RN aware. Thank you.

## 2019-08-02 NOTE — WOUND TEAM
Asked to see patient regarding laceration to L eye area. No drainage noted. Significant ecchymosis. Also assessed L knee with 3 < 1.0 cm minor abrasions. No dressing recommendations. Nursing to continue assess for any deterioration of skin and wounds. Will be discharged back to hospice care at a SNF. No further wound team visits.

## 2019-08-02 NOTE — PLAN OF CARE (IOPOC)
"Called to [facility] to give reports, she states she is \"turning pt around\" as she can not take dysphagia or if pt is not ambulating, stated she made this very clear to SW. Updated MARYBETH Valadez, paging Dr. Vera.   "

## 2019-08-02 NOTE — DISCHARGE PLANNING
KLARISSA received call from Diane with [facility] requesting update on patient current location.  KLARISSA updated Diane patient will be returning to [facility] at 5:15 pm. Diane requested update call if anything changes - [phone].

## 2019-08-02 NOTE — DISCHARGE SUMMARY
Discharge Summary    CHIEF COMPLAINT ON ADMISSION  Chief Complaint   Patient presents with   • GLF   • Facial Laceration       Reason for Admission  EMS     Admission Date  7/31/2019    CODE STATUS  DNAR/DNI    HPI & HOSPITAL COURSE  This is a 89 y.o. male with a History of end-stage heart failure, on hospice, living in an assisted living facility admitted for increased falls over the last several weeks.  He was found to have fluid overload secondary to acute on chronic heart failure with preserved ejection fraction.  This was refractory to low dose Lasix which she had been taking at home.  It was thought this was likely contributing to his increased weakness therefore he was admitted for IV diuresis in order to make him more comfortable and potentially prevent falls from deconditioning and weakness.     He was requiring about 2 L of oxygen.  He was started on IV diuretics and had good urinary response.  He had a slight elevation in his creatinine from his baseline at about 2.4, which improved to 2.2 on the day of discharge.  He had hypokalemia which was replaced.  He will be discharged to continue his oral diuretic along with potassium supplementation as long as he and his family desire, for his comfort primarily.    He was weaned to room air while at rest however with sleep he did desaturate down to the mid 80s therefore it was recommended that he wear oxygen at night.      Therefore, he is discharged in fair and stable condition to hospice.    The patient met 2-midnight criteria for an inpatient stay at the time of discharge.    Discharge Date  8/2/2019    FOLLOW UP ITEMS POST DISCHARGE  Continue ongoing outpatient care with hospice    DISCHARGE DIAGNOSES  Principal Problem:    Acute diastolic heart failure (HCC) POA: Unknown  Active Problems:    Persistent atrial fibrillation (HCC) POA: Yes    Essential hypertension POA: Yes    Dementia POA: Yes    GERD (gastroesophageal reflux disease) POA: Yes    History of  prostate cancer POA: Yes    Acute renal failure superimposed on stage 3 chronic kidney disease (HCC) POA: Yes    End of life care POA: Yes    Pulmonary hypertension (HCC) POA: Yes    Fall POA: Unknown    Elevated troponin POA: Unknown    Thrombocytopenia (HCC) POA: Unknown  Resolved Problems:    * No resolved hospital problems. *      FOLLOW UP  Future Appointments   Date Time Provider Department Center   8/7/2019 10:45 AM Daniele Rebolledo M.D. [facility] None     Thelma Cervantes M.D.  [address] [address]  [place] [address]  [phone]      [facility]  LEFT A MESSAGE WITH YOUR PROVIDER REGARDING NEED FOR FOLLOW UP APPOINTMENT. PLEASE CALL THEIR OFFICE DIRECTLY IF YOU DO NOT HEAR FROM THEM WITH IN 2 DAYS. THANK YOU      MEDICATIONS ON DISCHARGE     Medication List      START taking these medications      Instructions   potassium chloride SA 20 MEQ Tbcr  Commonly known as:  Kdur   Take 1 Tab by mouth every day.  Dose:  20 mEq        CONTINUE taking these medications      Instructions   aspirin EC 81 MG Tbec  Commonly known as:  ECOTRIN   Take 81 mg by mouth every day.  Dose:  81 mg     atorvastatin 40 MG Tabs  Commonly known as:  LIPITOR   Take 1 Tab by mouth every evening.  Dose:  40 mg     escitalopram 10 MG Tabs  Commonly known as:  LEXAPRO   Take 10 mg by mouth every day.  Dose:  10 mg     furosemide 20 MG Tabs  Commonly known as:  LASIX   Take 20 mg by mouth every day. 3 DAY COURSE  Dose:  20 mg     ibuprofen 200 MG Tabs  Commonly known as:  MOTRIN   Take 400 mg by mouth 2 Times a Day.  Dose:  400 mg     LORazepam 0.5 MG Tabs  Commonly known as:  ATIVAN   Take 0.5 mg by mouth every four hours as needed for Anxiety.  Dose:  0.5 mg     losartan 25 MG Tabs  Commonly known as:  COZAAR   Take 25 mg by mouth every morning.  Dose:  25 mg     memantine 5 MG Tabs  Commonly known as:  NAMENDA   Take 5 mg by mouth every evening.  Dose:  5 mg     morphine 100 MG/5ML Soln   Take 5 mg by mouth every 1 hour as needed  (PAIN).  Dose:  5 mg     omeprazole 20 MG delayed-release capsule  Commonly known as:  PRILOSEC   Take 20 mg by mouth every day.  Dose:  20 mg     REMEDY CALAZIME EX   1 Application by Apply externally route 2 Times a Day.  Dose:  1 Application     tiotropium 18 MCG Caps  Commonly known as:  SPIRIVA   Inhale 18 mcg by mouth every day.  Dose:  18 mcg        STOP taking these medications    ascorbic acid 500 MG Tabs  Commonly known as:  ascorbic acid     VITAMIN D PO            Allergies  Allergies   Allergen Reactions   • Beta Adrenergic Blockers Anaphylaxis     HR goes into teens, Cardiology wants him to never have another beta blocker   • Penicillins      Unknown reaction         DIET  Regular Diet    ACTIVITY  Wheelchair with assistance only to prevent falls    CONSULTATIONS  None    PROCEDURES  None    LABORATORY  Lab Results   Component Value Date    SODIUM 146 (H) 08/02/2019    POTASSIUM 2.9 (L) 08/02/2019    CHLORIDE 104 08/02/2019    CO2 32 08/02/2019    GLUCOSE 103 (H) 08/02/2019    BUN 33 (H) 08/02/2019    CREATININE 2.23 (H) 08/02/2019        Lab Results   Component Value Date    WBC 8.7 08/02/2019    HEMOGLOBIN 12.1 (L) 08/02/2019    HEMATOCRIT 36.5 (L) 08/02/2019    PLATELETCT 103 (L) 08/02/2019        Total time of the discharge process exceeds 40 minutes.

## 2019-08-02 NOTE — DISCHARGE PLANNING
"    LSW received call pt is coming back to hospital due to his dysphasia diet and lack of mobility. LSW received message from MD that pts diet has been updated in DC summary to say \"regular diet.\" LSW and hospitalist ANA Jimenez spoke with Trixie at [facility] who would like updated d/c summary. LSW faxed new d/c summary.     LSW spoke with [facility] who needs approved services as pts insurance will not pay for 2nd ride. LSW spoke with supervisor Rehana who spoke with director Michelle Lopez to approve a $911 trip back to [facility]. LSW faxed approved services to [facility].     Per Camille with [facility], they can come get pt again around 5:15pm     LSW updated bedside RN.   "

## 2019-08-02 NOTE — PROGRESS NOTES
Telemetry Shift Summary    Rhythm V paced 100%  HR Range 60s  Ectopy Occasional PVC and rare trigeminy   Measurements -/-/-        Normal Values  Rhythm SR  HR Range    Measurements 0.12-0.20 / 0.06-0.10  / 0.30-0.52

## 2019-08-02 NOTE — DISCHARGE PLANNING
Received Choice form at 0330  Agency/Facility Name: [facility]  Referral sent per Choice form @ [phone]

## 2019-08-02 NOTE — CARE PLAN
Pt instructed to use call light, bed in low locked position, call light within reach, provided frequent checks.    Answered Pt questions to the best of my ability, discussed POC.

## 2019-08-02 NOTE — PLAN OF CARE (IOPOC)
Pt lethargic/obtunded, opens eyes briefly with touch stimuli. Pt requires 02 when not aroused, see flowsheet. Meal held until able to maintain arousal.

## 2019-08-02 NOTE — DISCHARGE PLANNING
FESTUS spoke with admissions at [facility] along with MD. FESTUS and MD notified LEWIS that pt is back to baseline and catheter will be removed. Pt is set to d/c via [facility] at 3:00 today.            Care Transition Team Assessment    Information Source  Orientation : Disoriented to Event, Disoriented to Place, Disoriented to Time  Information Given By: Patient, Relative  Informant's Name: SON(KING)  Who is responsible for making decisions for patient? : Adult child  Name(s) of Primary Decision Maker: (Fabian Nunez)    Readmission Evaluation  Is this a readmission?: No    Elopement Risk  Legal Hold: No  Ambulatory or Self Mobile in Wheelchair: No-Not an Elopement Risk  Elopement Risk: Not at Risk for Elopement    Interdisciplinary Discharge Planning  Patient or legal guardian wants to designate a caregiver (see row info): No    Discharge Preparedness  What is your plan after discharge?: Home with help, Other (comment)(hsopice)  Prior Functional Level: Needs Assist with Activities of Daily Living, Needs Assist with Medication Management, Uses Wheelchair    Functional Assesment  Prior Functional Level: Needs Assist with Activities of Daily Living, Needs Assist with Medication Management, Uses Wheelchair    Finances  Financial Barriers to Discharge: No  Prescription Coverage: Yes    Vision / Hearing Impairment  Vision Impairment : Yes  Right Eye Vision: Impaired  Left Eye Vision: Impaired  Hearing Impairment : Yes  Hearing Impairment: Both Ears, Hearing Device Not Available  Does Pt Need Special Equipment for the Hearing Impaired?: No         Advance Directive  Advance Directive?: Living Will, POLST    Domestic Abuse  Have you ever been the victim of abuse or violence?: No  Physical Abuse or Sexual Abuse: No  Verbal Abuse or Emotional Abuse: No    Psychological Assessment  History of Substance Abuse: None  History of Psychiatric Problems: No  Non-compliant with Treatment: No  Newly Diagnosed Illness: No    Discharge Risks  or Barriers  Discharge risks or barriers?: No  Patient risk factors: Complex medical needs, Vulnerable adult    Anticipated Discharge Information  Anticipated discharge disposition: Assisted living, Hospice  Discharge Address: ([facility] [address] [facility] [place] [address])  Discharge Contact Phone Number: ([phone])

## 2019-08-03 LAB
BACTERIA UR CULT: NORMAL
SIGNIFICANT IND 70042: NORMAL
SITE SITE: NORMAL
SOURCE SOURCE: NORMAL

## 2021-01-14 DIAGNOSIS — Z23 NEED FOR VACCINATION: ICD-10-CM

## 2022-01-24 NOTE — PROGRESS NOTES
Report received. Pt sitting up in bed, no signs of distress. RT in with pt, oxygen currently at 2.5 L. Pt is oriented only to self. Assessment completed and medications administered. Pt reports some pain to his left side (ribcage), Tylenol provided. Pt currently sitting up in bed watching TV, needs met at this time.   Prophylactic measure

## 2025-03-20 NOTE — PROGRESS NOTES
Pulmonary Critical Care Progress Note        Chief Complaint: Shortness of breath and hypoxemia.    History of Present Illness: 89 y.o. male discharged after treatment for hypoxemic respiratory insufficiency and readmitted several hours later from the skilled nursing facility with increasing shortness of breath and hypoxemia requiring higher flows are supplemental oxygen.    ROS:  Respiratory: positive shortness of breath, improved, Cardiac: positive chest pain, GI: negative nausea.  All other systems negative.    Interval Events:  24 hour interval history reviewed.  He is comfortable at rest without overt dyspnea low-flow oxygen nasal cannula, currently down to 3L/m. He does have some pleuritic migratory chest pain that is better controlled today and troponin I levels have been normal. He is hemodynamically stable and is in a regular rhythm, consistent with his pacemaker function. He has a mild stable anemia with a hemoglobin of 13.3 g percent. Azotemia persists with a serum creatinine of 1.95 mg percent. The BNP level is a bit elevated at 240.    PFSH:  No change.    Respiratory:  Pulse Oximetry: 95 %  Exam: diminished but symmetrical bilateral breath sounds with basilar rales but no wheezing or egophony.  ImagingAvailable data reviewed.  The chest done on September 11 demonstrated cardiomegaly and stable density at the left base with a possible small left pleural effusion. No film today.    HemoDynamics:  Pulse: 61, Heart Rate (Monitored): 60  Blood Pressure : 153/87     Exam: regular rate and rhythm  Imaging: Available data reviewed.  The echocardiogram on September 5, 2017 demonstrated preserved left ventricular systolic function with an estimated ejection fraction of 70%. There was mild mitral regurgitation with a severely dilated left atrium and a moderately dilated right ventricle with an estimated right ventricular systolic pressure of 40-55 mmHg.  Recent Labs      09/11/17   0059  09/11/17   0710  heparin stopped at 1645 per surgeon.      TROPONINI  0.02  0.03   BNPBTYPENAT  211*  240*       Neuro:  GCS    Exam: no focal deficits noted, alert but intermittently confused.  Imaging: None - Reviewed    Fluids:  Intake/Output       09/10/17 07 - 17 0659 (Not Admitted) 17 07 - 17 0659 17 07 - 17 0659      [id number] 3918-7096 Total 0101-6198 2680-7574 Total 8629-4134 9104-7681 Total       Intake    P.O.  --  -- --  360  50 410  300  -- 300    P.O. -- -- -- 360 50 410 300 -- 300    Total Intake -- -- -- 360 50 410 300 -- 300       Output    Urine  --  -- --  700  750 1450  300  -- 300    Indwelling Cathether -- -- --  300 -- 300    Stool  --  -- --  --  -- --  --  -- --    Number of Times Stooled -- 1 x 1 x -- -- -- -- -- --    Total Output -- -- --  300 -- 300       Net I/O     -- -- -- -340 -700 -1040 0 -- 0           Recent Labs      09/10/17   0350  09/11/17   0059  09/12/17   0020   SODIUM  133*  136  136   POTASSIUM  3.6  4.0  4.2   CHLORIDE  101  103  102   CO2  28  24  25   BUN  47*  45*  48*   CREATININE  1.83*  1.53*  1.95*   MAGNESIUM   --   1.7   --    PHOSPHORUS   --   2.6   --    CALCIUM  7.9*  7.9*  8.5       GI/Nutrition:  Exam: abdomen is soft and non-tender  Imaging: Available data reviewed  taking PO  Liver Function  Recent Labs      09/10/17   0350  09/11/17   0059  09/12/17   0020   ALTSGPT   --   13  12   ASTSGOT   --     14   ALKPHOSPHAT   --   98  104*   TBILIRUBIN   --   1.1  0.6   GLUCOSE  131*  115*  121*       Heme:  Recent Labs      09/10/17   0350  09/11/17   0059  09/12/17   0020   RBC  4.09*  3.84*  4.13*   HEMOGLOBIN  13.2*  12.5*  13.3*   HEMATOCRIT  38.4*  36.1*  39.1*   PLATELETCT  185  178  192   PROTHROMBTM   --   17.3*   --    APTT   --   44.2*   --    INR   --   1.44*   --        Infectious Disease:  Temp  Av.6 °C (97.9 °F)  Min: 36.2 °C (97.2 °F)  Max: 37.1 °C (98.7 °F)  Micro: no new positive cultures  Recent Labs      09/10/17   0350  17    0059  09/12/17   0020   WBC  10.9*  11.8*  11.2*   NEUTSPOLYS   --   80.70*  72.10*   LYMPHOCYTES   --   10.40*  13.90*   MONOCYTES   --   5.30  7.60   EOSINOPHILS   --   2.60  5.10   BASOPHILS   --   0.30  0.50   ASTSGOT   --   17  14   ALTSGPT   --   13  12   ALKPHOSPHAT   --   98  104*   TBILIRUBIN   --   1.1  0.6     Current Facility-Administered Medications   Medication Dose Frequency Provider Last Rate Last Dose   • dabigatran (PRADAXA) capsule 75 mg  75 mg BID MIKE Alexandre.O.   75 mg at 09/12/17 0849   • escitalopram (LEXAPRO) tablet 10 mg  10 mg DAILY MIKE Alexandre.O.   10 mg at 09/12/17 0849   • memantine (NAMENDA) tablet 5 mg  5 mg DAILY MIKE Alexandre.O.   5 mg at 09/12/17 0849   • omeprazole (PRILOSEC) capsule 20 mg  20 mg DAILY MIKE Alexandre.O.   20 mg at 09/12/17 0849   • oxycodone immediate-release (ROXICODONE) tablet 5 mg  5 mg Q8HRS PRN AFIA AlexandreOMercy   5 mg at 09/11/17 0413   • tamsulosin (FLOMAX) capsule 0.4 mg  0.4 mg AFTER BREAKFAST AFIA AlexandreOMercy   0.4 mg at 09/12/17 0849   • senna-docusate (PERICOLACE or SENOKOT S) 8.6-50 MG per tablet 2 Tab  2 Tab BID AFIA AlexandreO.   2 Tab at 09/12/17 0849    And   • polyethylene glycol/lytes (MIRALAX) PACKET 1 Packet  1 Packet QDAY PRN Yanelis Champagne D.O.        And   • magnesium hydroxide (MILK OF MAGNESIA) suspension 30 mL  30 mL QDAY PRN Yanelis Champagne D.O.        And   • bisacodyl (DULCOLAX) suppository 10 mg  10 mg QDAY PRN Yanelis Champagne D.O.       • Respiratory Care per Protocol   Continuous RT Yanelis Champagne, D.O.       • acetaminophen (TYLENOL) tablet 650 mg  650 mg Q6HRS PRN Yanelis Champagne D.O.   650 mg at 09/11/17 2017   • ondansetron (ZOFRAN) syringe/vial injection 4 mg  4 mg Q4HRS PRN MIKE Alexandre.O.       • ondansetron (ZOFRAN ODT) dispertab 4 mg  4 mg Q4HRS PRN Yanelis Champagne D.O.       • haloperidol lactate (HALDOL) injection 5 mg  5 mg Q4HRS PRN MIKE Alexandre.O.       • guaifenesin dextromethorphan (ROBITUSSIN DM) 100-10  MG/5ML syrup 10 mL  10 mL Q6HRS PRN Yanelis Champagne D.O.         Last reviewed on 9/11/2017  3:28 AM by Leslie Jackson R.N.    Quality  Measures:  Core Measures & Quality Metrics    Problems:  1. Acute on chronic hypoxemic respiratory insufficiency. His worsening gas exchange at the time of readmission may have been related to atelectasis and perhaps mild pulmonary edema but is improved now with oxygen requirements back to baseline levels.    2. Abnormal chest radiogram. The findings suggest stable hypoventilatory atelectasis and perhaps pulmonary edema. There is no fever significant leukocytosis or other symptoms to suggest pneumonia.    3. Possible heart failure with preserved systolic function. The echocardiogram demonstrates reserved left ventricular systolic function but does show a significantly dilated left atrium although the mitral regurgitation was assessed as mild. There is significant pulmonary hypertension.    4. Chronic kidney disease, stage III.    5. Systemic arterial hypertension, controlled    6. History of paroxysmal atrial fibrillation now in a stable paced rhythm with permanent transvenous pacemaker.    7. Mild stable anemia without evidence of ongoing blood loss.    8. Dementia, without new focal neurologic findings.    Plan:  Continue titrate oxygen, respiratory protocols, incentive spirometry as tolerated and mobilization. Discussed with respiratory therapy and we will continue positive pressure therapy designed to reduce hypoventilatory atelectasis.    Gentle diuresis, monitoring renal function with ongoing electrolyte management. Avoid nephrotoxins.    Continue blood pressure control and prophylaxis.         No